# Patient Record
Sex: FEMALE | Race: WHITE | NOT HISPANIC OR LATINO | Employment: OTHER | URBAN - METROPOLITAN AREA
[De-identification: names, ages, dates, MRNs, and addresses within clinical notes are randomized per-mention and may not be internally consistent; named-entity substitution may affect disease eponyms.]

---

## 2019-07-23 ENCOUNTER — HOSPITAL ENCOUNTER (OUTPATIENT)
Dept: RADIOLOGY | Facility: HOSPITAL | Age: 69
Discharge: HOME | End: 2019-07-23
Attending: INTERNAL MEDICINE
Payer: COMMERCIAL

## 2019-07-23 DIAGNOSIS — R05.9 COUGH: ICD-10-CM

## 2019-07-23 DIAGNOSIS — R05.9 COUGH: Primary | ICD-10-CM

## 2019-07-23 PROCEDURE — 71046 XR CHEST PA AND LATERAL: ICD-10-PCS | Mod: 26,,, | Performed by: RADIOLOGY

## 2019-07-23 PROCEDURE — 71046 X-RAY EXAM CHEST 2 VIEWS: CPT | Mod: 26,,, | Performed by: RADIOLOGY

## 2019-07-23 PROCEDURE — 71046 X-RAY EXAM CHEST 2 VIEWS: CPT | Mod: TC,FY

## 2020-07-21 DIAGNOSIS — R06.00 DYSPNEA, UNSPECIFIED TYPE: ICD-10-CM

## 2020-07-21 DIAGNOSIS — R41.3 MEMORY DEFICIT: Primary | ICD-10-CM

## 2020-07-23 ENCOUNTER — OFFICE VISIT (OUTPATIENT)
Dept: UROLOGY | Facility: CLINIC | Age: 70
End: 2020-07-23
Payer: MEDICARE

## 2020-07-23 VITALS
DIASTOLIC BLOOD PRESSURE: 70 MMHG | HEIGHT: 66 IN | BODY MASS INDEX: 22.5 KG/M2 | WEIGHT: 140 LBS | SYSTOLIC BLOOD PRESSURE: 122 MMHG

## 2020-07-23 DIAGNOSIS — R35.0 URINARY FREQUENCY: ICD-10-CM

## 2020-07-23 DIAGNOSIS — N39.46 URINARY INCONTINENCE, MIXED: Primary | ICD-10-CM

## 2020-07-23 DIAGNOSIS — R35.1 NOCTURIA: ICD-10-CM

## 2020-07-23 LAB
BILIRUB SERPL-MCNC: ABNORMAL MG/DL
BLOOD URINE, POC: 50
COLOR, POC UA: YELLOW
GLUCOSE UR QL STRIP: ABNORMAL
KETONES UR QL STRIP: ABNORMAL
LEUKOCYTE ESTERASE URINE, POC: ABNORMAL
NITRITE, POC UA: ABNORMAL
PH, POC UA: 5
PROTEIN, POC: ABNORMAL
SPECIFIC GRAVITY, POC UA: 1025
UROBILINOGEN, POC UA: 1

## 2020-07-23 PROCEDURE — 1126F AMNT PAIN NOTED NONE PRSNT: CPT | Mod: S$GLB,,, | Performed by: NURSE PRACTITIONER

## 2020-07-23 PROCEDURE — 1159F PR MEDICATION LIST DOCUMENTED IN MEDICAL RECORD: ICD-10-PCS | Mod: S$GLB,,, | Performed by: NURSE PRACTITIONER

## 2020-07-23 PROCEDURE — 99204 PR OFFICE/OUTPT VISIT, NEW, LEVL IV, 45-59 MIN: ICD-10-PCS | Mod: 25,S$GLB,, | Performed by: NURSE PRACTITIONER

## 2020-07-23 PROCEDURE — 81001 URINALYSIS AUTO W/SCOPE: CPT | Mod: S$GLB,,, | Performed by: NURSE PRACTITIONER

## 2020-07-23 PROCEDURE — 81001 PR  URINALYSIS, AUTO, W/SCOPE: ICD-10-PCS | Mod: S$GLB,,, | Performed by: NURSE PRACTITIONER

## 2020-07-23 PROCEDURE — 99999 PR PBB SHADOW E&M-EST. PATIENT-LVL III: ICD-10-PCS | Mod: PBBFAC,,, | Performed by: NURSE PRACTITIONER

## 2020-07-23 PROCEDURE — 87086 URINE CULTURE/COLONY COUNT: CPT

## 2020-07-23 PROCEDURE — 99204 OFFICE O/P NEW MOD 45 MIN: CPT | Mod: 25,S$GLB,, | Performed by: NURSE PRACTITIONER

## 2020-07-23 PROCEDURE — 1101F PT FALLS ASSESS-DOCD LE1/YR: CPT | Mod: CPTII,S$GLB,, | Performed by: NURSE PRACTITIONER

## 2020-07-23 PROCEDURE — 1126F PR PAIN SEVERITY QUANTIFIED, NO PAIN PRESENT: ICD-10-PCS | Mod: S$GLB,,, | Performed by: NURSE PRACTITIONER

## 2020-07-23 PROCEDURE — 1159F MED LIST DOCD IN RCRD: CPT | Mod: S$GLB,,, | Performed by: NURSE PRACTITIONER

## 2020-07-23 PROCEDURE — 99999 PR PBB SHADOW E&M-EST. PATIENT-LVL III: CPT | Mod: PBBFAC,,, | Performed by: NURSE PRACTITIONER

## 2020-07-23 PROCEDURE — 3008F PR BODY MASS INDEX (BMI) DOCUMENTED: ICD-10-PCS | Mod: CPTII,S$GLB,, | Performed by: NURSE PRACTITIONER

## 2020-07-23 PROCEDURE — 3008F BODY MASS INDEX DOCD: CPT | Mod: CPTII,S$GLB,, | Performed by: NURSE PRACTITIONER

## 2020-07-23 PROCEDURE — 1101F PR PT FALLS ASSESS DOC 0-1 FALLS W/OUT INJ PAST YR: ICD-10-PCS | Mod: CPTII,S$GLB,, | Performed by: NURSE PRACTITIONER

## 2020-07-23 RX ORDER — SOLIFENACIN SUCCINATE 10 MG/1
10 TABLET, FILM COATED ORAL DAILY
Qty: 30 TABLET | Refills: 11 | Status: CANCELLED | OUTPATIENT
Start: 2020-07-23 | End: 2021-07-23

## 2020-07-23 NOTE — PATIENT INSTRUCTIONS
Treating Incontinence in Women: Nonsurgical Methods    The best treatment for you will depend on the type of incontinence you have. Your symptoms, age, and any underlying problems that are found also affect your treatment. While some types of incontinence may eventually require surgery, nonsurgical treatments may be effective in many cases. Nonsurgical treatments include lifestyle changes, muscle-strengthening exercises, and medicines.  Nonsurgical Treatments  Treatment for stress urinary incontinence includes:  · Bladder training  · Lifestyle changes such as weight loss and increased activity if incontinence is due to being overweight  · Medicines, if bladder training has not helped  · Pelvic floor muscle exercises  Lifestyle changes  · Losing weight. Excess weight puts extra pressure on the pelvic floor muscles. Exercising and eating right can help you lose weight. This helps other treatments work better.  · Making certain diet changes. Some foods may make you need to urinate more, so it may be good to avoid them. These include caffeinated drinks and alcohol. Ask your healthcare provider whether these or other diet changes might be helpful.  · Quitting smoking. Smoking can lead to a chronic cough that strains pelvic floor muscles. Smoking may also damage the bladder and urethra.  Pelvic floor musle exercises  There are exercises you can do to help strengthen your pelvic floor muscles. The pelvic floor muscles act as a sling to help hold the bladder and urethra in place. These muscles also help keep the urethra closed. Weak pelvic floor muscles may allow urine to leak. To strengthen the pelvic floor muscles, do the exercises daily. In a few months, the muscles will be stronger and tighter. This can help prevent urine leakage.  Date Last Reviewed: 1/1/2017  © 6562-7829 [employer]. [address] [place] [address], [place] [address]. All rights reserved. This information is not intended as a substitute for  professional medical care. Always follow your healthcare professional's instructions.        Treating Incontinence in Women with Medicine    Urinary incontinence is the leaking of urine from the bladder. In some cases, medicine can reduce or stop the leaking. It is mainly given for urge incontinence. Your healthcare provider will talk with you about your options. Make sure to ask what side effects to expect.  Below are some types of medicines that may help with urge incontinence.  Types of medicine  · Anticholinergics. These may increase how much urine the bladder can hold. They may also help relax bladder muscles.  · Estrogen. This may help improve muscle tone in the urethra and bladder.  · Antibiotics. These are used to treat urinary tract infections.  · Botulinum toxin. Injection of botulinum toxin into the bladder muscle is an option when other medicines are not effective.  Tips for taking medicine  · Take your medicine on time and as your healthcare provider tell you to.  · Tell your healthcare provider if you have any side effects, your dosage may be adjusted if necessary.  · Be patient. It may take time to find the right dose for you.  · Keep a list of the medicines you take. Show it to your healthcare provider and pharmacist before you buy over-the-counter medicines.   Date Last Reviewed: 1/1/2017  © 1613-5465 [employer]. [address] [place] [address], [place] [address]. All rights reserved. This information is not intended as a substitute for professional medical care. Always follow your healthcare professional's instructions.        Kegel Exercises  Kegel exercises dont need special clothing or equipment. Theyre easy to learn and simple to do. And if you do them right, no one can tell youre doing them, so they can be done almost anywhere. Your healthcare provider, nurse, or physical therapist can answer any questions you have and help you get started.    A weak pelvic floor   The pelvic floor  muscles may weaken due to aging, pregnancy and vaginal childbirth, injury, surgery, chronic cough, or lack of exercise. If the pelvic floor is weak, your bladder and other pelvic organs may sag out of place. The urethra may also open too easily and allow urine to leak out. Kegel exercises can help you strengthen your pelvic floor muscles. Then they can better support the pelvic organs and control urine flow.  How Kegel exercises are done  Try each of the Kegel exercises described below. When youre doing them, try not to move your leg, buttock, or stomach muscles.  · Contract as if you were stopping your urine stream. But do it when youre not urinating.  · Tighten your rectum as if trying not to pass gas. Contract your anus, but dont move your buttocks.  · You may place a finger or 2 in the vagina and squeeze your finger with your vagina to learn which muscles to tighten.  Try to hold each Kegel for a slow count to 5. You probably wont be able to hold them for that long at first. But keep practicing. It will get easier as your pelvic floor gets stronger. Eventually, special weights that you place in your vagina may be recommended to help make your Kegels even more effective. Visit your healthcare provider if you have difficulties doing Kegel exercises.  Helpful hints  Here are some tips to follow:  · Do your Kegels as often as you can. The more you do them, the faster youll feel the results.  · Pick an activity you do often as a reminder. For instance, do your Kegels every time you sit down.  · Tighten your pelvic floor before you sneeze, get up from a chair, cough, laugh, or lift. This protects your pelvic floor from injury and can help prevent urine leakage.   Date Last Reviewed: 8/5/2015  © 9542-7862 [employer]. [address] [place] [address], [place] [address]. All rights reserved. This information is not intended as a substitute for professional medical care. Always follow your healthcare  professional's instructions.

## 2020-07-23 NOTE — LETTER
July 23, 2020      David Olson MD  [address]  [address]  [place] [address]           [facility] - Urology  [address]  [place] [address]  Phone: [phone]  Fax: [phone]          Patient: Elsa Steel   MR Number: [id number]   YOB: 1950   Date of Visit: 7/23/2020       Dear Dr. David Olson:    Thank you for referring Elsa Steel to me for evaluation. Attached you will find relevant portions of my assessment and plan of care.    If you have questions, please do not hesitate to call me. I look forward to following Elsa Steel along with you.    Sincerely,    Tiana Vora, SANDRA    Enclosure  CC:  No Recipients    If you would like to receive this communication electronically, please contact [email or web address] or [phone] to request more information on [employer] Link access.    For providers and/or their staff who would like to refer a patient to [facility], please contact us through our one-stop-shop provider referral line, [facility], at [phone].    If you feel you have received this communication in error or would no longer like to receive these types of communications, please e-mail [email or web address]

## 2020-07-23 NOTE — PROGRESS NOTES
Subjective:       Patient ID: Elsa Steel is a 69 y.o. female who is a new patient was referred by David Olson MD for urinary frequency/urinary incontinence    Chief Complaint:   Chief Complaint   Patient presents with    Other     Pt. states she has been having lower abdominal pain.. leakage.. havong no control over it.. she has tried to hold her urine ad she can't.. burning or heavy feeling when she uses the bathroom.. has a history of UTI's and  she was referred by PCP..        Urinary Incontinence  Patient complains of urinary incontinence. This has been present for 1 year. She reports worsening of symptoms within the past month.  She leaks urine with coughing, laughing, sneezing, with urge, with a full bladder, during the night. Patient describes the symptoms as frequent urination (severalx per day), nocturia 1 times per night, the urge to urinate recurs again shortly following micturition, urge to urinate with little or no warning, urine leakage with coughing/heavy physical activity, urine leaking unpredictably and voiding small amounts. Factors associated with symptoms include none known. Evaluation to date includes none. Treatment to date includes oxybutynin 5 mg BID which was initially but later was no longer effective. She is no longer taking this medication    She is currently wearing 2 pads per day. She reports UTIs x 3 in her lifetime. No previous  surgeries. Denies vaginal bulge. She drinks mostly water. Reports intake of coffee and tea occasionally. Denies issues with constipation.  There is no history of kidney stones. Denies dysuria, gross hematuria or flank pain      ACTIVE MEDICAL ISSUES:  There is no problem list on file for this patient.      PAST MEDICAL HISTORY  No past medical history on file.    PAST SURGICAL HISTORY:  No past surgical history on file.    SOCIAL HISTORY:  Social History     Tobacco Use    Smoking status: Not on file   Substance Use Topics    Alcohol use: Not on  "file    Drug use: Not on file       FAMILY HISTORY:  No family history on file.    ALLERGIES AND MEDICATIONS: updated and reviewed.  Review of patient's allergies indicates:   Allergen Reactions    Iodine and iodide containing products      No current outpatient medications on file.     No current facility-administered medications for this visit.        Review of Systems   Constitutional: Negative for activity change, chills, fatigue, fever and unexpected weight change.   Eyes: Negative for discharge, redness and visual disturbance.   Respiratory: Negative for cough, shortness of breath and wheezing.    Cardiovascular: Negative for chest pain and leg swelling.   Gastrointestinal: Negative for abdominal distention, abdominal pain, constipation, diarrhea, nausea and vomiting.   Genitourinary: Positive for frequency and urgency. Negative for decreased urine volume, difficulty urinating, dysuria, flank pain, hematuria, pelvic pain, vaginal bleeding, vaginal discharge and vaginal pain.   Musculoskeletal: Negative for arthralgias, joint swelling and myalgias.   Skin: Negative for color change and rash.   Neurological: Negative for dizziness and light-headedness.   Psychiatric/Behavioral: Negative for behavioral problems and confusion. The patient is not nervous/anxious.        Objective:      Vitals:    07/23/20 1551   BP: 122/70   Weight: 63.5 kg (140 lb)   Height: 5' 6" (1.676 m)     Physical Exam   Constitutional: She is oriented to person, place, and time. She appears well-developed.   HENT:   Head: Normocephalic and atraumatic.   Nose: Nose normal.   Eyes: Conjunctivae are normal. Right eye exhibits no discharge. Left eye exhibits no discharge.   Neck: Normal range of motion. Neck supple. No tracheal deviation present. No thyromegaly present.   Cardiovascular: Normal rate and regular rhythm.    Pulmonary/Chest: Effort normal. No respiratory distress. She has no wheezes.   Abdominal: Soft. She exhibits no " distension. There is no abdominal tenderness. No hernia.   Genitourinary:    Genitourinary Comments: Patient declined exam     Musculoskeletal: Normal range of motion.   Neurological: She is alert and oriented to person, place, and time.   Skin: Skin is warm and dry. No rash noted. No erythema.     Psychiatric: Her behavior is normal. Judgment normal.       Urine dipstick shows ++LE, 50 RBCs .      Assessment:       1. Urinary incontinence, mixed    2. Urinary frequency    3. Nocturia          Plan:       1. Urinary incontinence, mixed   - Discussed difference of UUI and KIMBERLY components. Reviewed etiology and workup of each.   - KIMBERLY: Kegels, PFPT, pessary, bulking agent, MUS.   -Kegel exercises. Handout provided   - UUI: Behavioral changes, PFPT, anticholinergics, mirabegron. Botox/InterStim for refractory UUI.   -Oxybutynin 5 mg BID--no longer helpful  -Discussed trial of another ACh vs Myrbetriq--she is not inclined to taking any additional medications at this time  -Discussed timed voiding/bladder retraining. Handout provided  - POCT urinalysis, dipstick or tablet reag    2. Urinary frequency  -Unable to perform PVR d/t bladder scan being unavailable  -PVR next visit  - Urine culture    3. Nocturia    - Urine culture            Follow up in about 3 months (around 10/23/2020) for Follow up.

## 2020-07-24 ENCOUNTER — HOSPITAL ENCOUNTER (OUTPATIENT)
Dept: RADIOLOGY | Facility: HOSPITAL | Age: 70
Discharge: HOME | End: 2020-07-24
Attending: INTERNAL MEDICINE
Payer: MEDICARE

## 2020-07-24 DIAGNOSIS — R41.3 MEMORY DEFICIT: ICD-10-CM

## 2020-07-24 DIAGNOSIS — R06.00 DYSPNEA, UNSPECIFIED TYPE: ICD-10-CM

## 2020-07-24 PROCEDURE — 71046 XR CHEST PA AND LATERAL: ICD-10-PCS | Mod: 26,,, | Performed by: RADIOLOGY

## 2020-07-24 PROCEDURE — 71046 X-RAY EXAM CHEST 2 VIEWS: CPT | Mod: 26,,, | Performed by: RADIOLOGY

## 2020-07-24 PROCEDURE — 71046 X-RAY EXAM CHEST 2 VIEWS: CPT | Mod: TC,FY

## 2020-07-25 LAB — BACTERIA UR CULT: NO GROWTH

## 2020-07-27 ENCOUNTER — TELEPHONE (OUTPATIENT)
Dept: UROLOGY | Facility: CLINIC | Age: 70
End: 2020-07-27

## 2021-03-10 ENCOUNTER — OFFICE VISIT (OUTPATIENT)
Dept: PULMONOLOGY | Facility: CLINIC | Age: 71
End: 2021-03-10
Payer: MEDICARE

## 2021-03-10 VITALS
BODY MASS INDEX: 24.82 KG/M2 | TEMPERATURE: 98 F | HEART RATE: 68 BPM | HEIGHT: 66 IN | OXYGEN SATURATION: 97 % | SYSTOLIC BLOOD PRESSURE: 141 MMHG | DIASTOLIC BLOOD PRESSURE: 84 MMHG | WEIGHT: 154.44 LBS

## 2021-03-10 DIAGNOSIS — R06.09 DYSPNEA ON EXERTION: Primary | ICD-10-CM

## 2021-03-10 DIAGNOSIS — K22.2 ESOPHAGEAL OBSTRUCTION: ICD-10-CM

## 2021-03-10 DIAGNOSIS — R13.10 DYSPHAGIA, UNSPECIFIED TYPE: ICD-10-CM

## 2021-03-10 PROCEDURE — 3008F PR BODY MASS INDEX (BMI) DOCUMENTED: ICD-10-PCS | Mod: CPTII,S$GLB,, | Performed by: INTERNAL MEDICINE

## 2021-03-10 PROCEDURE — 1101F PT FALLS ASSESS-DOCD LE1/YR: CPT | Mod: CPTII,S$GLB,, | Performed by: INTERNAL MEDICINE

## 2021-03-10 PROCEDURE — 99204 OFFICE O/P NEW MOD 45 MIN: CPT | Mod: S$GLB,,, | Performed by: INTERNAL MEDICINE

## 2021-03-10 PROCEDURE — 1125F PR PAIN SEVERITY QUANTIFIED, PAIN PRESENT: ICD-10-PCS | Mod: S$GLB,,, | Performed by: INTERNAL MEDICINE

## 2021-03-10 PROCEDURE — 3288F FALL RISK ASSESSMENT DOCD: CPT | Mod: CPTII,S$GLB,, | Performed by: INTERNAL MEDICINE

## 2021-03-10 PROCEDURE — 1125F AMNT PAIN NOTED PAIN PRSNT: CPT | Mod: S$GLB,,, | Performed by: INTERNAL MEDICINE

## 2021-03-10 PROCEDURE — 3008F BODY MASS INDEX DOCD: CPT | Mod: CPTII,S$GLB,, | Performed by: INTERNAL MEDICINE

## 2021-03-10 PROCEDURE — 1159F PR MEDICATION LIST DOCUMENTED IN MEDICAL RECORD: ICD-10-PCS | Mod: S$GLB,,, | Performed by: INTERNAL MEDICINE

## 2021-03-10 PROCEDURE — 99204 PR OFFICE/OUTPT VISIT, NEW, LEVL IV, 45-59 MIN: ICD-10-PCS | Mod: S$GLB,,, | Performed by: INTERNAL MEDICINE

## 2021-03-10 PROCEDURE — 1101F PR PT FALLS ASSESS DOC 0-1 FALLS W/OUT INJ PAST YR: ICD-10-PCS | Mod: CPTII,S$GLB,, | Performed by: INTERNAL MEDICINE

## 2021-03-10 PROCEDURE — 1159F MED LIST DOCD IN RCRD: CPT | Mod: S$GLB,,, | Performed by: INTERNAL MEDICINE

## 2021-03-10 PROCEDURE — 99999 PR PBB SHADOW E&M-EST. PATIENT-LVL IV: ICD-10-PCS | Mod: PBBFAC,,, | Performed by: INTERNAL MEDICINE

## 2021-03-10 PROCEDURE — 3288F PR FALLS RISK ASSESSMENT DOCUMENTED: ICD-10-PCS | Mod: CPTII,S$GLB,, | Performed by: INTERNAL MEDICINE

## 2021-03-10 PROCEDURE — 99999 PR PBB SHADOW E&M-EST. PATIENT-LVL IV: CPT | Mod: PBBFAC,,, | Performed by: INTERNAL MEDICINE

## 2021-03-10 RX ORDER — SERTRALINE HYDROCHLORIDE 100 MG/1
100 TABLET, FILM COATED ORAL DAILY
COMMUNITY

## 2021-03-10 RX ORDER — GABAPENTIN 300 MG/1
300 CAPSULE ORAL 3 TIMES DAILY
COMMUNITY
End: 2023-04-13

## 2021-03-10 RX ORDER — PROPRANOLOL HYDROCHLORIDE 80 MG/1
80 CAPSULE, EXTENDED RELEASE ORAL DAILY
COMMUNITY
End: 2021-10-22

## 2021-03-10 RX ORDER — AMLODIPINE BESYLATE 5 MG/1
5 TABLET ORAL DAILY
COMMUNITY

## 2021-03-10 RX ORDER — ROSUVASTATIN CALCIUM 20 MG/1
20 TABLET, COATED ORAL DAILY
COMMUNITY
End: 2021-11-11

## 2021-03-11 ENCOUNTER — HOSPITAL ENCOUNTER (OUTPATIENT)
Dept: RADIOLOGY | Facility: HOSPITAL | Age: 71
Discharge: HOME | End: 2021-03-11
Attending: INTERNAL MEDICINE
Payer: MEDICARE

## 2021-03-11 DIAGNOSIS — K22.2 ESOPHAGEAL OBSTRUCTION: ICD-10-CM

## 2021-03-11 DIAGNOSIS — R13.10 DYSPHAGIA, UNSPECIFIED TYPE: ICD-10-CM

## 2021-03-11 PROCEDURE — 74230 X-RAY XM SWLNG FUNCJ C+: CPT | Mod: TC

## 2021-03-11 PROCEDURE — 25500020 PHARM REV CODE 255: Performed by: INTERNAL MEDICINE

## 2021-03-11 PROCEDURE — 74230 FL MODIFIED BARIUM SWALLOW SPEECH STUDY: ICD-10-PCS | Mod: 26,,, | Performed by: RADIOLOGY

## 2021-03-11 PROCEDURE — 92611 MOTION FLUOROSCOPY/SWALLOW: CPT

## 2021-03-11 PROCEDURE — 74230 X-RAY XM SWLNG FUNCJ C+: CPT | Mod: 26,,, | Performed by: RADIOLOGY

## 2021-03-11 PROCEDURE — A9698 NON-RAD CONTRAST MATERIALNOC: HCPCS | Performed by: INTERNAL MEDICINE

## 2021-03-11 PROCEDURE — 97535 SELF CARE MNGMENT TRAINING: CPT

## 2021-03-11 RX ADMIN — BARIUM SULFATE 60 ML: 0.81 POWDER, FOR SUSPENSION ORAL at 11:03

## 2021-03-12 ENCOUNTER — LAB VISIT (OUTPATIENT)
Dept: FAMILY MEDICINE | Facility: CLINIC | Age: 71
End: 2021-03-12
Payer: MEDICARE

## 2021-03-12 ENCOUNTER — TELEPHONE (OUTPATIENT)
Dept: PULMONOLOGY | Facility: CLINIC | Age: 71
End: 2021-03-12

## 2021-03-12 DIAGNOSIS — R13.12 OROPHARYNGEAL DYSPHAGIA: Primary | ICD-10-CM

## 2021-03-12 DIAGNOSIS — R06.09 DYSPNEA ON EXERTION: ICD-10-CM

## 2021-03-12 PROCEDURE — U0003 INFECTIOUS AGENT DETECTION BY NUCLEIC ACID (DNA OR RNA); SEVERE ACUTE RESPIRATORY SYNDROME CORONAVIRUS 2 (SARS-COV-2) (CORONAVIRUS DISEASE [COVID-19]), AMPLIFIED PROBE TECHNIQUE, MAKING USE OF HIGH THROUGHPUT TECHNOLOGIES AS DESCRIBED BY CMS-2020-01-R: HCPCS | Performed by: INTERNAL MEDICINE

## 2021-03-12 PROCEDURE — U0005 INFEC AGEN DETEC AMPLI PROBE: HCPCS | Performed by: INTERNAL MEDICINE

## 2021-03-13 LAB — SARS-COV-2 RNA RESP QL NAA+PROBE: NOT DETECTED

## 2021-03-16 ENCOUNTER — HOSPITAL ENCOUNTER (OUTPATIENT)
Dept: RESPIRATORY THERAPY | Facility: HOSPITAL | Age: 71
Discharge: HOME | End: 2021-03-16
Attending: INTERNAL MEDICINE
Payer: MEDICARE

## 2021-03-16 VITALS — RESPIRATION RATE: 18 BRPM | OXYGEN SATURATION: 99 % | HEART RATE: 68 BPM

## 2021-03-16 DIAGNOSIS — R06.09 DYSPNEA ON EXERTION: ICD-10-CM

## 2021-03-16 PROCEDURE — 94727 GAS DIL/WSHOT DETER LNG VOL: CPT

## 2021-03-16 PROCEDURE — 94060 EVALUATION OF WHEEZING: CPT | Mod: 26,,, | Performed by: INTERNAL MEDICINE

## 2021-03-16 PROCEDURE — 94727 GAS DIL/WSHOT DETER LNG VOL: CPT | Mod: 26,,, | Performed by: INTERNAL MEDICINE

## 2021-03-16 PROCEDURE — 94729 DIFFUSING CAPACITY: CPT

## 2021-03-16 PROCEDURE — 94727 PR PULM FUNCTION TEST BY GAS: ICD-10-PCS | Mod: 26,,, | Performed by: INTERNAL MEDICINE

## 2021-03-16 PROCEDURE — 94060 PR EVAL OF BRONCHOSPASM: ICD-10-PCS | Mod: 26,,, | Performed by: INTERNAL MEDICINE

## 2021-03-16 PROCEDURE — 25000242 PHARM REV CODE 250 ALT 637 W/ HCPCS: Performed by: INTERNAL MEDICINE

## 2021-03-16 PROCEDURE — 94010 BREATHING CAPACITY TEST: CPT

## 2021-03-16 PROCEDURE — 94729 DIFFUSING CAPACITY: CPT | Mod: 26,,, | Performed by: INTERNAL MEDICINE

## 2021-03-16 PROCEDURE — 94729 PR C02/MEMBANE DIFFUSE CAPACITY: ICD-10-PCS | Mod: 26,,, | Performed by: INTERNAL MEDICINE

## 2021-03-16 RX ORDER — ALBUTEROL SULFATE 2.5 MG/.5ML
2.5 SOLUTION RESPIRATORY (INHALATION) ONCE
Status: COMPLETED | OUTPATIENT
Start: 2021-03-16 | End: 2021-03-16

## 2021-03-16 RX ADMIN — ALBUTEROL SULFATE 2.5 MG: 2.5 SOLUTION RESPIRATORY (INHALATION) at 10:03

## 2021-04-09 ENCOUNTER — TELEPHONE (OUTPATIENT)
Dept: PULMONOLOGY | Facility: CLINIC | Age: 71
End: 2021-04-09

## 2021-04-13 ENCOUNTER — TELEPHONE (OUTPATIENT)
Dept: PULMONOLOGY | Facility: CLINIC | Age: 71
End: 2021-04-13

## 2021-04-22 ENCOUNTER — HOSPITAL ENCOUNTER (OUTPATIENT)
Dept: CARDIOLOGY | Facility: HOSPITAL | Age: 71
Discharge: HOME | End: 2021-04-22
Attending: INTERNAL MEDICINE
Payer: MEDICARE

## 2021-04-22 VITALS
HEIGHT: 66 IN | BODY MASS INDEX: 24.84 KG/M2 | WEIGHT: 154.56 LBS | SYSTOLIC BLOOD PRESSURE: 131 MMHG | DIASTOLIC BLOOD PRESSURE: 73 MMHG

## 2021-04-22 DIAGNOSIS — R06.09 DYSPNEA ON EXERTION: ICD-10-CM

## 2021-04-22 LAB
AORTIC ROOT ANNULUS: 2.98 CM
AORTIC VALVE CUSP SEPERATION: 2 CM
ASCENDING AORTA: 2.88 CM
AV INDEX (PROSTH): 0.71
AV MEAN GRADIENT: 4 MMHG
AV PEAK GRADIENT: 5 MMHG
AV VALVE AREA: 2.07 CM2
AV VELOCITY RATIO: 0.86
BSA FOR ECHO PROCEDURE: 1.81 M2
CV ECHO LV RWT: 0.49 CM
CV STRESS BASE HR: 53 BPM
DIASTOLIC BLOOD PRESSURE: 73 MMHG
DOP CALC AO PEAK VEL: 1.17 M/S
DOP CALC AO VTI: 32.06 CM
DOP CALC LVOT AREA: 2.9 CM2
DOP CALC LVOT DIAMETER: 1.93 CM
DOP CALC LVOT PEAK VEL: 1.01 M/S
DOP CALC LVOT STROKE VOLUME: 66.32 CM3
DOP CALCLVOT PEAK VEL VTI: 22.68 CM
E WAVE DECELERATION TIME: 199.21 MSEC
E/A RATIO: 1.31
E/E' RATIO: 12.27 M/S
ECHO LV POSTERIOR WALL: 0.9 CM (ref 0.6–1.1)
EJECTION FRACTION: 55 %
FRACTIONAL SHORTENING: 29 % (ref 28–44)
INTERVENTRICULAR SEPTUM: 0.92 CM (ref 0.6–1.1)
IVRT: 69.2 MSEC
LA MAJOR: 4.37 CM
LA MINOR: 4.41 CM
LA WIDTH: 3.68 CM
LEFT ATRIUM SIZE: 3.04 CM
LEFT ATRIUM VOLUME INDEX: 23.3 ML/M2
LEFT ATRIUM VOLUME: 41.74 CM3
LEFT INTERNAL DIMENSION IN SYSTOLE: 2.62 CM (ref 2.1–4)
LEFT VENTRICLE DIASTOLIC VOLUME INDEX: 31.91 ML/M2
LEFT VENTRICLE DIASTOLIC VOLUME: 57.12 ML
LEFT VENTRICLE MASS INDEX: 54 G/M2
LEFT VENTRICLE SYSTOLIC VOLUME INDEX: 14.1 ML/M2
LEFT VENTRICLE SYSTOLIC VOLUME: 25.19 ML
LEFT VENTRICULAR INTERNAL DIMENSION IN DIASTOLE: 3.67 CM (ref 3.5–6)
LEFT VENTRICULAR MASS: 97.14 G
LV LATERAL E/E' RATIO: 11.5 M/S
LV SEPTAL E/E' RATIO: 13.14 M/S
MV PEAK A VEL: 0.7 M/S
MV PEAK E VEL: 0.92 M/S
OHS CV CPX 1 MINUTE RECOVERY HEART RATE: 112 BPM
OHS CV CPX 85 PERCENT MAX PREDICTED HEART RATE MALE: 121
OHS CV CPX MAX PREDICTED HEART RATE: 143
OHS CV CPX PATIENT IS FEMALE: 1
OHS CV CPX PATIENT IS MALE: 0
OHS CV CPX PEAK DIASTOLIC BLOOD PRESSURE: 98 MMHG
OHS CV CPX PEAK HEAR RATE: 117 BPM
OHS CV CPX PEAK RATE PRESSURE PRODUCT: NORMAL
OHS CV CPX PEAK SYSTOLIC BLOOD PRESSURE: 210 MMHG
OHS CV CPX PERCENT MAX PREDICTED HEART RATE ACHIEVED: 82
OHS CV CPX RATE PRESSURE PRODUCT PRESENTING: 6943
PISA TR MAX VEL: 2.74 M/S
PULM VEIN S/D RATIO: 0.73
PV PEAK D VEL: 0.55 M/S
PV PEAK S VEL: 0.4 M/S
PV PEAK VELOCITY: 0.71 CM/S
RA MAJOR: 3.95 CM
RA PRESSURE: 8 MMHG
RA WIDTH: 2.56 CM
RIGHT VENTRICULAR END-DIASTOLIC DIMENSION: 2.94 CM
RV TISSUE DOPPLER FREE WALL SYSTOLIC VELOCITY 1 (APICAL 4 CHAMBER VIEW): 7.14 CM/S
SINUS: 3.19 CM
STJ: 2.36 CM
STRESS ECHO POST EXERCISE DUR MIN: 13 MINUTES
STRESS ECHO POST EXERCISE DUR SEC: 24 SECONDS
SYSTOLIC BLOOD PRESSURE: 131 MMHG
TDI LATERAL: 0.08 M/S
TDI SEPTAL: 0.07 M/S
TDI: 0.08 M/S
TR MAX PG: 30 MMHG
TRICUSPID ANNULAR PLANE SYSTOLIC EXCURSION: 2.54 CM
TV REST PULMONARY ARTERY PRESSURE: 38 MMHG

## 2021-04-22 PROCEDURE — 93320 DOPPLER ECHO COMPLETE: CPT

## 2021-04-22 PROCEDURE — 93325 DOPPLER ECHO COLOR FLOW MAPG: CPT | Mod: 26,,, | Performed by: INTERNAL MEDICINE

## 2021-04-22 PROCEDURE — 93351 STRESS ECHO (CUPID ONLY): ICD-10-PCS | Mod: 26,,, | Performed by: INTERNAL MEDICINE

## 2021-04-22 PROCEDURE — 93320 DOPPLER ECHO COMPLETE: CPT | Mod: 26,,, | Performed by: INTERNAL MEDICINE

## 2021-04-22 PROCEDURE — 63600175 PHARM REV CODE 636 W HCPCS: Performed by: INTERNAL MEDICINE

## 2021-04-22 PROCEDURE — 93320 STRESS ECHO (CUPID ONLY): ICD-10-PCS | Mod: 26,,, | Performed by: INTERNAL MEDICINE

## 2021-04-22 PROCEDURE — 93351 STRESS TTE COMPLETE: CPT | Mod: 26,,, | Performed by: INTERNAL MEDICINE

## 2021-04-22 PROCEDURE — 93325 STRESS ECHO (CUPID ONLY): ICD-10-PCS | Mod: 26,,, | Performed by: INTERNAL MEDICINE

## 2021-04-22 RX ORDER — ATROPINE SULFATE 0.1 MG/ML
1 INJECTION INTRAVENOUS ONCE
Status: COMPLETED | OUTPATIENT
Start: 2021-04-22 | End: 2021-04-22

## 2021-04-22 RX ORDER — DOBUTAMINE HYDROCHLORIDE 400 MG/100ML
20 INJECTION INTRAVENOUS ONCE
Status: DISCONTINUED | OUTPATIENT
Start: 2021-04-22 | End: 2021-04-22

## 2021-04-22 RX ADMIN — DOBUTAMINE HYDROCHLORIDE 20 MCG/KG/MIN: 400 INJECTION INTRAVENOUS at 11:04

## 2021-04-22 RX ADMIN — ATROPINE SULFATE 1 MG: 0.1 INJECTION, SOLUTION INTRAVENOUS at 11:04

## 2021-05-11 ENCOUNTER — OFFICE VISIT (OUTPATIENT)
Dept: GASTROENTEROLOGY | Facility: CLINIC | Age: 71
End: 2021-05-11
Payer: MEDICARE

## 2021-05-11 VITALS
BODY MASS INDEX: 24.5 KG/M2 | DIASTOLIC BLOOD PRESSURE: 61 MMHG | HEIGHT: 66 IN | SYSTOLIC BLOOD PRESSURE: 122 MMHG | HEART RATE: 61 BPM | WEIGHT: 152.44 LBS

## 2021-05-11 DIAGNOSIS — R13.12 OROPHARYNGEAL DYSPHAGIA: Primary | ICD-10-CM

## 2021-05-11 PROCEDURE — 1159F MED LIST DOCD IN RCRD: CPT | Mod: S$GLB,,, | Performed by: STUDENT IN AN ORGANIZED HEALTH CARE EDUCATION/TRAINING PROGRAM

## 2021-05-11 PROCEDURE — 99999 PR PBB SHADOW E&M-EST. PATIENT-LVL IV: ICD-10-PCS | Mod: PBBFAC,,, | Performed by: STUDENT IN AN ORGANIZED HEALTH CARE EDUCATION/TRAINING PROGRAM

## 2021-05-11 PROCEDURE — 99204 OFFICE O/P NEW MOD 45 MIN: CPT | Mod: S$GLB,,, | Performed by: STUDENT IN AN ORGANIZED HEALTH CARE EDUCATION/TRAINING PROGRAM

## 2021-05-11 PROCEDURE — 1159F PR MEDICATION LIST DOCUMENTED IN MEDICAL RECORD: ICD-10-PCS | Mod: S$GLB,,, | Performed by: STUDENT IN AN ORGANIZED HEALTH CARE EDUCATION/TRAINING PROGRAM

## 2021-05-11 PROCEDURE — 3008F PR BODY MASS INDEX (BMI) DOCUMENTED: ICD-10-PCS | Mod: CPTII,S$GLB,, | Performed by: STUDENT IN AN ORGANIZED HEALTH CARE EDUCATION/TRAINING PROGRAM

## 2021-05-11 PROCEDURE — 3008F BODY MASS INDEX DOCD: CPT | Mod: CPTII,S$GLB,, | Performed by: STUDENT IN AN ORGANIZED HEALTH CARE EDUCATION/TRAINING PROGRAM

## 2021-05-11 PROCEDURE — 99999 PR PBB SHADOW E&M-EST. PATIENT-LVL IV: CPT | Mod: PBBFAC,,, | Performed by: STUDENT IN AN ORGANIZED HEALTH CARE EDUCATION/TRAINING PROGRAM

## 2021-05-11 PROCEDURE — 99204 PR OFFICE/OUTPT VISIT, NEW, LEVL IV, 45-59 MIN: ICD-10-PCS | Mod: S$GLB,,, | Performed by: STUDENT IN AN ORGANIZED HEALTH CARE EDUCATION/TRAINING PROGRAM

## 2021-05-11 PROCEDURE — 1126F PR PAIN SEVERITY QUANTIFIED, NO PAIN PRESENT: ICD-10-PCS | Mod: S$GLB,,, | Performed by: STUDENT IN AN ORGANIZED HEALTH CARE EDUCATION/TRAINING PROGRAM

## 2021-05-11 PROCEDURE — 1126F AMNT PAIN NOTED NONE PRSNT: CPT | Mod: S$GLB,,, | Performed by: STUDENT IN AN ORGANIZED HEALTH CARE EDUCATION/TRAINING PROGRAM

## 2021-05-12 ENCOUNTER — PATIENT MESSAGE (OUTPATIENT)
Dept: ENDOSCOPY | Facility: HOSPITAL | Age: 71
End: 2021-05-12

## 2021-05-12 DIAGNOSIS — Z01.818 PRE-OP TESTING: ICD-10-CM

## 2021-05-18 ENCOUNTER — LAB VISIT (OUTPATIENT)
Dept: FAMILY MEDICINE | Facility: CLINIC | Age: 71
End: 2021-05-18
Payer: MEDICARE

## 2021-05-18 DIAGNOSIS — Z01.818 PRE-OP TESTING: ICD-10-CM

## 2021-05-18 PROCEDURE — U0005 INFEC AGEN DETEC AMPLI PROBE: HCPCS | Performed by: STUDENT IN AN ORGANIZED HEALTH CARE EDUCATION/TRAINING PROGRAM

## 2021-05-18 PROCEDURE — U0003 INFECTIOUS AGENT DETECTION BY NUCLEIC ACID (DNA OR RNA); SEVERE ACUTE RESPIRATORY SYNDROME CORONAVIRUS 2 (SARS-COV-2) (CORONAVIRUS DISEASE [COVID-19]), AMPLIFIED PROBE TECHNIQUE, MAKING USE OF HIGH THROUGHPUT TECHNOLOGIES AS DESCRIBED BY CMS-2020-01-R: HCPCS | Performed by: STUDENT IN AN ORGANIZED HEALTH CARE EDUCATION/TRAINING PROGRAM

## 2021-05-19 LAB — SARS-COV-2 RNA RESP QL NAA+PROBE: NOT DETECTED

## 2021-05-21 ENCOUNTER — ANESTHESIA EVENT (OUTPATIENT)
Dept: ENDOSCOPY | Facility: HOSPITAL | Age: 71
End: 2021-05-21
Payer: MEDICARE

## 2021-05-21 ENCOUNTER — ANESTHESIA (OUTPATIENT)
Dept: ENDOSCOPY | Facility: HOSPITAL | Age: 71
End: 2021-05-21
Payer: MEDICARE

## 2021-05-21 ENCOUNTER — HOSPITAL ENCOUNTER (OUTPATIENT)
Facility: HOSPITAL | Age: 71
Discharge: HOME | End: 2021-05-21
Attending: STUDENT IN AN ORGANIZED HEALTH CARE EDUCATION/TRAINING PROGRAM | Admitting: STUDENT IN AN ORGANIZED HEALTH CARE EDUCATION/TRAINING PROGRAM
Payer: MEDICARE

## 2021-05-21 VITALS
TEMPERATURE: 98 F | SYSTOLIC BLOOD PRESSURE: 140 MMHG | OXYGEN SATURATION: 98 % | DIASTOLIC BLOOD PRESSURE: 68 MMHG | HEART RATE: 68 BPM | RESPIRATION RATE: 18 BRPM

## 2021-05-21 DIAGNOSIS — R13.10 DYSPHAGIA: ICD-10-CM

## 2021-05-21 DIAGNOSIS — R13.10 DYSPHAGIA, UNSPECIFIED TYPE: Primary | ICD-10-CM

## 2021-05-21 PROCEDURE — D9220A PRA ANESTHESIA: ICD-10-PCS | Mod: ANES,,, | Performed by: ANESTHESIOLOGY

## 2021-05-21 PROCEDURE — D9220A PRA ANESTHESIA: Mod: CRNA,,, | Performed by: STUDENT IN AN ORGANIZED HEALTH CARE EDUCATION/TRAINING PROGRAM

## 2021-05-21 PROCEDURE — 43239 EGD BIOPSY SINGLE/MULTIPLE: CPT | Performed by: STUDENT IN AN ORGANIZED HEALTH CARE EDUCATION/TRAINING PROGRAM

## 2021-05-21 PROCEDURE — 88305 TISSUE EXAM BY PATHOLOGIST: CPT | Performed by: PATHOLOGY

## 2021-05-21 PROCEDURE — 43239 PR EGD, FLEX, W/BIOPSY, SGL/MULTI: ICD-10-PCS | Mod: ,,, | Performed by: STUDENT IN AN ORGANIZED HEALTH CARE EDUCATION/TRAINING PROGRAM

## 2021-05-21 PROCEDURE — 25000003 PHARM REV CODE 250: Performed by: STUDENT IN AN ORGANIZED HEALTH CARE EDUCATION/TRAINING PROGRAM

## 2021-05-21 PROCEDURE — 27201012 HC FORCEPS, HOT/COLD, DISP: Performed by: STUDENT IN AN ORGANIZED HEALTH CARE EDUCATION/TRAINING PROGRAM

## 2021-05-21 PROCEDURE — 88305 TISSUE EXAM BY PATHOLOGIST: ICD-10-PCS | Mod: 26,,, | Performed by: PATHOLOGY

## 2021-05-21 PROCEDURE — D9220A PRA ANESTHESIA: Mod: ANES,,, | Performed by: ANESTHESIOLOGY

## 2021-05-21 PROCEDURE — D9220A PRA ANESTHESIA: ICD-10-PCS | Mod: CRNA,,, | Performed by: STUDENT IN AN ORGANIZED HEALTH CARE EDUCATION/TRAINING PROGRAM

## 2021-05-21 PROCEDURE — 37000008 HC ANESTHESIA 1ST 15 MINUTES: Performed by: STUDENT IN AN ORGANIZED HEALTH CARE EDUCATION/TRAINING PROGRAM

## 2021-05-21 PROCEDURE — 43239 EGD BIOPSY SINGLE/MULTIPLE: CPT | Mod: ,,, | Performed by: STUDENT IN AN ORGANIZED HEALTH CARE EDUCATION/TRAINING PROGRAM

## 2021-05-21 PROCEDURE — 88305 TISSUE EXAM BY PATHOLOGIST: CPT | Mod: 26,,, | Performed by: PATHOLOGY

## 2021-05-21 PROCEDURE — 63600175 PHARM REV CODE 636 W HCPCS: Performed by: STUDENT IN AN ORGANIZED HEALTH CARE EDUCATION/TRAINING PROGRAM

## 2021-05-21 RX ORDER — PROPOFOL 10 MG/ML
VIAL (ML) INTRAVENOUS
Status: DISCONTINUED | OUTPATIENT
Start: 2021-05-21 | End: 2021-05-21

## 2021-05-21 RX ORDER — LIDOCAINE HYDROCHLORIDE 20 MG/ML
INJECTION INTRAVENOUS
Status: DISCONTINUED | OUTPATIENT
Start: 2021-05-21 | End: 2021-05-21

## 2021-05-21 RX ORDER — PROPOFOL 10 MG/ML
INJECTION, EMULSION INTRAVENOUS
Status: DISCONTINUED
Start: 2021-05-21 | End: 2021-05-21 | Stop reason: HOSPADM

## 2021-05-21 RX ORDER — SODIUM CHLORIDE 9 MG/ML
INJECTION, SOLUTION INTRAVENOUS CONTINUOUS PRN
Status: DISCONTINUED | OUTPATIENT
Start: 2021-05-21 | End: 2021-05-21

## 2021-05-21 RX ORDER — LIDOCAINE HYDROCHLORIDE 20 MG/ML
INJECTION, SOLUTION EPIDURAL; INFILTRATION; INTRACAUDAL; PERINEURAL
Status: DISCONTINUED
Start: 2021-05-21 | End: 2021-05-21 | Stop reason: HOSPADM

## 2021-05-21 RX ORDER — SODIUM CHLORIDE 9 MG/ML
INJECTION, SOLUTION INTRAVENOUS CONTINUOUS
Status: DISCONTINUED | OUTPATIENT
Start: 2021-05-21 | End: 2021-05-21 | Stop reason: HOSPADM

## 2021-05-21 RX ADMIN — Medication 100 MG: at 03:05

## 2021-05-21 RX ADMIN — SODIUM CHLORIDE: 0.9 INJECTION, SOLUTION INTRAVENOUS at 03:05

## 2021-05-21 RX ADMIN — SODIUM CHLORIDE: 9 INJECTION, SOLUTION INTRAVENOUS at 03:05

## 2021-05-21 RX ADMIN — PROPOFOL 100 MG: 10 INJECTION, EMULSION INTRAVENOUS at 03:05

## 2021-05-25 LAB
FINAL PATHOLOGIC DIAGNOSIS: NORMAL
GROSS: NORMAL
Lab: NORMAL

## 2021-06-21 ENCOUNTER — LAB VISIT (OUTPATIENT)
Dept: LAB | Facility: HOSPITAL | Age: 71
End: 2021-06-21
Attending: NEUROLOGICAL SURGERY
Payer: MEDICARE

## 2021-06-21 ENCOUNTER — OFFICE VISIT (OUTPATIENT)
Dept: NEUROLOGY | Facility: CLINIC | Age: 71
End: 2021-06-21
Payer: MEDICARE

## 2021-06-21 VITALS
BODY MASS INDEX: 25.01 KG/M2 | HEIGHT: 66 IN | WEIGHT: 155.63 LBS | SYSTOLIC BLOOD PRESSURE: 129 MMHG | HEART RATE: 61 BPM | DIASTOLIC BLOOD PRESSURE: 65 MMHG

## 2021-06-21 DIAGNOSIS — R47.89: ICD-10-CM

## 2021-06-21 DIAGNOSIS — G25.0 ESSENTIAL TREMOR: ICD-10-CM

## 2021-06-21 DIAGNOSIS — G25.0 ESSENTIAL TREMOR: Primary | ICD-10-CM

## 2021-06-21 DIAGNOSIS — R06.09 DYSPNEA ON EXERTION: ICD-10-CM

## 2021-06-21 LAB — TSH SERPL DL<=0.005 MIU/L-ACNC: 3.01 UIU/ML (ref 0.4–4)

## 2021-06-21 PROCEDURE — 1101F PR PT FALLS ASSESS DOC 0-1 FALLS W/OUT INJ PAST YR: ICD-10-PCS | Mod: CPTII,S$GLB,, | Performed by: NEUROLOGICAL SURGERY

## 2021-06-21 PROCEDURE — 99999 PR PBB SHADOW E&M-EST. PATIENT-LVL IV: CPT | Mod: PBBFAC,,, | Performed by: NEUROLOGICAL SURGERY

## 2021-06-21 PROCEDURE — 3288F PR FALLS RISK ASSESSMENT DOCUMENTED: ICD-10-PCS | Mod: CPTII,S$GLB,, | Performed by: NEUROLOGICAL SURGERY

## 2021-06-21 PROCEDURE — 84443 ASSAY THYROID STIM HORMONE: CPT | Performed by: NEUROLOGICAL SURGERY

## 2021-06-21 PROCEDURE — 3288F FALL RISK ASSESSMENT DOCD: CPT | Mod: CPTII,S$GLB,, | Performed by: NEUROLOGICAL SURGERY

## 2021-06-21 PROCEDURE — 1126F AMNT PAIN NOTED NONE PRSNT: CPT | Mod: S$GLB,,, | Performed by: NEUROLOGICAL SURGERY

## 2021-06-21 PROCEDURE — 99999 PR PBB SHADOW E&M-EST. PATIENT-LVL IV: ICD-10-PCS | Mod: PBBFAC,,, | Performed by: NEUROLOGICAL SURGERY

## 2021-06-21 PROCEDURE — 1159F PR MEDICATION LIST DOCUMENTED IN MEDICAL RECORD: ICD-10-PCS | Mod: S$GLB,,, | Performed by: NEUROLOGICAL SURGERY

## 2021-06-21 PROCEDURE — 1126F PR PAIN SEVERITY QUANTIFIED, NO PAIN PRESENT: ICD-10-PCS | Mod: S$GLB,,, | Performed by: NEUROLOGICAL SURGERY

## 2021-06-21 PROCEDURE — 82525 ASSAY OF COPPER: CPT | Performed by: NEUROLOGICAL SURGERY

## 2021-06-21 PROCEDURE — 99204 OFFICE O/P NEW MOD 45 MIN: CPT | Mod: S$GLB,,, | Performed by: NEUROLOGICAL SURGERY

## 2021-06-21 PROCEDURE — 3008F BODY MASS INDEX DOCD: CPT | Mod: CPTII,S$GLB,, | Performed by: NEUROLOGICAL SURGERY

## 2021-06-21 PROCEDURE — 1159F MED LIST DOCD IN RCRD: CPT | Mod: S$GLB,,, | Performed by: NEUROLOGICAL SURGERY

## 2021-06-21 PROCEDURE — 1101F PT FALLS ASSESS-DOCD LE1/YR: CPT | Mod: CPTII,S$GLB,, | Performed by: NEUROLOGICAL SURGERY

## 2021-06-21 PROCEDURE — 99204 PR OFFICE/OUTPT VISIT, NEW, LEVL IV, 45-59 MIN: ICD-10-PCS | Mod: S$GLB,,, | Performed by: NEUROLOGICAL SURGERY

## 2021-06-21 PROCEDURE — 83921 ORGANIC ACID SINGLE QUANT: CPT | Performed by: NEUROLOGICAL SURGERY

## 2021-06-21 PROCEDURE — 82390 ASSAY OF CERULOPLASMIN: CPT | Performed by: NEUROLOGICAL SURGERY

## 2021-06-21 PROCEDURE — 3008F PR BODY MASS INDEX (BMI) DOCUMENTED: ICD-10-PCS | Mod: CPTII,S$GLB,, | Performed by: NEUROLOGICAL SURGERY

## 2021-06-21 RX ORDER — PRIMIDONE 50 MG/1
50 TABLET ORAL 3 TIMES DAILY
Qty: 90 TABLET | Refills: 11 | Status: SHIPPED | OUTPATIENT
Start: 2021-06-21 | End: 2021-07-12 | Stop reason: SDUPTHER

## 2021-06-22 LAB — CERULOPLASMIN SERPL-MCNC: 22 MG/DL (ref 15–45)

## 2021-06-25 ENCOUNTER — PATIENT MESSAGE (OUTPATIENT)
Dept: NEUROLOGY | Facility: CLINIC | Age: 71
End: 2021-06-25

## 2021-06-25 LAB
COPPER SERPL-MCNC: 1063 UG/L (ref 810–1990)
METHYLMALONATE SERPL-SCNC: 0.27 UMOL/L

## 2021-06-26 ENCOUNTER — HOSPITAL ENCOUNTER (OUTPATIENT)
Dept: RADIOLOGY | Facility: HOSPITAL | Age: 71
Discharge: HOME | End: 2021-06-26
Attending: NEUROLOGICAL SURGERY
Payer: MEDICARE

## 2021-06-26 DIAGNOSIS — G25.0 ESSENTIAL TREMOR: ICD-10-CM

## 2021-06-26 PROCEDURE — 70551 MRI BRAIN STEM W/O DYE: CPT | Mod: 26,,, | Performed by: RADIOLOGY

## 2021-06-26 PROCEDURE — 72141 MRI NECK SPINE W/O DYE: CPT | Mod: 26,,, | Performed by: RADIOLOGY

## 2021-06-26 PROCEDURE — 70551 MRI BRAIN WITHOUT CONTRAST: ICD-10-PCS | Mod: 26,,, | Performed by: RADIOLOGY

## 2021-06-26 PROCEDURE — 72141 MRI CERVICAL SPINE WITHOUT CONTRAST: ICD-10-PCS | Mod: 26,,, | Performed by: RADIOLOGY

## 2021-06-26 PROCEDURE — 72141 MRI NECK SPINE W/O DYE: CPT | Mod: TC

## 2021-06-26 PROCEDURE — 70551 MRI BRAIN STEM W/O DYE: CPT | Mod: TC

## 2021-07-07 ENCOUNTER — PATIENT MESSAGE (OUTPATIENT)
Dept: NEUROLOGY | Facility: CLINIC | Age: 71
End: 2021-07-07

## 2021-07-12 ENCOUNTER — PATIENT MESSAGE (OUTPATIENT)
Dept: NEUROLOGY | Facility: CLINIC | Age: 71
End: 2021-07-12

## 2021-07-12 DIAGNOSIS — G25.0 ESSENTIAL TREMOR: ICD-10-CM

## 2021-07-12 RX ORDER — PRIMIDONE 50 MG/1
100 TABLET ORAL 3 TIMES DAILY
Qty: 180 TABLET | Refills: 11 | Status: SHIPPED | OUTPATIENT
Start: 2021-07-12 | End: 2023-01-31

## 2021-08-02 ENCOUNTER — PATIENT MESSAGE (OUTPATIENT)
Dept: NEUROLOGY | Facility: CLINIC | Age: 71
End: 2021-08-02

## 2021-08-13 LAB
BRPFT: NORMAL
DLCO ADJ PRE: 12.17 ML/(MIN*MMHG)
DLCO SINGLE BREATH LLN: 16.59
DLCO SINGLE BREATH PRE REF: 54.5 %
DLCO SINGLE BREATH REF: 22.32
DLCOC SBVA LLN: 2.87
DLCOC SBVA PRE REF: 73 %
DLCOC SBVA REF: 4.21
DLCOC SINGLE BREATH LLN: 16.59
DLCOC SINGLE BREATH PRE REF: 54.5 %
DLCOC SINGLE BREATH REF: 22.32
DLCOVA LLN: 2.87
DLCOVA PRE REF: 73 %
DLCOVA PRE: 3.08 ML/(MIN*MMHG*L)
DLCOVA REF: 4.21
DLVAADJ PRE: 3.08 ML/(MIN*MMHG*L)
ERVN2 LLN: -16449.36
ERVN2 PRE REF: 87.2 %
ERVN2 PRE: 0.56 L
ERVN2 REF: 0.64
FEF 25 75 CHG: 8.6 %
FEF 25 75 LLN: 0.84
FEF 25 75 POST REF: 25.8 %
FEF 25 75 PRE REF: 23.8 %
FEF 25 75 REF: 1.87
FET100 CHG: -0.1 %
FEV1 CHG: 5.4 %
FEV1 FVC CHG: -0.5 %
FEV1 FVC LLN: 64
FEV1 FVC POST REF: 74.4 %
FEV1 FVC PRE REF: 74.8 %
FEV1 FVC REF: 78
FEV1 LLN: 1.65
FEV1 POST REF: 62.8 %
FEV1 PRE REF: 59.5 %
FEV1 REF: 2.29
FRCN2 LLN: 2.01
FRCN2 PRE REF: 83.9 %
FRCN2 REF: 2.84
FVC CHG: 6 %
FVC LLN: 2.15
FVC POST REF: 83.5 %
FVC PRE REF: 78.8 %
FVC REF: 2.98
IVC PRE: 2.18 L
IVC SINGLE BREATH LLN: 2.15
IVC SINGLE BREATH PRE REF: 73.3 %
IVC SINGLE BREATH REF: 2.98
PEF CHG: -2.9 %
PEF LLN: 4
PEF POST REF: 80.1 %
PEF PRE REF: 82.5 %
PEF REF: 5.83
POST FEF 25 75: 0.48 L/S
POST FET 100: 14.86 SEC
POST FEV1 FVC: 57.69 %
POST FEV1: 1.44 L
POST FVC: 2.49 L
POST PEF: 4.67 L/S
PRE DLCO: 12.17 ML/(MIN*MMHG)
PRE FEF 25 75: 0.44 L/S
PRE FET 100: 14.88 SEC
PRE FEV1 FVC: 57.98 %
PRE FEV1: 1.36 L
PRE FRC N2: 2.38 L
PRE FVC: 2.35 L
PRE PEF: 4.81 L/S
RVN2 LLN: 1.62
RVN2 PRE REF: 83 %
RVN2 PRE: 1.82 L
RVN2 REF: 2.19
RVN2TLCN2 LLN: 33.85
RVN2TLCN2 PRE REF: 105.3 %
RVN2TLCN2 PRE: 45.73 %
RVN2TLCN2 REF: 43.44
TLCN2 LLN: 4.31
TLCN2 PRE REF: 75.1 %
TLCN2 PRE: 3.98 L
TLCN2 REF: 5.3
VA PRE: 3.96 L
VA SINGLE BREATH LLN: 5.15
VA SINGLE BREATH PRE REF: 76.9 %
VA SINGLE BREATH REF: 5.15
VCMAXN2 LLN: 2.15
VCMAXN2 PRE REF: 72.5 %
VCMAXN2 PRE: 2.16 L
VCMAXN2 REF: 2.98

## 2021-08-26 ENCOUNTER — OFFICE VISIT (OUTPATIENT)
Dept: NEUROLOGY | Facility: CLINIC | Age: 71
End: 2021-08-26
Payer: MEDICARE

## 2021-08-26 VITALS
BODY MASS INDEX: 25.94 KG/M2 | WEIGHT: 160.69 LBS | HEART RATE: 81 BPM | SYSTOLIC BLOOD PRESSURE: 143 MMHG | DIASTOLIC BLOOD PRESSURE: 86 MMHG

## 2021-08-26 DIAGNOSIS — G25.0 ESSENTIAL TREMOR: Primary | ICD-10-CM

## 2021-08-26 PROCEDURE — 99999 PR PBB SHADOW E&M-EST. PATIENT-LVL III: ICD-10-PCS | Mod: PBBFAC,,, | Performed by: PSYCHIATRY & NEUROLOGY

## 2021-08-26 PROCEDURE — 1159F PR MEDICATION LIST DOCUMENTED IN MEDICAL RECORD: ICD-10-PCS | Mod: CPTII,S$GLB,, | Performed by: PSYCHIATRY & NEUROLOGY

## 2021-08-26 PROCEDURE — 3079F PR MOST RECENT DIASTOLIC BLOOD PRESSURE 80-89 MM HG: ICD-10-PCS | Mod: CPTII,S$GLB,, | Performed by: PSYCHIATRY & NEUROLOGY

## 2021-08-26 PROCEDURE — 99999 PR PBB SHADOW E&M-EST. PATIENT-LVL III: CPT | Mod: PBBFAC,,, | Performed by: PSYCHIATRY & NEUROLOGY

## 2021-08-26 PROCEDURE — 1159F MED LIST DOCD IN RCRD: CPT | Mod: CPTII,S$GLB,, | Performed by: PSYCHIATRY & NEUROLOGY

## 2021-08-26 PROCEDURE — 1126F AMNT PAIN NOTED NONE PRSNT: CPT | Mod: CPTII,S$GLB,, | Performed by: PSYCHIATRY & NEUROLOGY

## 2021-08-26 PROCEDURE — 3079F DIAST BP 80-89 MM HG: CPT | Mod: CPTII,S$GLB,, | Performed by: PSYCHIATRY & NEUROLOGY

## 2021-08-26 PROCEDURE — 3008F PR BODY MASS INDEX (BMI) DOCUMENTED: ICD-10-PCS | Mod: CPTII,S$GLB,, | Performed by: PSYCHIATRY & NEUROLOGY

## 2021-08-26 PROCEDURE — 99214 OFFICE O/P EST MOD 30 MIN: CPT | Mod: S$GLB,,, | Performed by: PSYCHIATRY & NEUROLOGY

## 2021-08-26 PROCEDURE — 3288F PR FALLS RISK ASSESSMENT DOCUMENTED: ICD-10-PCS | Mod: CPTII,S$GLB,, | Performed by: PSYCHIATRY & NEUROLOGY

## 2021-08-26 PROCEDURE — 3077F PR MOST RECENT SYSTOLIC BLOOD PRESSURE >= 140 MM HG: ICD-10-PCS | Mod: CPTII,S$GLB,, | Performed by: PSYCHIATRY & NEUROLOGY

## 2021-08-26 PROCEDURE — 1101F PT FALLS ASSESS-DOCD LE1/YR: CPT | Mod: CPTII,S$GLB,, | Performed by: PSYCHIATRY & NEUROLOGY

## 2021-08-26 PROCEDURE — 3288F FALL RISK ASSESSMENT DOCD: CPT | Mod: CPTII,S$GLB,, | Performed by: PSYCHIATRY & NEUROLOGY

## 2021-08-26 PROCEDURE — 3077F SYST BP >= 140 MM HG: CPT | Mod: CPTII,S$GLB,, | Performed by: PSYCHIATRY & NEUROLOGY

## 2021-08-26 PROCEDURE — 99214 PR OFFICE/OUTPT VISIT, EST, LEVL IV, 30-39 MIN: ICD-10-PCS | Mod: S$GLB,,, | Performed by: PSYCHIATRY & NEUROLOGY

## 2021-08-26 PROCEDURE — 1126F PR PAIN SEVERITY QUANTIFIED, NO PAIN PRESENT: ICD-10-PCS | Mod: CPTII,S$GLB,, | Performed by: PSYCHIATRY & NEUROLOGY

## 2021-08-26 PROCEDURE — 1101F PR PT FALLS ASSESS DOC 0-1 FALLS W/OUT INJ PAST YR: ICD-10-PCS | Mod: CPTII,S$GLB,, | Performed by: PSYCHIATRY & NEUROLOGY

## 2021-08-26 PROCEDURE — 3008F BODY MASS INDEX DOCD: CPT | Mod: CPTII,S$GLB,, | Performed by: PSYCHIATRY & NEUROLOGY

## 2021-09-03 ENCOUNTER — PATIENT MESSAGE (OUTPATIENT)
Dept: NEUROLOGY | Facility: CLINIC | Age: 71
End: 2021-09-03

## 2021-09-15 ENCOUNTER — TELEPHONE (OUTPATIENT)
Dept: NEUROSURGERY | Facility: CLINIC | Age: 71
End: 2021-09-15

## 2021-09-17 ENCOUNTER — TELEPHONE (OUTPATIENT)
Dept: PULMONOLOGY | Facility: CLINIC | Age: 71
End: 2021-09-17

## 2021-09-22 ENCOUNTER — OFFICE VISIT (OUTPATIENT)
Dept: NEUROLOGY | Facility: CLINIC | Age: 71
End: 2021-09-22
Payer: MEDICARE

## 2021-09-22 DIAGNOSIS — R41.89 COGNITIVE CHANGES: ICD-10-CM

## 2021-09-22 DIAGNOSIS — F32.A DEPRESSION, UNSPECIFIED DEPRESSION TYPE: Primary | ICD-10-CM

## 2021-09-22 DIAGNOSIS — G25.0 ESSENTIAL TREMOR: ICD-10-CM

## 2021-09-22 PROCEDURE — 90791 PSYCH DIAGNOSTIC EVALUATION: CPT | Mod: 95,,, | Performed by: CLINICAL NEUROPSYCHOLOGIST

## 2021-09-22 PROCEDURE — 90791 PR PSYCHIATRIC DIAGNOSTIC EVALUATION: ICD-10-PCS | Mod: 95,,, | Performed by: CLINICAL NEUROPSYCHOLOGIST

## 2021-09-22 PROCEDURE — 99499 NO LOS: ICD-10-PCS | Mod: 95,,, | Performed by: CLINICAL NEUROPSYCHOLOGIST

## 2021-09-22 PROCEDURE — 99499 UNLISTED E&M SERVICE: CPT | Mod: 95,,, | Performed by: CLINICAL NEUROPSYCHOLOGIST

## 2021-09-23 ENCOUNTER — OFFICE VISIT (OUTPATIENT)
Dept: NEUROSURGERY | Facility: CLINIC | Age: 71
End: 2021-09-23
Payer: MEDICARE

## 2021-09-23 DIAGNOSIS — G25.0 ESSENTIAL TREMOR: Primary | ICD-10-CM

## 2021-09-23 PROCEDURE — 99205 OFFICE O/P NEW HI 60 MIN: CPT | Mod: 95,,, | Performed by: NEUROLOGICAL SURGERY

## 2021-09-23 PROCEDURE — 99205 PR OFFICE/OUTPT VISIT, NEW, LEVL V, 60-74 MIN: ICD-10-PCS | Mod: 95,,, | Performed by: NEUROLOGICAL SURGERY

## 2021-09-24 ENCOUNTER — PATIENT MESSAGE (OUTPATIENT)
Dept: NEUROSURGERY | Facility: CLINIC | Age: 71
End: 2021-09-24

## 2021-10-14 ENCOUNTER — OFFICE VISIT (OUTPATIENT)
Dept: NEUROLOGY | Facility: CLINIC | Age: 71
End: 2021-10-14
Payer: MEDICARE

## 2021-10-14 DIAGNOSIS — F43.22 ADJUSTMENT DISORDER WITH ANXIOUS MOOD: ICD-10-CM

## 2021-10-14 DIAGNOSIS — F32.A DEPRESSION, UNSPECIFIED DEPRESSION TYPE: ICD-10-CM

## 2021-10-14 DIAGNOSIS — R41.9 COGNITIVE COMPLAINTS: Primary | ICD-10-CM

## 2021-10-14 DIAGNOSIS — G25.0 ESSENTIAL TREMOR: ICD-10-CM

## 2021-10-14 PROCEDURE — 99499 NO LOS: ICD-10-PCS | Mod: S$GLB,,, | Performed by: CLINICAL NEUROPSYCHOLOGIST

## 2021-10-14 PROCEDURE — 99999 PR PBB SHADOW E&M-EST. PATIENT-LVL I: CPT | Mod: PBBFAC,,, | Performed by: CLINICAL NEUROPSYCHOLOGIST

## 2021-10-14 PROCEDURE — 96133 NRPSYC TST EVAL PHYS/QHP EA: CPT | Mod: S$GLB,,, | Performed by: CLINICAL NEUROPSYCHOLOGIST

## 2021-10-14 PROCEDURE — 96139 PR PSYCH/NEUROPSYCH TEST ADMIN/SCORING, BY TECH, 2+ TESTS, EA ADDTL 30 MIN: ICD-10-PCS | Mod: S$GLB,,, | Performed by: CLINICAL NEUROPSYCHOLOGIST

## 2021-10-14 PROCEDURE — 96133 PR NEUROPSYCHOLOGIC TEST EVAL SVCS, EA ADDTL HR: ICD-10-PCS | Mod: S$GLB,,, | Performed by: CLINICAL NEUROPSYCHOLOGIST

## 2021-10-14 PROCEDURE — 99999 PR PBB SHADOW E&M-EST. PATIENT-LVL I: ICD-10-PCS | Mod: PBBFAC,,, | Performed by: CLINICAL NEUROPSYCHOLOGIST

## 2021-10-14 PROCEDURE — 99499 UNLISTED E&M SERVICE: CPT | Mod: S$GLB,,, | Performed by: CLINICAL NEUROPSYCHOLOGIST

## 2021-10-14 PROCEDURE — 96139 PSYCL/NRPSYC TST TECH EA: CPT | Mod: S$GLB,,, | Performed by: CLINICAL NEUROPSYCHOLOGIST

## 2021-10-14 PROCEDURE — 96132 NRPSYC TST EVAL PHYS/QHP 1ST: CPT | Mod: S$GLB,,, | Performed by: CLINICAL NEUROPSYCHOLOGIST

## 2021-10-14 PROCEDURE — 96138 PR PSYCH/NEUROPSYCH TEST ADMIN/SCORING, BY TECH, 2+ TESTS, 1ST 30 MIN: ICD-10-PCS | Mod: S$GLB,,, | Performed by: CLINICAL NEUROPSYCHOLOGIST

## 2021-10-14 PROCEDURE — 96132 PR NEUROPSYCHOLOGIC TEST EVAL SVCS, 1ST HR: ICD-10-PCS | Mod: S$GLB,,, | Performed by: CLINICAL NEUROPSYCHOLOGIST

## 2021-10-14 PROCEDURE — 96138 PSYCL/NRPSYC TECH 1ST: CPT | Mod: S$GLB,,, | Performed by: CLINICAL NEUROPSYCHOLOGIST

## 2021-10-15 ENCOUNTER — PATIENT MESSAGE (OUTPATIENT)
Dept: NEUROLOGY | Facility: CLINIC | Age: 71
End: 2021-10-15
Payer: MEDICARE

## 2021-10-15 PROBLEM — F43.22 ADJUSTMENT DISORDER WITH ANXIOUS MOOD: Status: ACTIVE | Noted: 2021-10-15

## 2021-10-21 ENCOUNTER — OFFICE VISIT (OUTPATIENT)
Dept: PULMONOLOGY | Facility: CLINIC | Age: 71
End: 2021-10-21
Payer: MEDICARE

## 2021-10-21 VITALS
HEIGHT: 66 IN | DIASTOLIC BLOOD PRESSURE: 86 MMHG | TEMPERATURE: 97 F | HEART RATE: 80 BPM | BODY MASS INDEX: 25.74 KG/M2 | OXYGEN SATURATION: 97 % | WEIGHT: 160.19 LBS | SYSTOLIC BLOOD PRESSURE: 142 MMHG

## 2021-10-21 DIAGNOSIS — G25.0 ESSENTIAL TREMOR: ICD-10-CM

## 2021-10-21 DIAGNOSIS — R13.10 DYSPHAGIA, UNSPECIFIED TYPE: ICD-10-CM

## 2021-10-21 DIAGNOSIS — R06.09 DYSPNEA ON EXERTION: ICD-10-CM

## 2021-10-21 PROCEDURE — 99999 PR PBB SHADOW E&M-EST. PATIENT-LVL III: CPT | Mod: PBBFAC,,, | Performed by: INTERNAL MEDICINE

## 2021-10-21 PROCEDURE — 99214 OFFICE O/P EST MOD 30 MIN: CPT | Mod: S$GLB,,, | Performed by: INTERNAL MEDICINE

## 2021-10-21 PROCEDURE — 3079F DIAST BP 80-89 MM HG: CPT | Mod: CPTII,S$GLB,, | Performed by: INTERNAL MEDICINE

## 2021-10-21 PROCEDURE — 1159F PR MEDICATION LIST DOCUMENTED IN MEDICAL RECORD: ICD-10-PCS | Mod: CPTII,S$GLB,, | Performed by: INTERNAL MEDICINE

## 2021-10-21 PROCEDURE — 1159F MED LIST DOCD IN RCRD: CPT | Mod: CPTII,S$GLB,, | Performed by: INTERNAL MEDICINE

## 2021-10-21 PROCEDURE — 3077F SYST BP >= 140 MM HG: CPT | Mod: CPTII,S$GLB,, | Performed by: INTERNAL MEDICINE

## 2021-10-21 PROCEDURE — 3288F FALL RISK ASSESSMENT DOCD: CPT | Mod: CPTII,S$GLB,, | Performed by: INTERNAL MEDICINE

## 2021-10-21 PROCEDURE — 99999 PR PBB SHADOW E&M-EST. PATIENT-LVL III: ICD-10-PCS | Mod: PBBFAC,,, | Performed by: INTERNAL MEDICINE

## 2021-10-21 PROCEDURE — 1126F AMNT PAIN NOTED NONE PRSNT: CPT | Mod: CPTII,S$GLB,, | Performed by: INTERNAL MEDICINE

## 2021-10-21 PROCEDURE — 1101F PR PT FALLS ASSESS DOC 0-1 FALLS W/OUT INJ PAST YR: ICD-10-PCS | Mod: CPTII,S$GLB,, | Performed by: INTERNAL MEDICINE

## 2021-10-21 PROCEDURE — 99214 PR OFFICE/OUTPT VISIT, EST, LEVL IV, 30-39 MIN: ICD-10-PCS | Mod: S$GLB,,, | Performed by: INTERNAL MEDICINE

## 2021-10-21 PROCEDURE — 1101F PT FALLS ASSESS-DOCD LE1/YR: CPT | Mod: CPTII,S$GLB,, | Performed by: INTERNAL MEDICINE

## 2021-10-21 PROCEDURE — 3008F BODY MASS INDEX DOCD: CPT | Mod: CPTII,S$GLB,, | Performed by: INTERNAL MEDICINE

## 2021-10-21 PROCEDURE — 3008F PR BODY MASS INDEX (BMI) DOCUMENTED: ICD-10-PCS | Mod: CPTII,S$GLB,, | Performed by: INTERNAL MEDICINE

## 2021-10-21 PROCEDURE — 3079F PR MOST RECENT DIASTOLIC BLOOD PRESSURE 80-89 MM HG: ICD-10-PCS | Mod: CPTII,S$GLB,, | Performed by: INTERNAL MEDICINE

## 2021-10-21 PROCEDURE — 1126F PR PAIN SEVERITY QUANTIFIED, NO PAIN PRESENT: ICD-10-PCS | Mod: CPTII,S$GLB,, | Performed by: INTERNAL MEDICINE

## 2021-10-21 PROCEDURE — 3077F PR MOST RECENT SYSTOLIC BLOOD PRESSURE >= 140 MM HG: ICD-10-PCS | Mod: CPTII,S$GLB,, | Performed by: INTERNAL MEDICINE

## 2021-10-21 PROCEDURE — 3288F PR FALLS RISK ASSESSMENT DOCUMENTED: ICD-10-PCS | Mod: CPTII,S$GLB,, | Performed by: INTERNAL MEDICINE

## 2021-11-02 ENCOUNTER — TELEPHONE (OUTPATIENT)
Dept: NEUROSURGERY | Facility: CLINIC | Age: 71
End: 2021-11-02
Payer: MEDICARE

## 2021-11-02 DIAGNOSIS — G25.0 ESSENTIAL TREMOR: Primary | ICD-10-CM

## 2021-11-03 ENCOUNTER — TELEPHONE (OUTPATIENT)
Dept: NEUROSURGERY | Facility: CLINIC | Age: 71
End: 2021-11-03
Payer: MEDICARE

## 2021-11-03 DIAGNOSIS — G25.0 ESSENTIAL TREMOR: Primary | ICD-10-CM

## 2021-11-11 ENCOUNTER — OFFICE VISIT (OUTPATIENT)
Dept: NEUROSURGERY | Facility: CLINIC | Age: 71
End: 2021-11-11
Payer: MEDICARE

## 2021-11-11 VITALS
HEART RATE: 77 BPM | HEIGHT: 66 IN | BODY MASS INDEX: 25.71 KG/M2 | SYSTOLIC BLOOD PRESSURE: 143 MMHG | DIASTOLIC BLOOD PRESSURE: 85 MMHG | WEIGHT: 160 LBS | TEMPERATURE: 97 F

## 2021-11-11 DIAGNOSIS — G25.0 ESSENTIAL TREMOR: Primary | ICD-10-CM

## 2021-11-11 PROCEDURE — 3079F PR MOST RECENT DIASTOLIC BLOOD PRESSURE 80-89 MM HG: ICD-10-PCS | Mod: CPTII,S$GLB,, | Performed by: NEUROLOGICAL SURGERY

## 2021-11-11 PROCEDURE — 1160F RVW MEDS BY RX/DR IN RCRD: CPT | Mod: CPTII,S$GLB,, | Performed by: NEUROLOGICAL SURGERY

## 2021-11-11 PROCEDURE — 99999 PR PBB SHADOW E&M-EST. PATIENT-LVL IV: CPT | Mod: PBBFAC,,, | Performed by: NEUROLOGICAL SURGERY

## 2021-11-11 PROCEDURE — 3079F DIAST BP 80-89 MM HG: CPT | Mod: CPTII,S$GLB,, | Performed by: NEUROLOGICAL SURGERY

## 2021-11-11 PROCEDURE — 3077F PR MOST RECENT SYSTOLIC BLOOD PRESSURE >= 140 MM HG: ICD-10-PCS | Mod: CPTII,S$GLB,, | Performed by: NEUROLOGICAL SURGERY

## 2021-11-11 PROCEDURE — 3008F BODY MASS INDEX DOCD: CPT | Mod: CPTII,S$GLB,, | Performed by: NEUROLOGICAL SURGERY

## 2021-11-11 PROCEDURE — 1126F PR PAIN SEVERITY QUANTIFIED, NO PAIN PRESENT: ICD-10-PCS | Mod: CPTII,S$GLB,, | Performed by: NEUROLOGICAL SURGERY

## 2021-11-11 PROCEDURE — 3008F PR BODY MASS INDEX (BMI) DOCUMENTED: ICD-10-PCS | Mod: CPTII,S$GLB,, | Performed by: NEUROLOGICAL SURGERY

## 2021-11-11 PROCEDURE — 1126F AMNT PAIN NOTED NONE PRSNT: CPT | Mod: CPTII,S$GLB,, | Performed by: NEUROLOGICAL SURGERY

## 2021-11-11 PROCEDURE — 99214 OFFICE O/P EST MOD 30 MIN: CPT | Mod: S$GLB,,, | Performed by: NEUROLOGICAL SURGERY

## 2021-11-11 PROCEDURE — 3077F SYST BP >= 140 MM HG: CPT | Mod: CPTII,S$GLB,, | Performed by: NEUROLOGICAL SURGERY

## 2021-11-11 PROCEDURE — 99214 PR OFFICE/OUTPT VISIT, EST, LEVL IV, 30-39 MIN: ICD-10-PCS | Mod: S$GLB,,, | Performed by: NEUROLOGICAL SURGERY

## 2021-11-11 PROCEDURE — 1160F PR REVIEW ALL MEDS BY PRESCRIBER/CLIN PHARMACIST DOCUMENTED: ICD-10-PCS | Mod: CPTII,S$GLB,, | Performed by: NEUROLOGICAL SURGERY

## 2021-11-11 PROCEDURE — 1159F PR MEDICATION LIST DOCUMENTED IN MEDICAL RECORD: ICD-10-PCS | Mod: CPTII,S$GLB,, | Performed by: NEUROLOGICAL SURGERY

## 2021-11-11 PROCEDURE — 1159F MED LIST DOCD IN RCRD: CPT | Mod: CPTII,S$GLB,, | Performed by: NEUROLOGICAL SURGERY

## 2021-11-11 PROCEDURE — 99999 PR PBB SHADOW E&M-EST. PATIENT-LVL IV: ICD-10-PCS | Mod: PBBFAC,,, | Performed by: NEUROLOGICAL SURGERY

## 2021-11-12 ENCOUNTER — TELEPHONE (OUTPATIENT)
Dept: NEUROSURGERY | Facility: CLINIC | Age: 71
End: 2021-11-12
Payer: MEDICARE

## 2021-11-12 DIAGNOSIS — G25.0 ESSENTIAL TREMOR: Primary | ICD-10-CM

## 2021-12-06 ENCOUNTER — TELEPHONE (OUTPATIENT)
Dept: ENDOSCOPY | Facility: HOSPITAL | Age: 71
End: 2021-12-06
Payer: MEDICARE

## 2021-12-06 ENCOUNTER — HOSPITAL ENCOUNTER (OUTPATIENT)
Dept: RADIOLOGY | Facility: HOSPITAL | Age: 71
Discharge: HOME | End: 2021-12-06
Attending: INTERNAL MEDICINE
Payer: MEDICARE

## 2021-12-06 DIAGNOSIS — Z01.818 PREPROCEDURAL EXAMINATION: ICD-10-CM

## 2021-12-06 DIAGNOSIS — Z01.818 PREPROCEDURAL EXAMINATION: Primary | ICD-10-CM

## 2021-12-06 PROCEDURE — 71046 X-RAY EXAM CHEST 2 VIEWS: CPT | Mod: TC,FY

## 2021-12-06 PROCEDURE — 71046 XR CHEST PA AND LATERAL: ICD-10-PCS | Mod: 26,,, | Performed by: RADIOLOGY

## 2021-12-06 PROCEDURE — 71046 X-RAY EXAM CHEST 2 VIEWS: CPT | Mod: 26,,, | Performed by: RADIOLOGY

## 2021-12-10 ENCOUNTER — OFFICE VISIT (OUTPATIENT)
Dept: CARDIOLOGY | Facility: CLINIC | Age: 71
End: 2021-12-10
Payer: MEDICARE

## 2021-12-10 VITALS
HEIGHT: 66 IN | SYSTOLIC BLOOD PRESSURE: 134 MMHG | OXYGEN SATURATION: 98 % | BODY MASS INDEX: 25.71 KG/M2 | WEIGHT: 160 LBS | DIASTOLIC BLOOD PRESSURE: 72 MMHG | HEART RATE: 77 BPM

## 2021-12-10 DIAGNOSIS — R07.89 CHEST PAIN, ATYPICAL: ICD-10-CM

## 2021-12-10 DIAGNOSIS — R06.09 DYSPNEA ON EXERTION: Primary | ICD-10-CM

## 2021-12-10 PROCEDURE — 99204 OFFICE O/P NEW MOD 45 MIN: CPT | Mod: S$GLB,,, | Performed by: INTERNAL MEDICINE

## 2021-12-10 PROCEDURE — 99999 PR PBB SHADOW E&M-EST. PATIENT-LVL III: CPT | Mod: PBBFAC,,, | Performed by: INTERNAL MEDICINE

## 2021-12-10 PROCEDURE — 99999 PR PBB SHADOW E&M-EST. PATIENT-LVL III: ICD-10-PCS | Mod: PBBFAC,,, | Performed by: INTERNAL MEDICINE

## 2021-12-10 PROCEDURE — 93000 ELECTROCARDIOGRAM COMPLETE: CPT | Mod: S$GLB,,, | Performed by: INTERNAL MEDICINE

## 2021-12-10 PROCEDURE — 93000 EKG 12-LEAD: ICD-10-PCS | Mod: S$GLB,,, | Performed by: INTERNAL MEDICINE

## 2021-12-10 PROCEDURE — 99204 PR OFFICE/OUTPT VISIT, NEW, LEVL IV, 45-59 MIN: ICD-10-PCS | Mod: S$GLB,,, | Performed by: INTERNAL MEDICINE

## 2021-12-17 ENCOUNTER — HOSPITAL ENCOUNTER (OUTPATIENT)
Dept: RADIOLOGY | Facility: HOSPITAL | Age: 71
Discharge: HOME | End: 2021-12-17
Attending: NEUROLOGICAL SURGERY
Payer: MEDICARE

## 2021-12-17 DIAGNOSIS — G25.0 ESSENTIAL TREMOR: ICD-10-CM

## 2021-12-17 LAB
CREAT SERPL-MCNC: 0.6 MG/DL (ref 0.5–1.4)
SAMPLE: NORMAL

## 2021-12-17 PROCEDURE — 70553 MRI BRAIN STEM W/O & W/DYE: CPT | Mod: TC

## 2021-12-17 PROCEDURE — 25500020 PHARM REV CODE 255: Performed by: NEUROLOGICAL SURGERY

## 2021-12-17 PROCEDURE — 70553 MRI BRAIN W WO CONTRAST: ICD-10-PCS | Mod: 26,,, | Performed by: RADIOLOGY

## 2021-12-17 PROCEDURE — A9585 GADOBUTROL INJECTION: HCPCS | Performed by: NEUROLOGICAL SURGERY

## 2021-12-17 PROCEDURE — 70553 MRI BRAIN STEM W/O & W/DYE: CPT | Mod: 26,,, | Performed by: RADIOLOGY

## 2021-12-17 RX ORDER — GADOBUTROL 604.72 MG/ML
8 INJECTION INTRAVENOUS
Status: COMPLETED | OUTPATIENT
Start: 2021-12-17 | End: 2021-12-17

## 2021-12-17 RX ADMIN — GADOBUTROL 8 ML: 604.72 INJECTION INTRAVENOUS at 12:12

## 2021-12-22 ENCOUNTER — DOCUMENTATION ONLY (OUTPATIENT)
Dept: NEUROLOGY | Facility: CLINIC | Age: 71
End: 2021-12-22
Payer: MEDICARE

## 2022-01-03 ENCOUNTER — ANESTHESIA EVENT (OUTPATIENT)
Dept: SURGERY | Facility: HOSPITAL | Age: 72
DRG: 027 | End: 2022-01-03
Payer: MEDICARE

## 2022-01-03 NOTE — PRE-PROCEDURE INSTRUCTIONS
Preop instructions(bathing/wear loose fitting clothing/fasting/directions/location of surgery/ and preop medication instructions reviewed with patient). Clear liquids are allowed up to 2 hours before procedure.Clear liquids are:water,apple juice, jello, gatorade & powerade. Patient instructed to hold/stop all blood thinning medications, prior to surgery, following the pre-surgery recommended guidelines. Instructed to follow the surgeon's instructions if they differ from these.    Patient verbalized understanding.    Denies any  history of side effects or issues with anesthesia or sedation.    Patient advised of the updated visitor policy.  Patient aware of the need to have someone drive them home following same -day surgery.      Patient given arrival time of 0500 to [facility]

## 2022-01-03 NOTE — ANESTHESIA PREPROCEDURE EVALUATION
[facility]  Anesthesia Pre-Operative Evaluation         Patient Name: Elsa Steel  YOB: 1950  MRN: [id number]    SUBJECTIVE:     Pre-operative evaluation for Procedure(s) (LRB):  INSERTION, FIDUCIAL SCREW, SKULL (N/A)  INSERTION, DEEP BRAIN STIMULATOR BILATERAL VIM (Bilateral)     01/03/2022    Elsa Steel is a 71 y.o. female w/ a significant PMHx of HTN, essential tremor and anxiety who presents for the above procedure.   Pt reports dyspnea on exertion with ambulation for greater than one block.     Prev airway: None documented    Patient Active Problem List   Diagnosis    Dyspnea on exertion    Dysphagia    Essential tremor    Scanning speech    Depression    Adjustment disorder with anxious mood    Chest pain, atypical       Review of patient's allergies indicates:   Allergen Reactions    Iodine and iodide containing products        Current Inpatient Medications:      No current facility-administered medications on file prior to encounter.     Current Outpatient Medications on File Prior to Encounter   Medication Sig Dispense Refill    amLODIPine (NORVASC) 5 MG tablet Take 5 mg by mouth once daily.      gabapentin (NEURONTIN) 300 MG capsule Take 300 mg by mouth 3 (three) times daily.      primidone (MYSOLINE) 50 MG Tab Take 2 tablets (100 mg total) by mouth 3 (three) times daily. 180 tablet 11    sertraline (ZOLOFT) 100 MG tablet Take 100 mg by mouth once daily.         Past Surgical History:   Procedure Laterality Date    ESOPHAGOGASTRODUODENOSCOPY N/A 5/21/2021    Procedure: EGD (ESOPHAGOGASTRODUODENOSCOPY);  Surgeon: Juan R Watson MD;  [facility];  Service: Endoscopy;  Laterality: N/A;  covid test 5/18 [place], instr portal -ml    HAND SURGERY      HYSTERECTOMY         OBJECTIVE:     Vital Signs Range (Last 24H):         Significant Labs:  Component Value    WBC 5.65    HGB 13.8    HCT 40.4            K 4.3        CREATININE  0.6    BUN 16    CO2 29    INR 1.0    HGBA1C 5.2       Diagnostic Studies: No relevant studies.    EKG:   Results for orders placed or performed in visit on 12/10/21   IN OFFICE EKG 12-LEAD (to [employer])    Collection Time: 12/10/21 10:45 AM    Narrative    Test Reason : R06.00,R07.89,    Vent. Rate : 077 BPM     Atrial Rate : 077 BPM     P-R Int : 174 ms          QRS Dur : 080 ms      QT Int : 412 ms       P-R-T Axes : 047 004 -08 degrees     QTc Int : 466 ms    Normal sinus rhythm  Septal infarct (cited on or before 10-DEC-2021)  Abnormal ECG  When compared with ECG of 22-APR-2021 09:44,  Previous ECG has undetermined rhythm, needs review  Questionable change in The axis  Nonspecific T wave abnormality now evident in Inferior leads  Nonspecific T wave abnormality now evident in Anterior-lateral leads  Confirmed by Carla Mayers MD ([id number]) on 12/11/2021 9:50:24 PM    Referred By: VIOLET POTTER           Confirmed By:Carla Mayers MD          Stress echo 4/22/21  · The left ventricle is normal in size with normal systolic function.  · The estimated ejection fraction is 55%.  · Normal left ventricular diastolic function.  · Normal right ventricular size with normal right ventricular systolic function.  · Mild mitral regurgitation.  · Intermediate central venous pressure (8 mmHg).  · The estimated PA systolic pressure is 38 mmHg.  · Trivial posterior pericardial effusion.  · There were no arrhythmias during stress.  · The ECG portion of this study is negative for myocardial ischemia.  · The stress echo portion of this study is negative for myocardial ischemia.  · Only 79% of maximum predicted heart rate achieved. Consider repeating as a pharmacological nuclear stress test    ASSESSMENT/PLAN:       Anesthesia Evaluation    I have reviewed the Patient Summary Reports.    I have reviewed the Nursing Notes. I have reviewed the NPO Status.   I have reviewed the Medications.     Review of Systems  Anesthesia Hx:  No problems  with previous Anesthesia  Denies Family Hx of Anesthesia complications.   Denies Personal Hx of Anesthesia complications.   EENT/Dental:EENT/Dental Normal   Cardiovascular:   Hypertension    Pulmonary:   Denies Pneumonia Denies COPD.  Denies Asthma. Shortness of breath  Denies Recent URI.    Renal/:  Renal/ Normal     Hepatic/GI:  Hepatic/GI Normal    Musculoskeletal:  Musculoskeletal Normal    Neurological:  Neurology Normal    Endocrine:  Endocrine Normal    Psych:   depression          Physical Exam  General:  Well nourished    Airway/Jaw/Neck:  Airway Findings: Mouth Opening: Normal Tongue: Normal  General Airway Assessment: Adult  TM Distance: Normal, at least 6 cm  Jaw/Neck Findings:  Neck ROM: Normal ROM     Eyes/Ears/Nose:  Eyes/Ears/Nose Findings:    Dental:  DENTAL FINDINGS: Normal   Chest/Lungs:  Chest/Lungs Findings: Clear to auscultation     Heart/Vascular:  Heart Findings: Rate: Normal  Rhythm: Regular Rhythm        Mental Status:  Mental Status Findings:  Cooperative, Alert and Oriented         Anesthesia Plan  Type of Anesthesia, risks & benefits discussed:  Anesthesia Type:  general, MAC    Patient's Preference:   Plan Factors:          Intra-op Monitoring Plan: standard ASA monitors  Intra-op Monitoring Plan Comments:   Post Op Pain Control Plan: per primary service following discharge from PACU  Post Op Pain Control Plan Comments:     Induction:   IV  Beta Blocker:  Patient is not currently on a Beta-Blocker (No further documentation required).       Informed Consent: Patient understands risks and agrees with Anesthesia plan.  Questions answered. Anesthesia consent signed with patient.  ASA Score: 2     Day of Surgery Review of History & Physical:    H&P update referred to the surgeon.         Ready For Surgery From Anesthesia Perspective.

## 2022-01-04 ENCOUNTER — ANESTHESIA (OUTPATIENT)
Dept: SURGERY | Facility: HOSPITAL | Age: 72
DRG: 027 | End: 2022-01-04
Payer: MEDICARE

## 2022-01-04 ENCOUNTER — HOSPITAL ENCOUNTER (INPATIENT)
Facility: HOSPITAL | Age: 72
LOS: 1 days | Discharge: HOME | DRG: 027 | End: 2022-01-05
Attending: NEUROLOGICAL SURGERY | Admitting: NEUROLOGICAL SURGERY
Payer: MEDICARE

## 2022-01-04 DIAGNOSIS — G25.0 ESSENTIAL TREMOR: ICD-10-CM

## 2022-01-04 DIAGNOSIS — R94.31 QT PROLONGATION: ICD-10-CM

## 2022-01-04 LAB
ABO + RH BLD: NORMAL
ANION GAP SERPL CALC-SCNC: 9 MMOL/L (ref 8–16)
APTT BLDCRRT: 26.6 SEC (ref 21–32)
BASOPHILS # BLD AUTO: 0.04 K/UL (ref 0–0.2)
BASOPHILS NFR BLD: 0.5 % (ref 0–1.9)
BLD GP AB SCN CELLS X3 SERPL QL: NORMAL
BUN SERPL-MCNC: 13 MG/DL (ref 8–23)
CALCIUM SERPL-MCNC: 9.3 MG/DL (ref 8.7–10.5)
CHLORIDE SERPL-SCNC: 102 MMOL/L (ref 95–110)
CO2 SERPL-SCNC: 27 MMOL/L (ref 23–29)
CREAT SERPL-MCNC: 0.7 MG/DL (ref 0.5–1.4)
DIFFERENTIAL METHOD: ABNORMAL
EOSINOPHIL # BLD AUTO: 0.4 K/UL (ref 0–0.5)
EOSINOPHIL NFR BLD: 4.7 % (ref 0–8)
ERYTHROCYTE [DISTWIDTH] IN BLOOD BY AUTOMATED COUNT: 13.2 % (ref 11.5–14.5)
EST. GFR  (AFRICAN AMERICAN): >60 ML/MIN/1.73 M^2
EST. GFR  (NON AFRICAN AMERICAN): >60 ML/MIN/1.73 M^2
GLUCOSE SERPL-MCNC: 97 MG/DL (ref 70–110)
HCT VFR BLD AUTO: 41.2 % (ref 37–48.5)
HGB BLD-MCNC: 13.8 G/DL (ref 12–16)
IMM GRANULOCYTES # BLD AUTO: 0.05 K/UL (ref 0–0.04)
IMM GRANULOCYTES NFR BLD AUTO: 0.6 % (ref 0–0.5)
INR PPP: 1 (ref 0.8–1.2)
LYMPHOCYTES # BLD AUTO: 2.1 K/UL (ref 1–4.8)
LYMPHOCYTES NFR BLD: 23.1 % (ref 18–48)
MCH RBC QN AUTO: 27.7 PG (ref 27–31)
MCHC RBC AUTO-ENTMCNC: 33.5 G/DL (ref 32–36)
MCV RBC AUTO: 83 FL (ref 82–98)
MONOCYTES # BLD AUTO: 0.8 K/UL (ref 0.3–1)
MONOCYTES NFR BLD: 9 % (ref 4–15)
NEUTROPHILS # BLD AUTO: 5.5 K/UL (ref 1.8–7.7)
NEUTROPHILS NFR BLD: 62.1 % (ref 38–73)
NRBC BLD-RTO: 0 /100 WBC
PLATELET # BLD AUTO: 257 K/UL (ref 150–450)
PMV BLD AUTO: 10.1 FL (ref 9.2–12.9)
POTASSIUM SERPL-SCNC: 3.6 MMOL/L (ref 3.5–5.1)
PROTHROMBIN TIME: 10.5 SEC (ref 9–12.5)
RBC # BLD AUTO: 4.99 M/UL (ref 4–5.4)
SODIUM SERPL-SCNC: 138 MMOL/L (ref 136–145)
WBC # BLD AUTO: 8.86 K/UL (ref 3.9–12.7)

## 2022-01-04 PROCEDURE — 61868 PR IMPLANT NEUROELECTRDE, ADDÆL: ICD-10-PCS | Mod: 62,,, | Performed by: NEUROLOGICAL SURGERY

## 2022-01-04 PROCEDURE — 61867 PR IMPLANT NEUROELECTRODE W/ RECORDING: ICD-10-PCS | Mod: 62,,, | Performed by: NEUROLOGICAL SURGERY

## 2022-01-04 PROCEDURE — 11000001 HC ACUTE MED/SURG PRIVATE ROOM

## 2022-01-04 PROCEDURE — 25000003 PHARM REV CODE 250: Performed by: STUDENT IN AN ORGANIZED HEALTH CARE EDUCATION/TRAINING PROGRAM

## 2022-01-04 PROCEDURE — 85730 THROMBOPLASTIN TIME PARTIAL: CPT | Performed by: STUDENT IN AN ORGANIZED HEALTH CARE EDUCATION/TRAINING PROGRAM

## 2022-01-04 PROCEDURE — 61867 IMPLANT NEUROELECTRODE: CPT | Mod: 62,,, | Performed by: NEUROLOGICAL SURGERY

## 2022-01-04 PROCEDURE — 27201423 OPTIME MED/SURG SUP & DEVICES STERILE SUPPLY: Performed by: NEUROLOGICAL SURGERY

## 2022-01-04 PROCEDURE — D9220A PRA ANESTHESIA: ICD-10-PCS | Mod: ,,, | Performed by: ANESTHESIOLOGY

## 2022-01-04 PROCEDURE — 36415 COLL VENOUS BLD VENIPUNCTURE: CPT | Performed by: NEUROLOGICAL SURGERY

## 2022-01-04 PROCEDURE — C1778 LEAD, NEUROSTIMULATOR: HCPCS | Performed by: NEUROLOGICAL SURGERY

## 2022-01-04 PROCEDURE — 85025 COMPLETE CBC W/AUTO DIFF WBC: CPT | Performed by: STUDENT IN AN ORGANIZED HEALTH CARE EDUCATION/TRAINING PROGRAM

## 2022-01-04 PROCEDURE — 99900035 HC TECH TIME PER 15 MIN (STAT)

## 2022-01-04 PROCEDURE — 63600175 PHARM REV CODE 636 W HCPCS: Performed by: STUDENT IN AN ORGANIZED HEALTH CARE EDUCATION/TRAINING PROGRAM

## 2022-01-04 PROCEDURE — A4648 IMPLANTABLE TISSUE MARKER: HCPCS | Performed by: NEUROLOGICAL SURGERY

## 2022-01-04 PROCEDURE — D9220A PRA ANESTHESIA: Mod: ,,, | Performed by: ANESTHESIOLOGY

## 2022-01-04 PROCEDURE — 37000008 HC ANESTHESIA 1ST 15 MINUTES: Performed by: NEUROLOGICAL SURGERY

## 2022-01-04 PROCEDURE — 97116 GAIT TRAINING THERAPY: CPT

## 2022-01-04 PROCEDURE — 71000015 HC POSTOP RECOV 1ST HR: Performed by: NEUROLOGICAL SURGERY

## 2022-01-04 PROCEDURE — 25000003 PHARM REV CODE 250: Performed by: NEUROLOGICAL SURGERY

## 2022-01-04 PROCEDURE — 86901 BLOOD TYPING SEROLOGIC RH(D): CPT | Performed by: NEUROLOGICAL SURGERY

## 2022-01-04 PROCEDURE — 95962 PR FUNC CORTICAL MAP,BRAIN,EA ADDN HR: ICD-10-PCS | Mod: 26,,, | Performed by: PSYCHIATRY & NEUROLOGY

## 2022-01-04 PROCEDURE — 95961 PR ELECTRODE STIM,BRAIN,MD 1ST HR: ICD-10-PCS | Mod: 26,,, | Performed by: PSYCHIATRY & NEUROLOGY

## 2022-01-04 PROCEDURE — 36000711: Performed by: NEUROLOGICAL SURGERY

## 2022-01-04 PROCEDURE — 27000221 HC OXYGEN, UP TO 24 HOURS

## 2022-01-04 PROCEDURE — 36000710: Performed by: NEUROLOGICAL SURGERY

## 2022-01-04 PROCEDURE — 94799 UNLISTED PULMONARY SVC/PX: CPT

## 2022-01-04 PROCEDURE — 95962 ELECTRODE STIM BRAIN ADD-ON: CPT | Mod: 26,,, | Performed by: PSYCHIATRY & NEUROLOGY

## 2022-01-04 PROCEDURE — 97161 PT EVAL LOW COMPLEX 20 MIN: CPT

## 2022-01-04 PROCEDURE — 95961 ELECTRODE STIMULATION BRAIN: CPT | Mod: 26,,, | Performed by: PSYCHIATRY & NEUROLOGY

## 2022-01-04 PROCEDURE — 61868 IMPLANT NEUROELECTRDE ADDL: CPT | Mod: 62,,, | Performed by: NEUROLOGICAL SURGERY

## 2022-01-04 PROCEDURE — 71000016 HC POSTOP RECOV ADDL HR: Performed by: NEUROLOGICAL SURGERY

## 2022-01-04 PROCEDURE — 85610 PROTHROMBIN TIME: CPT | Performed by: STUDENT IN AN ORGANIZED HEALTH CARE EDUCATION/TRAINING PROGRAM

## 2022-01-04 PROCEDURE — 37000009 HC ANESTHESIA EA ADD 15 MINS: Performed by: NEUROLOGICAL SURGERY

## 2022-01-04 PROCEDURE — 63600175 PHARM REV CODE 636 W HCPCS: Performed by: NEUROLOGICAL SURGERY

## 2022-01-04 PROCEDURE — 80048 BASIC METABOLIC PNL TOTAL CA: CPT | Performed by: STUDENT IN AN ORGANIZED HEALTH CARE EDUCATION/TRAINING PROGRAM

## 2022-01-04 PROCEDURE — 94761 N-INVAS EAR/PLS OXIMETRY MLT: CPT

## 2022-01-04 PROCEDURE — 71000033 HC RECOVERY, INTIAL HOUR: Performed by: NEUROLOGICAL SURGERY

## 2022-01-04 DEVICE — IMPLANTABLE DEVICE: Type: IMPLANTABLE DEVICE | Site: CRANIAL | Status: FUNCTIONAL

## 2022-01-04 RX ORDER — BUPIVACAINE HYDROCHLORIDE AND EPINEPHRINE 5; 5 MG/ML; UG/ML
INJECTION, SOLUTION EPIDURAL; INTRACAUDAL; PERINEURAL
Status: DISCONTINUED | OUTPATIENT
Start: 2022-01-04 | End: 2022-01-04 | Stop reason: HOSPADM

## 2022-01-04 RX ORDER — ONDANSETRON 8 MG/1
8 TABLET, ORALLY DISINTEGRATING ORAL EVERY 8 HOURS PRN
Status: DISCONTINUED | OUTPATIENT
Start: 2022-01-04 | End: 2022-01-05 | Stop reason: HOSPADM

## 2022-01-04 RX ORDER — HYDRALAZINE HYDROCHLORIDE 20 MG/ML
10 INJECTION INTRAMUSCULAR; INTRAVENOUS EVERY 6 HOURS PRN
Status: DISCONTINUED | OUTPATIENT
Start: 2022-01-04 | End: 2022-01-05

## 2022-01-04 RX ORDER — SODIUM CHLORIDE 9 MG/ML
INJECTION, SOLUTION INTRAVENOUS CONTINUOUS
Status: DISCONTINUED | OUTPATIENT
Start: 2022-01-04 | End: 2022-01-05 | Stop reason: HOSPADM

## 2022-01-04 RX ORDER — MUPIROCIN 20 MG/G
1 OINTMENT TOPICAL 2 TIMES DAILY
Status: DISCONTINUED | OUTPATIENT
Start: 2022-01-04 | End: 2022-01-04 | Stop reason: HOSPADM

## 2022-01-04 RX ORDER — NICARDIPINE HYDROCHLORIDE 2.5 MG/ML
INJECTION INTRAVENOUS
Status: DISCONTINUED | OUTPATIENT
Start: 2022-01-04 | End: 2022-01-04

## 2022-01-04 RX ORDER — SODIUM CHLORIDE 0.9 % (FLUSH) 0.9 %
10 SYRINGE (ML) INJECTION
Status: DISCONTINUED | OUTPATIENT
Start: 2022-01-04 | End: 2022-01-05 | Stop reason: HOSPADM

## 2022-01-04 RX ORDER — AMLODIPINE BESYLATE 5 MG/1
5 TABLET ORAL DAILY
Status: DISCONTINUED | OUTPATIENT
Start: 2022-01-04 | End: 2022-01-05 | Stop reason: HOSPADM

## 2022-01-04 RX ORDER — ACETAMINOPHEN 10 MG/ML
INJECTION, SOLUTION INTRAVENOUS
Status: DISCONTINUED | OUTPATIENT
Start: 2022-01-04 | End: 2022-01-04

## 2022-01-04 RX ORDER — SERTRALINE HYDROCHLORIDE 100 MG/1
100 TABLET, FILM COATED ORAL DAILY
Status: DISCONTINUED | OUTPATIENT
Start: 2022-01-04 | End: 2022-01-05 | Stop reason: HOSPADM

## 2022-01-04 RX ORDER — LABETALOL HYDROCHLORIDE 5 MG/ML
INJECTION, SOLUTION INTRAVENOUS
Status: DISCONTINUED | OUTPATIENT
Start: 2022-01-04 | End: 2022-01-04

## 2022-01-04 RX ORDER — BACITRACIN ZINC 500 UNIT/G
OINTMENT (GRAM) TOPICAL
Status: DISCONTINUED | OUTPATIENT
Start: 2022-01-04 | End: 2022-01-04 | Stop reason: HOSPADM

## 2022-01-04 RX ORDER — ONDANSETRON 2 MG/ML
INJECTION INTRAMUSCULAR; INTRAVENOUS
Status: DISCONTINUED | OUTPATIENT
Start: 2022-01-04 | End: 2022-01-04

## 2022-01-04 RX ORDER — ACETAMINOPHEN 10 MG/ML
1000 INJECTION, SOLUTION INTRAVENOUS ONCE
Status: DISCONTINUED | OUTPATIENT
Start: 2022-01-04 | End: 2022-01-04 | Stop reason: HOSPADM

## 2022-01-04 RX ORDER — HYDROCODONE BITARTRATE AND ACETAMINOPHEN 5; 325 MG/1; MG/1
1 TABLET ORAL EVERY 4 HOURS PRN
Status: DISCONTINUED | OUTPATIENT
Start: 2022-01-04 | End: 2022-01-05 | Stop reason: HOSPADM

## 2022-01-04 RX ORDER — GABAPENTIN 300 MG/1
300 CAPSULE ORAL 3 TIMES DAILY
Status: DISCONTINUED | OUTPATIENT
Start: 2022-01-04 | End: 2022-01-05 | Stop reason: HOSPADM

## 2022-01-04 RX ORDER — CEFTRIAXONE 1 G/1
INJECTION, POWDER, FOR SOLUTION INTRAMUSCULAR; INTRAVENOUS
Status: DISCONTINUED | OUTPATIENT
Start: 2022-01-04 | End: 2022-01-04 | Stop reason: HOSPADM

## 2022-01-04 RX ORDER — MUPIROCIN 20 MG/G
OINTMENT TOPICAL
Status: DISCONTINUED | OUTPATIENT
Start: 2022-01-04 | End: 2022-01-04 | Stop reason: HOSPADM

## 2022-01-04 RX ORDER — ONDANSETRON 2 MG/ML
4 INJECTION INTRAMUSCULAR; INTRAVENOUS EVERY 6 HOURS PRN
Status: DISCONTINUED | OUTPATIENT
Start: 2022-01-04 | End: 2022-01-05 | Stop reason: HOSPADM

## 2022-01-04 RX ORDER — PRIMIDONE 50 MG/1
100 TABLET ORAL 3 TIMES DAILY
Status: DISCONTINUED | OUTPATIENT
Start: 2022-01-04 | End: 2022-01-05 | Stop reason: HOSPADM

## 2022-01-04 RX ORDER — PROPOFOL 10 MG/ML
VIAL (ML) INTRAVENOUS
Status: DISCONTINUED | OUTPATIENT
Start: 2022-01-04 | End: 2022-01-04

## 2022-01-04 RX ORDER — LIDOCAINE HYDROCHLORIDE AND EPINEPHRINE 10; 10 MG/ML; UG/ML
INJECTION, SOLUTION INFILTRATION; PERINEURAL
Status: DISCONTINUED | OUTPATIENT
Start: 2022-01-04 | End: 2022-01-04 | Stop reason: HOSPADM

## 2022-01-04 RX ADMIN — SODIUM CHLORIDE: 0.9 INJECTION, SOLUTION INTRAVENOUS at 07:01

## 2022-01-04 RX ADMIN — PROPOFOL 20 MG: 10 INJECTION, EMULSION INTRAVENOUS at 07:01

## 2022-01-04 RX ADMIN — SODIUM CHLORIDE: 0.9 INJECTION, SOLUTION INTRAVENOUS at 10:01

## 2022-01-04 RX ADMIN — HYDROCODONE BITARTRATE AND ACETAMINOPHEN 1 TABLET: 5; 325 TABLET ORAL at 04:01

## 2022-01-04 RX ADMIN — NICARDIPINE HYDROCHLORIDE 0.1 MG: 2.5 INJECTION INTRAVENOUS at 11:01

## 2022-01-04 RX ADMIN — CEFTRIAXONE SODIUM 2 G: 2 INJECTION, SOLUTION INTRAVENOUS at 07:01

## 2022-01-04 RX ADMIN — HYDRALAZINE HYDROCHLORIDE 10 MG: 20 INJECTION, SOLUTION INTRAMUSCULAR; INTRAVENOUS at 08:01

## 2022-01-04 RX ADMIN — LABETALOL HYDROCHLORIDE 5 MG: 5 INJECTION, SOLUTION INTRAVENOUS at 10:01

## 2022-01-04 RX ADMIN — AMLODIPINE BESYLATE 5 MG: 5 TABLET ORAL at 02:01

## 2022-01-04 RX ADMIN — REMIFENTANIL HYDROCHLORIDE 0.05 MCG/KG/MIN: 1 INJECTION, POWDER, LYOPHILIZED, FOR SOLUTION INTRAVENOUS at 07:01

## 2022-01-04 RX ADMIN — GABAPENTIN 300 MG: 300 CAPSULE ORAL at 02:01

## 2022-01-04 RX ADMIN — ACETAMINOPHEN 1000 MG: 10 INJECTION, SOLUTION INTRAVENOUS at 11:01

## 2022-01-04 RX ADMIN — MUPIROCIN: 20 OINTMENT TOPICAL at 06:01

## 2022-01-04 RX ADMIN — LABETALOL HYDROCHLORIDE 5 MG: 5 INJECTION, SOLUTION INTRAVENOUS at 11:01

## 2022-01-04 RX ADMIN — ONDANSETRON 8 MG: 8 TABLET, ORALLY DISINTEGRATING ORAL at 04:01

## 2022-01-04 RX ADMIN — LABETALOL HYDROCHLORIDE 5 MG: 5 INJECTION, SOLUTION INTRAVENOUS at 08:01

## 2022-01-04 RX ADMIN — PROPOFOL 10 MG: 10 INJECTION, EMULSION INTRAVENOUS at 11:01

## 2022-01-04 RX ADMIN — SERTRALINE HYDROCHLORIDE 100 MG: 100 TABLET ORAL at 02:01

## 2022-01-04 RX ADMIN — LABETALOL HYDROCHLORIDE 5 MG: 5 INJECTION, SOLUTION INTRAVENOUS at 09:01

## 2022-01-04 RX ADMIN — CEFTRIAXONE SODIUM 2 G: 2 INJECTION, SOLUTION INTRAVENOUS at 05:01

## 2022-01-04 RX ADMIN — PRIMIDONE 100 MG: 50 TABLET ORAL at 02:01

## 2022-01-04 RX ADMIN — PROPOFOL 30 MG: 10 INJECTION, EMULSION INTRAVENOUS at 07:01

## 2022-01-04 RX ADMIN — ONDANSETRON 4 MG: 2 INJECTION INTRAMUSCULAR; INTRAVENOUS at 11:01

## 2022-01-04 RX ADMIN — ONDANSETRON 4 MG: 2 INJECTION INTRAMUSCULAR; INTRAVENOUS at 10:01

## 2022-01-04 NOTE — NURSING TRANSFER
Nursing Transfer Note      1/4/2022     Reason patient is being transferred: post op    Transfer To: CT Scan then 905    Transfer via stretcher    Transfer with 02 2 liters NC    Transported by pct    Medicines sent: None    Any special needs or follow-up needed: None    Chart send with patient: Yes    Notified: daughter    Patient reassessed at: 1/4/2021 1300    Upon arrival to floor: patient oriented to room, call bell in reach and bed in lowest position

## 2022-01-04 NOTE — PROCEDURES
DBS IntraOP Brain Mapping    HPI: 70 yo woman with longstanding B/L hand tremor consistent with ET coming for DBS surgery. R>L postural hand tremor.    Target  VIM  Laterality L  Lead Model [id number]  Lead Passes  1    Patient was stimulated over 60 minutes.     Pass1  Mild transient parasthsias  Early and robust tremor control      Final Position  Tip at 2 mm below  Mild transient parasthsias  Early and robust tremor control    _________________________________________    Target  VIM  Laterality R  Lead Model [id number]  Lead Passes  1    Patient was stimulated over 60 minutes.     Pass1      Final Position  Tip at 3 mm below    Dayana Foss MD, MS  [facility]  Department of Neurology  Movement Disorders

## 2022-01-04 NOTE — PLAN OF CARE
PT eval complete, plan of care established    2022    Problem: Physical Therapy Goal  Goal: Physical Therapy Goal  Description: Goals to be met by: 2022     Patient will increase functional independence with mobility by performin. Supine to sit with modified independence  2. Sit to supine with modified independence  3. Sit to stand transfer with modified independence  4. Gait  x 100 feet with modified independence using LRAD as needed  5. Ascend/descend 13 stair with right  handrails supervision using LRAD as needed  6. Lower extremity exercise program x10 reps per handout, with independence   Outcome: Ongoing, Progressing

## 2022-01-04 NOTE — PT/OT/SLP EVAL
"Physical Therapy Evaluation and Treatment    Patient Name:  Elsa Steel   MRN:  [id number]    Recommendations:     Discharge Recommendations:  home health PT   Discharge Equipment Recommendations:  (DME TBD, RW pending gait trial)   Barriers to discharge: None    Assessment:     Elsa Steel is a 71 y.o. female admitted with a medical diagnosis of <principal problem not specified>.  She presents with the following impairments/functional limitations:  weakness,impaired endurance,impaired self care skills,impaired functional mobilty,gait instability,impaired balance,decreased upper extremity function,decreased lower extremity function. These deficits affect their roles and responsibilities in which they were able to complete prior to admit. Elsa Steel would benefit from acute PT intervention to improve quality of life and focus on recovery of impairments. Once medically stable, recommending pt discharge to Home Health PT. Patient tolerated evaluation well, would benefit from AD trial at next visit for increased gait stability.    Rehab Prognosis: Good; patient would benefit from acute skilled PT services 4 x/week to address these deficits and reach maximum level of function. Patient is most appropriate to go to home health PT.  Recent Surgery: Procedure(s) (LRB):  INSERTION, FIDUCIAL SCREW, SKULL (N/A)  INSERTION, DEEP BRAIN STIMULATOR BILATERAL VIM (Bilateral) Day of Surgery    Plan:     During this hospitalization, patient to be seen 4 x/week to address the identified rehab impairments via gait training,therapeutic activities,therapeutic exercises,neuromuscular re-education and progress toward the following goals:    · Plan of Care Expires:  02/04/22    Subjective     Chief Complaint: Headache  Patient/Family Comments/Goals: "Sorry, my eyes hurt"  Pain/Comfort:  · Pain Rating 1: 4/10  · Location - Orientation 1: generalized  · Location 1: head  · Pain Addressed 1: Distraction,Cessation of Activity  · Pain Rating " Post-Intervention 1: 6/10    Patients cultural, spiritual, Mosque conflicts given the current situation: no    Living Environment:  Patient lives alone in a 2-story house, number of outside stairs: 0, number of inside stairs:13 with R HR, can live on first level, tub-shower combo.  Prior to admission, patient was independent with ADLs, driving, not working. Patient uses DME as follows: none. DME owned (not currently used): none. Upon discharge, patient will have assistance from family. Patient reports her daughter lives nearby and can assist.    Objective:     Communicated with nursing prior to session.  Patient found HOB elevated with harding catheter,peripheral IV upon PT entry to room.    General Precautions: Standard, fall   Orthopedic Precautions:N/A   Braces: N/A    Exams:  · Cognitive Exam:  Patient is oriented to Person, Place, Time, Situation, follows commands 100% of the time  · RLE ROM: WFL  · RLE Strength: WFL  · LLE ROM: WFL  · LLE Strength: WFL  · Sensation: Intact light touch to BLEs   · Coordination: WFL speed and accuracy heel to shin     Functional Mobility:  · Bed Mobility:     · Rolling Left:  stand by assistance  · Supine to Sit: stand by assistance  · Sit to Supine: stand by assistance  · Transfers:     · Sit to Stand: contact guard assistance with no AD  · Gait: Patient ambulated 18 ft with hand-held assist and minimum assistance. Patient demonstrates occasional unsteady gait, decreased step length and narrow base of support. Patient requires verbal cues for safety. All lines remained intact throughout ambulation trial.  · Balance:   · Static Sitting: Good, able to maintain for 3 minute(s) with supervision  · Dynamic Sitting: Fair: Patient accepts minimal challenge, supervision  · Static Standing: Good, able to maintain for 10 seconds with contact guard assistance  · Dynamic Standing: Fair: Patient accepts minimal challenge, minimum assistance    Therapeutic Activities and  Exercises:  Patient educated on role of acute care PT and PT POC  Whiteboard updated   Provided set-up assistance for self-feeding    AM-PAC 6 CLICK MOBILITY  Total Score:18     Patient left HOB elevated with all lines intact, call button in reach, RN notified and bed alarm on.    GOALS:   Multidisciplinary Problems     Physical Therapy Goals        Problem: Physical Therapy Goal    Goal Priority Disciplines Outcome Goal Variances Interventions   Physical Therapy Goal     PT, PT/OT Ongoing, Progressing     Description: Goals to be met by: 2022     Patient will increase functional independence with mobility by performin. Supine to sit with modified independence  2. Sit to supine with modified independence  3. Sit to stand transfer with modified independence  4. Gait  x 100 feet with modified independence using LRAD as needed  5. Ascend/descend 13 stair with right  handrails supervision using LRAD as needed  6. Lower extremity exercise program x10 reps per handout, with independence                    History:     Past Medical History:   Diagnosis Date    Dyslipidemia     HTN (hypertension)     Tremor        Past Surgical History:   Procedure Laterality Date    ESOPHAGOGASTRODUODENOSCOPY N/A 2021    Procedure: EGD (ESOPHAGOGASTRODUODENOSCOPY);  Surgeon: Juan R Watson MD;  Location: [facility];  Service: Endoscopy;  Laterality: N/A;  covid test  algiers, instr portal -ml    HAND SURGERY      HYSTERECTOMY         Time Tracking:     PT Received On: 22  PT Start Time: 1620     PT Stop Time: 1635  PT Total Time (min): 15 min     Billable Minutes: Evaluation 7 min Gait Training 8 min    2022

## 2022-01-04 NOTE — OP NOTE
Date of Operation:  1/04/2022.    Preoperative Diagnosis:  Essential Tremor    Postoperative Diagnosis:  Essential Tremor.    Procedure:  Placement of skull fiducial screws for intraoperative neuro navigation.  Bilateral implantation of deep brain stimulating electrodes in VIM thalamic nuclei with micro electrode recording (Angel Siddiqui and Pipo).    Surgeon:  Neal Hernández MD    Co Surgeon:  Robina Barney MD    Assistant: Jose Cooper MD (Resident in Neurosurgery)    Anesthesia:  Local/MAC    Estimated Blood Loss:  Less than 50 ml.    Condition at End of Procedure:  Satisfactory.    Brief History:  This 71-year-old lady began to no tremor of her upper extremity these her early 30s.  She has a strong family history of tremor.  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  She has been on propanolol and Mysoline but with only minimal benefit and was decided to proceed with deep brain stimulation to try to manage the tremor better.    Procedure in Detail:  The patient had navigation MRI performed.  She was brought to the operating room on her gurney and given mild IV sedation.  A Maynard catheter was inserted, sequential compression devices applied to the legs, and various intravenous line started.  The head of the rBiddle was elevated and the head shaved, prepped with Betadine and draped in a sterile fashion.  Five fiducial marker spots were made with a crayon, 2 frontotemporal, 2 parietal, and 1 just to the right of midline and each site was injected with 1% xylocaine and epinephrine.  At each location a stab wound was made with a 15 blade and the pericranium scraped from the skull.  A 10 mm skull fiducial screw was inserted with the power screwdriver and tightened by hand.  All screws fit snugly.  The small incisions were closed with interrupted 3-0 nylon sutures and reinjected with 0.5% Marcaine and epinephrine.   The scalp was washed, bacitracin ointment applied to the screws and the protective caps placed over them.  The patient's head was wrapped with roller gauze and she was brought to CT scan where CT scan was obtained to merge with MRI for intraoperative neuro navigation.    With CT done the patient was returned to the operating room and transferred to the operating table and placed in the beach chair position with the head allowed to rest on a Ricks horseshoe which had been padded.  The navigation program was worked out on the computer.  The roller gauze was removed and the protective caps taken off of the fiducial screws.  The nonsterile reference frame was attached to the scalp and the screws registered to the system with good tolerance.  The proposed entry sites in the posterior left and right frontal areas were marked with a crayon.  The nonsterile reference frame was removed.  The scalp was prepped with Betadine and small horseshoe incisions outlined around the entry sites.  The skull was marked bilaterally with an 18 gauge needle and bone awl.  The scalp was covered with a Betadine square and a plastic shower curtain drape.  Operation was begun on the left side.  The incision was carried through the skin and galea and pericranium, bleeding controlled with bipolar coagulation and the small scalp flap elevated and retracted anteriorly with a 2-0 silk suture and rubber band.  A 14 mm shira hole was made at the entrance site and widened internally with the M8 attachment.  Bleeding on the dura was controlled with bipolar coagulation and Gelfoam.  The shira hole cover was then affixed to the skull tightening these 2 screws and the NexFrame placed over this tightening the 3 screws to the skull also.  The socket assembly was placed on to the ring base and the probe used to find the trajectory and depth to target and these were marked on the micro drive.  The probe was exchanged for the multi lumen channel adapter and  the Alpha Newman drive head stage with its associated cables affixed to the ring base over the channel adapter.  The cannula was brought down through the center hole to the dura, the dura was coagulated opened with an 11 blade and the cannula advanced down to 15 mm above target.  The micro electrode call it was attached to the drive headstage and the microelectrode brought down through the cannula.  With Dr. Siddiqui and Dr. Foss doing microelectrode recording the the microelectrode was advanced through the VIM thalamic nucleus with reasonable recordings.  Microelectrode stimulation was carried out.  At the deepest point she did have some transient paresthesia but this disappeared as the electrode was slowly brought up and she seemed to get quite good control on tremor.  It was decided to place the DBS electrode 1 mm below target.  The 7.5 mm SenSight 3937 electrode was inserted and testing carried out with with stimulation at successive more proximal channels and she seemed to have very good response without untoward side effects.  The DBS electrode was capped into place and the micro drive removed the locking cap was placed over the electrode and the boot attached to the end of the electrode out this was covered with a moist sponge.    Attention was then turned to the right side.  The exact same procedure was carried out on this side.  The small skin flap was raised and retracted.  A 14 mm shira hole was made and the bur hole cover and NexFrame ring affixed to the skull.  An extra screw was placed in the NexFrame ring to be sure it was secure.  The socket assembly was then placed onto this with the NexProbe and again trajectory and depth to target were marked on the micro drive.  The multi lumen channel adapter was inserted and the Alpha Newman head drive stage reattached to the ring base.  The microelectrode was again brought down through the center hole, the dura coagulated opened and the cannula advanced to 15 mm  above target.  The stylet was removed, the microelectrode call it attached to the micro drive and the microelectrode brought down through the cannula.  Recordings again were fairly brisk at about 7 mm above target and became somewhat less right around target.  However stimulation a mm or 2 below target actually gave quite good tremor control without paresthesias and this continued up to between 5 and 6 mm above target.  On this side a 10 mm SenSight directional lead was placed with the tip 3 mm below target to cover the affected area.  Testing again gave excellent response to stimulation without untoward side effects and the electrode was capped into place.  The micro drive assembly was removed and the locking cap placed over it.  The scalp between the 2 openings was dissected and the right-sided electrode brought over to the left.  The boot was attached to this electrode and marked with a 2-0 silk suture on the tip.  The patient was given a little additional anesthesia and the 2 electrodes with the lead kits buried above and behind her left ear.  The wounds were then thoroughly irrigated, the galea was closed with inverted interrupted 4-0 Vicryl sutures and the skin closed with skin staples.  The fiducial screws removed and the small incisions closed with staples also.  The scalp was washed and the incisions covered with bacitracin ointment and Telfa and the head wrapped with roller gauze.  She was transferred back to her [facility] and returned to the recovery area in satisfactory condition.  She received 2 g of Rocephin at the beginning of the procedure and Rocephin containing antibiotic irrigating solutions were used throughout.

## 2022-01-04 NOTE — BRIEF OP NOTE
[facility] - Surgery ([facility])  Brief Operative Note    SUMMARY     Surgery Date: 1/4/2022     Surgeon(s) and Role:     * Neal Hernández MD - Primary     * Jose Cooper MD - Resident - Assisting     * Robina Barney MD - Co-Surgeon        Pre-op Diagnosis:  Essential tremor [G25.0]    Post-op Diagnosis:  Post-Op Diagnosis Codes:     * Essential tremor [G25.0]    Procedure(s) (LRB):  INSERTION, FIDUCIAL SCREW, SKULL (N/A)  INSERTION, DEEP BRAIN STIMULATOR BILATERAL VIM (Bilateral)    Anesthesia: Local MAC    Operative Findings: good lead placement     Estimated Blood Loss: 10cc         Specimens:   Specimen (24h ago, onward)            None          [id number]

## 2022-01-04 NOTE — TRANSFER OF CARE
"Anesthesia Transfer of Care Note    Patient: Elsa Steel    Procedure(s) Performed: Procedure(s) (LRB):  INSERTION, FIDUCIAL SCREW, SKULL (N/A)  INSERTION, DEEP BRAIN STIMULATOR BILATERAL VIM (Bilateral)    Patient location: PACU    Anesthesia Type: MAC    Transport from OR: Transported from OR on 2-3 L/min O2 by NC with adequate spontaneous ventilation    Post pain: adequate analgesia    Post assessment: no apparent anesthetic complications    Post vital signs: stable    Level of consciousness: awake and alert    Nausea/Vomiting: no nausea/vomiting    Complications: none    Transfer of care protocol was followed      Last vitals:   Visit Vitals  /77   Pulse 67   Temp 36.3 °C (97.3 °F) (Temporal)   Resp 15   Ht 5' 6" (1.676 m)   Wt 72.6 kg (160 lb)   SpO2 95%   Breastfeeding No   BMI 25.82 kg/m²     "

## 2022-01-04 NOTE — H&P
I have reviewed the below progress note. There are no interval changes to the history or physical examination, and I concur with the findings. Indications, risks, benefits, and alternatives of surgery discussed with patient. She voices understanding and elects to proceed.     Jose Juan Armendariz MD  Neurosurgery  [facility]      Elsa Steel was seen in neurosurgical consultation at the office this morning.  She is a 71-year-old lady who began to note tremor of the upper extremities in early 30s.  Her mother and grandfather apparently had tremor and her son is beginning to show tremor also.  Tremor has progressed to where she is having difficulty using her arms and hands effectively.  She notes if something is heavy in her hand tremor is pretty well controlled but with something light like a fork or makeup that tremor is severe.  She has begun to note some tremor of her head and voice also.  She has had no specific rigidity or difficulty initiating movement.  She has had no difficulty with gait or balance.  She has been treated with propanolol and Mysoline with only modest benefit.  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  Past medical history includes hypertension treated with Norvasc.  She otherwise has no specific medical issues.  She has been [facility] with Dr. Bennett and has had neurosurgical consultation with Dr. Barney.  She has also had neuropsychological evaluation by Dr. Iraheta.     On physical examination she is a well-developed, well-nourished white lady who is alert and cooperative.  Examination of the head is unremarkable.  Eyes show full extraocular movements with a little nystagmus.  Pupils are equal reactive to light.  She has had bilateral cataract operations and the fundi are normal.  Hearing is good to finger rubbing bilaterally and the neck is supple.  On neurological  examination she is speaking clearly and provides a good history.  There is a slight wobble to her voice and a bit of head nodding.  She shows minimal tremor when her hands are resting in her lap but as soon as she holds up her arms there is an oscillating tremor increased with finger-to-nose.  She shows no rigidity in the extremities.  Cranial nerves otherwise intact, she has normal facial sensation and movement of the tongue protrudes in the midline.  She shows good strength extremities, normal sensation, and brisk symmetrical deep tendon reflexes.     MRI of the brain was done at [facility] on 06/26/2021.  This is not a navigation scan.  There are no abnormal findings on the scan.     Impression:  Essential tremor.      Recommendation:  Where planning deep brain stimulation in the bilateral VIM thalamic nuclei in the next month or so.  I explained how stereotaxy with MRI and CT scan with skull fiducial screws placed is used to place the DBS electrodes.  We use microelectrode recording and intraoperative microstimulation to assure good placement.  The pulse generator is placed the following week under general anesthesia.         Electronically signed by Neal Hernández MD at 11/11/2021 10:47 AM

## 2022-01-05 VITALS
HEIGHT: 66 IN | DIASTOLIC BLOOD PRESSURE: 63 MMHG | BODY MASS INDEX: 25.71 KG/M2 | WEIGHT: 160 LBS | SYSTOLIC BLOOD PRESSURE: 121 MMHG | HEART RATE: 88 BPM | TEMPERATURE: 100 F | OXYGEN SATURATION: 92 % | RESPIRATION RATE: 15 BRPM

## 2022-01-05 LAB
ANION GAP SERPL CALC-SCNC: 10 MMOL/L (ref 8–16)
BASOPHILS # BLD AUTO: 0.04 K/UL (ref 0–0.2)
BASOPHILS NFR BLD: 0.3 % (ref 0–1.9)
BUN SERPL-MCNC: 9 MG/DL (ref 8–23)
CALCIUM SERPL-MCNC: 8.8 MG/DL (ref 8.7–10.5)
CHLORIDE SERPL-SCNC: 103 MMOL/L (ref 95–110)
CO2 SERPL-SCNC: 24 MMOL/L (ref 23–29)
CREAT SERPL-MCNC: 0.6 MG/DL (ref 0.5–1.4)
DIFFERENTIAL METHOD: ABNORMAL
EOSINOPHIL # BLD AUTO: 0 K/UL (ref 0–0.5)
EOSINOPHIL NFR BLD: 0 % (ref 0–8)
ERYTHROCYTE [DISTWIDTH] IN BLOOD BY AUTOMATED COUNT: 13 % (ref 11.5–14.5)
EST. GFR  (AFRICAN AMERICAN): >60 ML/MIN/1.73 M^2
EST. GFR  (NON AFRICAN AMERICAN): >60 ML/MIN/1.73 M^2
GLUCOSE SERPL-MCNC: 134 MG/DL (ref 70–110)
HCT VFR BLD AUTO: 39.2 % (ref 37–48.5)
HGB BLD-MCNC: 13.3 G/DL (ref 12–16)
IMM GRANULOCYTES # BLD AUTO: 0.09 K/UL (ref 0–0.04)
IMM GRANULOCYTES NFR BLD AUTO: 0.6 % (ref 0–0.5)
LYMPHOCYTES # BLD AUTO: 1.1 K/UL (ref 1–4.8)
LYMPHOCYTES NFR BLD: 7.5 % (ref 18–48)
MAGNESIUM SERPL-MCNC: 1.7 MG/DL (ref 1.6–2.6)
MCH RBC QN AUTO: 27.2 PG (ref 27–31)
MCHC RBC AUTO-ENTMCNC: 33.9 G/DL (ref 32–36)
MCV RBC AUTO: 80 FL (ref 82–98)
MONOCYTES # BLD AUTO: 1.3 K/UL (ref 0.3–1)
MONOCYTES NFR BLD: 8.9 % (ref 4–15)
NEUTROPHILS # BLD AUTO: 11.7 K/UL (ref 1.8–7.7)
NEUTROPHILS NFR BLD: 82.7 % (ref 38–73)
NRBC BLD-RTO: 0 /100 WBC
PHOSPHATE SERPL-MCNC: 2.7 MG/DL (ref 2.7–4.5)
PLATELET # BLD AUTO: 255 K/UL (ref 150–450)
PMV BLD AUTO: 10 FL (ref 9.2–12.9)
POTASSIUM SERPL-SCNC: 3.8 MMOL/L (ref 3.5–5.1)
RBC # BLD AUTO: 4.89 M/UL (ref 4–5.4)
SODIUM SERPL-SCNC: 137 MMOL/L (ref 136–145)
WBC # BLD AUTO: 14.08 K/UL (ref 3.9–12.7)

## 2022-01-05 PROCEDURE — 63600175 PHARM REV CODE 636 W HCPCS: Performed by: STUDENT IN AN ORGANIZED HEALTH CARE EDUCATION/TRAINING PROGRAM

## 2022-01-05 PROCEDURE — 85025 COMPLETE CBC W/AUTO DIFF WBC: CPT | Performed by: STUDENT IN AN ORGANIZED HEALTH CARE EDUCATION/TRAINING PROGRAM

## 2022-01-05 PROCEDURE — 99024 PR POST-OP FOLLOW-UP VISIT: ICD-10-PCS | Mod: ,,, | Performed by: PHYSICIAN ASSISTANT

## 2022-01-05 PROCEDURE — 99024 POSTOP FOLLOW-UP VISIT: CPT | Mod: ,,, | Performed by: PHYSICIAN ASSISTANT

## 2022-01-05 PROCEDURE — 80048 BASIC METABOLIC PNL TOTAL CA: CPT | Performed by: STUDENT IN AN ORGANIZED HEALTH CARE EDUCATION/TRAINING PROGRAM

## 2022-01-05 PROCEDURE — 83735 ASSAY OF MAGNESIUM: CPT | Performed by: STUDENT IN AN ORGANIZED HEALTH CARE EDUCATION/TRAINING PROGRAM

## 2022-01-05 PROCEDURE — 25000003 PHARM REV CODE 250: Performed by: STUDENT IN AN ORGANIZED HEALTH CARE EDUCATION/TRAINING PROGRAM

## 2022-01-05 PROCEDURE — 84100 ASSAY OF PHOSPHORUS: CPT | Performed by: STUDENT IN AN ORGANIZED HEALTH CARE EDUCATION/TRAINING PROGRAM

## 2022-01-05 PROCEDURE — 25000003 PHARM REV CODE 250: Performed by: PHYSICIAN ASSISTANT

## 2022-01-05 PROCEDURE — 93005 ELECTROCARDIOGRAM TRACING: CPT

## 2022-01-05 PROCEDURE — 99900035 HC TECH TIME PER 15 MIN (STAT)

## 2022-01-05 PROCEDURE — 93010 ELECTROCARDIOGRAM REPORT: CPT | Mod: ,,, | Performed by: INTERNAL MEDICINE

## 2022-01-05 PROCEDURE — 97530 THERAPEUTIC ACTIVITIES: CPT

## 2022-01-05 PROCEDURE — 97165 OT EVAL LOW COMPLEX 30 MIN: CPT

## 2022-01-05 PROCEDURE — 94761 N-INVAS EAR/PLS OXIMETRY MLT: CPT

## 2022-01-05 PROCEDURE — 93010 EKG 12-LEAD: ICD-10-PCS | Mod: ,,, | Performed by: INTERNAL MEDICINE

## 2022-01-05 PROCEDURE — 36415 COLL VENOUS BLD VENIPUNCTURE: CPT | Performed by: STUDENT IN AN ORGANIZED HEALTH CARE EDUCATION/TRAINING PROGRAM

## 2022-01-05 PROCEDURE — 27100171 HC OXYGEN HIGH FLOW UP TO 24 HOURS

## 2022-01-05 RX ORDER — HYDROCODONE BITARTRATE AND ACETAMINOPHEN 5; 325 MG/1; MG/1
1 TABLET ORAL EVERY 6 HOURS PRN
Qty: 10 TABLET | Refills: 0 | Status: ON HOLD | OUTPATIENT
Start: 2022-01-05 | End: 2022-01-11 | Stop reason: SDUPTHER

## 2022-01-05 RX ORDER — ACETAMINOPHEN 325 MG/1
650 TABLET ORAL EVERY 6 HOURS PRN
Refills: 0 | Status: ON HOLD
Start: 2022-01-05 | End: 2022-01-11 | Stop reason: HOSPADM

## 2022-01-05 RX ORDER — ONDANSETRON 8 MG/1
8 TABLET, ORALLY DISINTEGRATING ORAL EVERY 6 HOURS PRN
Qty: 15 TABLET | Refills: 0 | Status: SHIPPED | OUTPATIENT
Start: 2022-01-05 | End: 2022-01-20

## 2022-01-05 RX ORDER — SCOLOPAMINE TRANSDERMAL SYSTEM 1 MG/1
1 PATCH, EXTENDED RELEASE TRANSDERMAL
Status: DISCONTINUED | OUTPATIENT
Start: 2022-01-05 | End: 2022-01-05 | Stop reason: HOSPADM

## 2022-01-05 RX ORDER — HYDRALAZINE HYDROCHLORIDE 25 MG/1
25 TABLET, FILM COATED ORAL EVERY 6 HOURS PRN
Status: DISCONTINUED | OUTPATIENT
Start: 2022-01-05 | End: 2022-01-05 | Stop reason: HOSPADM

## 2022-01-05 RX ORDER — SCOLOPAMINE TRANSDERMAL SYSTEM 1 MG/1
1 PATCH, EXTENDED RELEASE TRANSDERMAL
Start: 2022-01-08 | End: 2022-01-20

## 2022-01-05 RX ORDER — ACETAMINOPHEN 325 MG/1
650 TABLET ORAL EVERY 6 HOURS PRN
Status: DISCONTINUED | OUTPATIENT
Start: 2022-01-05 | End: 2022-01-05 | Stop reason: HOSPADM

## 2022-01-05 RX ADMIN — HYDRALAZINE HYDROCHLORIDE 25 MG: 25 TABLET, FILM COATED ORAL at 08:01

## 2022-01-05 RX ADMIN — PRIMIDONE 100 MG: 50 TABLET ORAL at 02:01

## 2022-01-05 RX ADMIN — GABAPENTIN 300 MG: 300 CAPSULE ORAL at 03:01

## 2022-01-05 RX ADMIN — PRIMIDONE 100 MG: 50 TABLET ORAL at 08:01

## 2022-01-05 RX ADMIN — ACETAMINOPHEN 650 MG: 325 TABLET ORAL at 03:01

## 2022-01-05 RX ADMIN — HYDROCODONE BITARTRATE AND ACETAMINOPHEN 1 TABLET: 5; 325 TABLET ORAL at 09:01

## 2022-01-05 RX ADMIN — AMLODIPINE BESYLATE 5 MG: 5 TABLET ORAL at 08:01

## 2022-01-05 RX ADMIN — SERTRALINE HYDROCHLORIDE 100 MG: 100 TABLET ORAL at 08:01

## 2022-01-05 RX ADMIN — ONDANSETRON 8 MG: 8 TABLET, ORALLY DISINTEGRATING ORAL at 08:01

## 2022-01-05 RX ADMIN — PROMETHAZINE HYDROCHLORIDE 6.25 MG: 25 INJECTION INTRAMUSCULAR; INTRAVENOUS at 01:01

## 2022-01-05 RX ADMIN — GABAPENTIN 300 MG: 300 CAPSULE ORAL at 08:01

## 2022-01-05 RX ADMIN — SCOPALAMINE 1 PATCH: 1 PATCH, EXTENDED RELEASE TRANSDERMAL at 11:01

## 2022-01-05 NOTE — ASSESSMENT & PLAN NOTE
Patient with history of essential tremor now s/p bilateral VIM lead placement for DBS on 1/5/22    - Neuro stable  - Incisions with staples intact. Bacitracin bid x 14 days to incisions.   - Post op pain control: minimize opioid use as much as possible to avoid post op N/V and rebound headache. Tylenol prn for headache  - CTH: expected post operative changes with post pneumocephalus  - Post op pneumocephalus: placed on non rebreather  - PONV: zofran, phenergan prn. scopalamine patch ordered. EKG to evaluate QTc  - Continue home medications  - DISPO: home pending improvement in PONV and tolerating diet     Discussed with Dr. Barney

## 2022-01-05 NOTE — PROGRESS NOTES
[facility] - Neurosurgery ([facility])  Neurosurgery  Progress Note    Subjective:     History of Present Illness: Elsa Steel was seen in neurosurgical consultation at the office this morning.  She is a 71-year-old lady who began to note tremor of the upper extremities in early 30s.  Her mother and grandfather apparently had tremor and her son is beginning to show tremor also.  Tremor has progressed to where she is having difficulty using her arms and hands effectively.  She notes if something is heavy in her hand tremor is pretty well controlled but with something light like a fork or makeup that tremor is severe.  She has begun to note some tremor of her head and voice also.  She has had no specific rigidity or difficulty initiating movement.  She has had no difficulty with gait or balance.  She has been treated with propanolol and Mysoline with only modest benefit.  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  Past medical history includes hypertension treated with Norvasc.  She otherwise has no specific medical issues.  She has been followed in the [facility] with Dr. Bennett and has had neurosurgical consultation with Dr. Barney.  She has also had neuropsychological evaluation by Dr. Iraheta.      Post-Op Info:  Procedure(s) (LRB):  INSERTION, FIDUCIAL SCREW, SKULL (N/A)  INSERTION, DEEP BRAIN STIMULATOR BILATERAL VIM (Bilateral)   1 Day Post-Op     Interval History: Nausea/vomiting overnight. Patient reports headache this AM. CTH with expected post op changes, post op pneumocephalus. Patient placed on non rebreather for pneumocephalus. Scopalmine patch added for nausea. Home pending po intake and improvement in nausea.     Medications:  Continuous Infusions:   sodium chloride 0.9% Stopped (01/05/22 0528)     Scheduled Meds:   amLODIPine  5 mg Oral Daily    gabapentin  300 mg Oral TID    primidone  100 mg Oral TID     scopolamine  1 patch Transdermal Q3 Days    sertraline  100 mg Oral Daily     PRN Meds:hydrALAZINE, HYDROcodone-acetaminophen, ondansetron, ondansetron, promethazine (PHENERGAN) IVPB, sodium chloride 0.9%     Review of Systems  Objective:     Weight: 72.6 kg (160 lb)  Body mass index is 25.82 kg/m².  Vital Signs (Most Recent):  Temp: 98.6 °F (37 °C) (01/05/22 0732)  Pulse: 93 (01/05/22 0732)  Resp: 18 (01/05/22 0903)  BP: (!) 178/100 (01/05/22 0748)  SpO2: (!) 93 % (01/05/22 0732) Vital Signs (24h Range):  Temp:  [97.3 °F (36.3 °C)-99.9 °F (37.7 °C)] 98.6 °F (37 °C)  Pulse:  [] 93  Resp:  [14-19] 18  SpO2:  [90 %-97 %] 93 %  BP: (112-197)/() 178/100                          Neurosurgery Physical Exam  General: well developed, well nourished, no distress.   Head: normocephalic  Neurologic: Alert and oriented. Thought content appropriate.  GCS: Motor: 6/Verbal: 5/Eyes: 4 GCS Total: 15  Mental Status: Awake, Alert, Oriented x 4  Language: No aphasia  Speech: No dysarthria  Cranial nerves: face symmetric, tongue midline, CN II-XII grossly intact.   Eyes: pupils equal, round, reactive to light with accommodation, EOMI.   Pulmonary: normal respirations, no signs of respiratory distress  Abdomen: soft, non-distended, not tender to palpation  Skin: Skin is warm, dry and intact.  Sensory: intact to light touch throughout    Motor Strength:Moves all extremities spontaneously with good tone.  Full strength upper and lower extremities. No abnormal movements seen.     Strength  Deltoids Triceps Biceps Wrist Extension Wrist Flexion Hand    Upper: R 5/5 5/5 5/5 5/5 5/5 5/5    L 5/5 5/5 5/5 5/5 5/5 5/5     Iliopsoas Quadriceps Knee  Flexion Tibialis  anterior Gastro- cnemius EHL   Lower: R 5/5 5/5 5/5 5/5 5/5 5/5    L 5/5 5/5 5/5 5/5 5/5 5/5     Cerebellar:   Finger-to-nose: intact bilaterally   Pronator drift: absent bilaterally    Cranial Incisions: Clean, dry, staples intact. Skin edges well approximated. No  surrounding erythema or edema. No drainage or TTP.       Significant Labs:  Recent Labs   Lab 01/04/22  0620 01/05/22  0651   GLU 97 134*    137   K 3.6 3.8    103   CO2 27 24   BUN 13 9   CREATININE 0.7 0.6   CALCIUM 9.3 8.8   MG  --  1.7     Recent Labs   Lab 01/04/22  0620 01/05/22  0651   WBC 8.86 14.08*   HGB 13.8 13.3   HCT 41.2 39.2    255     Recent Labs   Lab 01/04/22  0620   INR 1.0   APTT 26.6     Microbiology Results (last 7 days)     ** No results found for the last 168 hours. **        All pertinent labs from the last 24 hours have been reviewed.    Significant Diagnostics:  CT Head Stealth W/O Contrast    Result Date: 1/4/2022  Expected interval operative change status post bifrontal deep brain stimulator device placement.  Small volume postop pneumocephalus overlies the frontal lobes bilaterally. No significant volume acute intracranial hemorrhage or definite sulcal effacement allowing for artifact from the metallic hardware. Clinical correlation and continued follow-up advised. Electronically signed by: Jerzy Moraes DO Date:    01/04/2022 Time:    13:57      Assessment/Plan:     * Essential tremor  Patient with history of essential tremor now s/p bilateral VIM lead placement for DBS on 1/5/22    - Neuro stable  - Incisions with staples intact. Bacitracin bid x 14 days to incisions.   - Post op pain control: minimize opioid use as much as possible to avoid post op N/V and rebound headache. Tylenol prn for headache  - CTH: expected post operative changes with post pneumocephalus  - Post op pneumocephalus: placed on non rebreather  - PONV: zofran, phenergan prn. scopalamine patch ordered. EKG to evaluate QTc  - Continue home medications  - DISPO: home pending improvement in PONV and tolerating diet     Discussed with Dr. Ector Napoles PAJarettC  Neurosurgery  Nader irwin - Neurosurgery ([facility])

## 2022-01-05 NOTE — PLAN OF CARE
[facility] - Neurosurgery (Hospital)  Initial Discharge Assessment       Primary Care Provider: David Olson MD    Admission Diagnosis: Essential tremor [G25.0]    Admission Date: 1/4/2022  Expected Discharge Date: 1/5/2022    Discharge Barriers Identified: None    Payor: [employer]ED [employer]ICARE / Plan: [employer] 65 / Product Type: Medicare Advantage /     Extended Emergency Contact Information  Primary Emergency Contact: Alok Vargas   [place]  Mobile Phone: [phone]  Relation: Son    Discharge Plan A: Home Health  Discharge Plan B: Home      [id number] - [place], [place] - [address]  [address]  [place] [address]  Phone: [phone] Fax: [phone]      Initial Assessment (most recent)     Adult Discharge Assessment - 01/05/22 1444        Discharge Assessment    Assessment Type Discharge Planning Assessment     Confirmed/corrected address, phone number and insurance Yes     Confirmed Demographics Correct on Facesheet     Source of Information patient     Communicated TRI with patient/caregiver Yes     Reason For Admission dbs     Lives With alone     Do you expect to return to your current living situation? Yes     Do you have help at home or someone to help you manage your care at home? Yes     Who are your caregiver(s) and their phone number(s)? Alok Vargas - son [phone]     Prior to hospitilization cognitive status: Alert/Oriented     Current cognitive status: Alert/Oriented     Walking or Climbing Stairs Difficulty none     Dressing/Bathing Difficulty none     Equipment Currently Used at Home none     Readmission within 30 days? No     Patient currently being followed by outpatient case management? No     Do you currently have service(s) that help you manage your care at home? No     Do you take prescription medications? Yes     Do you have prescription coverage? Yes     Coverage [employer]  MANAGED MEDICARE - [employer] 65     Do you have any problems affording any of your prescribed medications? No     Is the patient taking medications as prescribed? yes     Who is going to help you get home at discharge? family/friend     How do you get to doctors appointments? family or friend will provide     Are you on dialysis? No     Do you take coumadin? No     Discharge Plan A Home Health     Discharge Plan B Home     DME Needed Upon Discharge  none     Discharge Plan discussed with: Patient     Discharge Barriers Identified None

## 2022-01-05 NOTE — HOSPITAL COURSE
1/5: Nausea/vomiting overnight. Patient reports headache this AM. CTH with expected post op changes, post op pneumocephalus. Patient placed on non rebreather for pneumocephalus. Scopalmine patch added for nausea. Home pending po intake and improvement in nausea.

## 2022-01-05 NOTE — H&P (VIEW-ONLY)
[facility] - Neurosurgery ([facility])  Neurosurgery  Progress Note    Subjective:     History of Present Illness: Elsa Steel was seen in neurosurgical consultation at the office this morning.  She is a 71-year-old lady who began to note tremor of the upper extremities in early 30s.  Her mother and grandfather apparently had tremor and her son is beginning to show tremor also.  Tremor has progressed to where she is having difficulty using her arms and hands effectively.  She notes if something is heavy in her hand tremor is pretty well controlled but with something light like a fork or makeup that tremor is severe.  She has begun to note some tremor of her head and voice also.  She has had no specific rigidity or difficulty initiating movement.  She has had no difficulty with gait or balance.  She has been treated with propanolol and Mysoline with only modest benefit.  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  Past medical history includes hypertension treated with Norvasc.  She otherwise has no specific medical issues.  She has been followed in the [facility] with Dr. Bennett and has had neurosurgical consultation with Dr. Barney.  She has also had neuropsychological evaluation by Dr. Iraheta.      Post-Op Info:  Procedure(s) (LRB):  INSERTION, FIDUCIAL SCREW, SKULL (N/A)  INSERTION, DEEP BRAIN STIMULATOR BILATERAL VIM (Bilateral)   1 Day Post-Op     Interval History: Nausea/vomiting overnight. Patient reports headache this AM. CTH with expected post op changes, post op pneumocephalus. Patient placed on non rebreather for pneumocephalus. Scopalmine patch added for nausea. Home pending po intake and improvement in nausea.     Medications:  Continuous Infusions:   sodium chloride 0.9% Stopped (01/05/22 0528)     Scheduled Meds:   amLODIPine  5 mg Oral Daily    gabapentin  300 mg Oral TID    primidone  100 mg Oral TID     scopolamine  1 patch Transdermal Q3 Days    sertraline  100 mg Oral Daily     PRN Meds:hydrALAZINE, HYDROcodone-acetaminophen, ondansetron, ondansetron, promethazine (PHENERGAN) IVPB, sodium chloride 0.9%     Review of Systems  Objective:     Weight: 72.6 kg (160 lb)  Body mass index is 25.82 kg/m².  Vital Signs (Most Recent):  Temp: 98.6 °F (37 °C) (01/05/22 0732)  Pulse: 93 (01/05/22 0732)  Resp: 18 (01/05/22 0903)  BP: (!) 178/100 (01/05/22 0748)  SpO2: (!) 93 % (01/05/22 0732) Vital Signs (24h Range):  Temp:  [97.3 °F (36.3 °C)-99.9 °F (37.7 °C)] 98.6 °F (37 °C)  Pulse:  [] 93  Resp:  [14-19] 18  SpO2:  [90 %-97 %] 93 %  BP: (112-197)/() 178/100                          Neurosurgery Physical Exam  General: well developed, well nourished, no distress.   Head: normocephalic  Neurologic: Alert and oriented. Thought content appropriate.  GCS: Motor: 6/Verbal: 5/Eyes: 4 GCS Total: 15  Mental Status: Awake, Alert, Oriented x 4  Language: No aphasia  Speech: No dysarthria  Cranial nerves: face symmetric, tongue midline, CN II-XII grossly intact.   Eyes: pupils equal, round, reactive to light with accommodation, EOMI.   Pulmonary: normal respirations, no signs of respiratory distress  Abdomen: soft, non-distended, not tender to palpation  Skin: Skin is warm, dry and intact.  Sensory: intact to light touch throughout    Motor Strength:Moves all extremities spontaneously with good tone.  Full strength upper and lower extremities. No abnormal movements seen.     Strength  Deltoids Triceps Biceps Wrist Extension Wrist Flexion Hand    Upper: R 5/5 5/5 5/5 5/5 5/5 5/5    L 5/5 5/5 5/5 5/5 5/5 5/5     Iliopsoas Quadriceps Knee  Flexion Tibialis  anterior Gastro- cnemius EHL   Lower: R 5/5 5/5 5/5 5/5 5/5 5/5    L 5/5 5/5 5/5 5/5 5/5 5/5     Cerebellar:   Finger-to-nose: intact bilaterally   Pronator drift: absent bilaterally    Cranial Incisions: Clean, dry, staples intact. Skin edges well approximated. No  surrounding erythema or edema. No drainage or TTP.       Significant Labs:  Recent Labs   Lab 01/04/22  0620 01/05/22  0651   GLU 97 134*    137   K 3.6 3.8    103   CO2 27 24   BUN 13 9   CREATININE 0.7 0.6   CALCIUM 9.3 8.8   MG  --  1.7     Recent Labs   Lab 01/04/22  0620 01/05/22  0651   WBC 8.86 14.08*   HGB 13.8 13.3   HCT 41.2 39.2    255     Recent Labs   Lab 01/04/22  0620   INR 1.0   APTT 26.6     Microbiology Results (last 7 days)     ** No results found for the last 168 hours. **        All pertinent labs from the last 24 hours have been reviewed.    Significant Diagnostics:  CT Head Stealth W/O Contrast    Result Date: 1/4/2022  Expected interval operative change status post bifrontal deep brain stimulator device placement.  Small volume postop pneumocephalus overlies the frontal lobes bilaterally. No significant volume acute intracranial hemorrhage or definite sulcal effacement allowing for artifact from the metallic hardware. Clinical correlation and continued follow-up advised. Electronically signed by: Jerzy Moraes DO Date:    01/04/2022 Time:    13:57      Assessment/Plan:     * Essential tremor  Patient with history of essential tremor now s/p bilateral VIM lead placement for DBS on 1/5/22    - Neuro stable  - Incisions with staples intact. Bacitracin bid x 14 days to incisions.   - Post op pain control: minimize opioid use as much as possible to avoid post op N/V and rebound headache. Tylenol prn for headache  - CTH: expected post operative changes with post pneumocephalus  - Post op pneumocephalus: placed on non rebreather  - PONV: zofran, phenergan prn. scopalamine patch ordered. EKG to evaluate QTc  - Continue home medications  - DISPO: home pending improvement in PONV and tolerating diet     Discussed with Dr. Ector Napoles PAJarettC  Neurosurgery  Nader irwin - Neurosurgery ([facility])

## 2022-01-05 NOTE — PROGRESS NOTES
Discharge instructions reviewed and printout left with patient. Pt verbalized understanding, questions encouraged and addressed. Pt AAOx4, VSS, no c/o distress at time of discharge. PIV and cardiac monitor removed by nurse, pt tolerated well. Pt to leave unit via wheelchair transport with personal belongings.

## 2022-01-05 NOTE — DISCHARGE INSTRUCTIONS
Please follow ONLY the instructions that are checked below.    Activity Restrictions:  [x]  Return to work will be determined on an individual basis.  [x]  No lifting greater than 10 pounds.  [x]  No driving or operating machinery:  [x]  until cleared by your surgeon.  []  while taking narcotic pain medications or muscle relaxants.  [x]  Increase ambulation over the next 2 weeks so that you are walking 2 miles per day at 2 weeks post-operatively.  [x]  Walk on paved surfaces only. It is okay to walk up and down stairs while holding onto a side rail.  [x]  No sexual activity for 2-3 weeks.    Discharge Medication/Follow-up:  [x]  Please refer to discharge medication reconciliation form.  [x]  Do not take ANY non-steroidal anti-inflammatory drugs (NSAIDS), including the following: ibuprofen, naprosyn, Aleve, Advil, Indocin, Mobic, or Celebrex for:  []  4 weeks  [x]  8 weeks  []  6 months  [x]  Prescriptions for appropriate medication will be given upon discharge.      Wound Care:  [x]  No bandage required. Keep your incision open to the air.  [x]  You may shower on the 2nd day after your surgery. Have the force of water hit you opposite from the incision. Pat the incision dry after your shower; do not scrub the incision.  [x]  You cannot take a bath until 8 weeks after surgery.  [x]  Apply bacitracin to incision twice a day for 14 more days.    Call your doctor or go to the Emergency Room for any signs of infection, including: increased redness, drainage, pain, or fever (temperature ?101.5 for 24 hours). Call your doctor or go to the Emergency Room if there are any localized neurological changes; problems with speech, vision, numbness, tingling, weakness, or severe headache; or for other concerns.    Special Instructions:  []  No use of tobacco products.  [x]  Diet: Please eat a regular diet as tolerated.  []  Other diet:              Specific physician instructions:           Physicians need 3 days' notice for pain  medicine to be refilled. Pain medicine will only be refilled between 8 AM and 5 PM, Monday through Friday, due to Food and Drug Administration regulation of documentation.    If you have any questions about this form, please call [phone].    Form No. 91266 (Revised 10/31/2013)

## 2022-01-05 NOTE — CARE UPDATE
Patient with significant improvement in headache. Nausea/vomiting completely resolved. Patient able to tolerate lunch without issue. Stable for discharge home. Discharge instructions reviewed with patient and family. She voiced understanding. All of her questions were answered    Dee Napoles PA-C   [phone]  Neurosurgery  [facility]

## 2022-01-05 NOTE — OP NOTE
[facility] - Neurosurgery ([facility])  Neurosurgery  Operative Note    SUMMARY      Date of Procedure: 1/4/2022     Procedure: Procedure(s) (LRB):  INSERTION, FIDUCIAL SCREW, SKULL (N/A)  INSERTION, DEEP BRAIN STIMULATOR BILATERAL VIM (Bilateral)     Surgeon(s) and Role:     * Neal Hernández MD - Primary     * Jose Cooper MD - Resident - Assisting     * Robina Barney MD - Co-Surgeon    Pre-Operative Diagnosis: Essential tremor [G25.0]    Post-Operative Diagnosis: Post-Op Diagnosis Codes:     * Essential tremor [G25.0]    Anesthesia: Local MAC    Technical Procedures Used:  1. Placement of bilateral Vim electrode for deep brain stimulation for essential tremor (Medtronic Sensight)   2. Use of neuronavigation   3. Microelectrode recording   4. Intraoperative testing and stimulation      Indications:   Elsa Steel is a 71 y.o. female referred by Dr. Foss for DBS therapy.      We had a lengthy discussion about the risks, benefits, and alternatives to deep brain stimulation. Risks include, but are not limited to, bleeding, pain, infection, scarring, need for further/repeat procedure, speech difficulty, balance difficulty, cognitive changes, loss of inhibition, intracranial hemorrhage, venous infarct, seizure, stroke, paralysis, and death.  Hardware-related and programming-related complications may also occur.  This is an implanted device, meaning that any infection elsewhere in the body must be treated immediately to minimize the risk of blood stream infection seeding the device.  Should this happen, it is possible that the entire system would require removal. We also discussed that there are some reports that a minority of patients with ET may become refractory to DBS stimulation after about 10 years.      We also reviewed the work flow employed at [facility] for placement of deep brain stimulation devices.  We discussed that she would be admitted on a Tuesday morning for placement of 5 fiducial screws.  After the  fiducial screws are placed, she would be transferred to CT scan to obtain a fiducial registration CT scan.  This would be merged with the already obtained MRI.  An operative plan would be created.  She would be brought back to the operating room for definitive placement of the DBS lead.  This is to be done with the patient awake.  She expressed understanding thereof.  The patient will then be brought back the following week for placement of extension cables and pulse generator under general anesthesia on outpatient basis. The device would subsequently be turned on approximately 2 weeks later by our movement disorders neurologist in the clinic.     Description of the Procedure:   The patient was placed in the supine position on the operating table and given mild IV sedation. Her hair was clipped, and the scalp was prepped and draped in the usual sterile fashion. Five points were indicated for fiducial screws: one one each side just superior to the superior temporal line, one on each side over the parietal bone, and one just to the right of the vertex. Each of the sites was injected with 2cc of 1% lidocaine with epinephrine.      A stab incision was carried down through the galea, and the pericranium was scraped off the bone. Using the hand-held battery-powered screwdriver, the 10 mm bone fiducial was affixed to the skull. Once good purchase was confirmed with the manual , a 3.0 nylon stitch was used to secure the skin. The fiducial was dressed with bacitracin ointment, and a protective cap applied. This same process was repeated exactly for each of the other four screws. A sterile headwrap was then applied, and the patient was taken to the CT scanner for a navigation study.      The CT was merged with a previously created plan based on MRI findings and consensus coordinates for the bilateral Vim thalamus. The merge was found to be satisfactory. The patient was carefully repositioned on the operating room  table.      A Maynard catheter was inserted, sequential compression devices applied to legs, and the indicated intravenous lines started by anesthesia.  The head was somewhat elevated and allowed to rest on a Ricks horseshoe, which had been padded.  The head wrap was removed and the protective caps taken off the fiducial screws. These were all in good position.  The nonsterile reference frame was then attached to the scalp and the fiducials registered to the system with excellent tolerance and appropriate accuracy (0.6mm).  The navigation program was used to find the proposed entry site in the posterior frontal area bilaterally and marked with a marking pen.  The nonsterile reference frame was removed.  The scalp was prepped with Betadine.  The bone was marked with an 18-gauge needle and a bone awl and the Ioban shower curtain drape placed over the scalp.      Starting on the left, a small horseshoe incision was outlined around the entry site and injected with 1% Xylocaine and epinephrine. The incision was carried through the skin and galea and pericranium.  Bleeding was controlled with bipolar coagulation, the small flap elevated and retracted with a 2-0 silk suture and a rubber band.  A 14-mm shira hole was made with a  and undercut with the M8 attachment and Kerrison rongeur, particularly laterally. The bone was liberally waxed. Some gel foam soaked in thrombin was placed in the hole.  The bur hole anchoring base was then placed over the shira hole and these 2 screws affixed to the skull.  The NexFrame ring was placed over the base and these 3 screws tightened to the skull also.  The sterile reference arc was attached to the NexFrame ring and the skull fiducials re-registered to the system again with acceptable accuracy (0.6 mm).  The socket assembly was placed onto the NexFrame ring and the probe used to find the trajectory and depth to target (80 mm).  We added 75 to this, setting the NexFrame adaptor  from [employer] [place] to 155. The probe was exchanged for the multilumen channel adapter and the NexFrame adaptor assembly placed on to this and locked into place.  The guide cannula was brought down through the center of the Dejuan Gun. The dura and sina were coagulated and opened with a #11 blade, and the guide tube advanced to 25 mm above target.  The stylet was removed. The electrode thomas stage was attached to the Alpha Omega headstage and its attendant cables on the back table before being fitted to the adaptor. The microelectrode was brought down through the guide tube to 15 mm above target.       With Angel Siddiqui, Pipo, and Edgar performing microelectrode recording and physiological testing, the electrode was slowly advanced through the thalamus.  Satisfactory recordings were obtained. We performed stimulation.  This resulted in satisfactory improvement in tremor without significant side effects.      The tip of the DBS electrode (5 spacing) was thus placed at 2 mm below target on the central path and again tested.  This placement was found to be satisfactory with improvement in rigidity and tremor without significant adverse effects. The electrode was then capped into place and the microdrive assembly removed.  The NexFrame ring was unscrewed from the skull and the electrode capped into place with the locking cap.  The end of the electrode was protected with a lead kit. 10 cc of marcaine were injected.     The exact same process was then repeated on the right side. Here, we elected to place the electrode a bit deeper at 3.5 mm below target as we were using 15 spacing instead of 5 on this electrode.      The scalp was dissected and the leads buried behind the left ear.  The wounds were then thoroughly irrigated.      The horseshoe incision was closed with inverted interrupted 4-0 Vicryl sutures in the galea and skin staples.  The fiducial screws were removed with a screwdriver and these small incisions closed  with staples also.  Telfa and bacitracin dressings were applied to the scalp and the head re-wrapped with a roller gauze.  The patient remained alert through the procedure and was transferred back to the [facility] and returned to PACU in satisfactory  condition. She received ceftriaxone at the beginning of the procedure, and ceftriaxone-containing antibiotic irrigating solutions were used throughout.     Two staff neurosurgeons were required so Dr. Hernández could participate fully with microelectrode recording.       Complications: No     Blood loss: <20 cc                     Specimens:   Specimen (24h ago, onward)            None           Implants:   Implant Name Type Inv. Item Serial No.  Lot No. LRB No. Used Action   FIDUCIAL KIT UNI STEREO 10 MM - [id number]  FIDUCIAL KIT UNI STEREO 10 MM  [employer] [facility] [id number]  5 Implanted and Explanted   SenSight Directional Lead Kit   [id number] [employer]  Left 1 Implanted   SenSight Directional Lead Kit    [id number] [employer]  Right 1 Implanted              Condition: Stable    Disposition: PACU - hemodynamically stable.    Attestation: I was present and scrubbed for the entire procedure.

## 2022-01-05 NOTE — HPI
Elsa Steel was seen in neurosurgical consultation at the office this morning.  She is a 71-year-old lady who began to note tremor of the upper extremities in early 30s.  Her mother and grandfather apparently had tremor and her son is beginning to show tremor also.  Tremor has progressed to where she is having difficulty using her arms and hands effectively.  She notes if something is heavy in her hand tremor is pretty well controlled but with something light like a fork or makeup that tremor is severe.  She has begun to note some tremor of her head and voice also.  She has had no specific rigidity or difficulty initiating movement.  She has had no difficulty with gait or balance.  She has been treated with propanolol and Mysoline with only modest benefit.  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  Past medical history includes hypertension treated with Norvasc.  She otherwise has no specific medical issues.  She has been followed in the [facility] with Dr. Bennett and has had neurosurgical consultation with Dr. Barney.  She has also had neuropsychological evaluation by Dr. Iraheta.

## 2022-01-05 NOTE — PLAN OF CARE
Problem: Adult Inpatient Plan of Care  Goal: Plan of Care Review  Outcome: Ongoing, Progressing     Patient is AAO x4. POC reviewed with patient and daughter. Patient verbalized understanding. Patient's breathing is unlabored with equal chest expansion. Patient has an incision to head with rolled gauze in place. Patient complains of nausea, vomiting, and diarrhea;PRN antiemetic given per order. Patient was able to find some nausea relief with the IV phenergan. Patient's SBP was elevated;PRN hydralazine given x 1 during shift. Patient ambulates to the restroom with standby assist. Patient remained free from falls. Patient rested well through shift. Bed in lowest position,bed alarm on, side rails up x3, seizure precautions in place, no complaints or signs of distress. WCTM.  See flowsheets for full assessment and VS info.  Patient's harding removed at 5:30am. Patient due to void by 11:30am on 1/5/22.

## 2022-01-05 NOTE — NURSING
Paged NSX concerning patient's nausea/vomiting & diarrhea. Informed NSX that PO Zofran was given without relief. Continuous NS and IV Zofran ordered. Awaiting pharmacy's confirmation. GURPREET.

## 2022-01-05 NOTE — SUBJECTIVE & OBJECTIVE
Interval History: Nausea/vomiting overnight. Patient reports headache this AM. CTH with expected post op changes, post op pneumocephalus. Patient placed on non rebreather for pneumocephalus. Scopalmine patch added for nausea. Home pending po intake and improvement in nausea.     Medications:  Continuous Infusions:   sodium chloride 0.9% Stopped (01/05/22 0528)     Scheduled Meds:   amLODIPine  5 mg Oral Daily    gabapentin  300 mg Oral TID    primidone  100 mg Oral TID    scopolamine  1 patch Transdermal Q3 Days    sertraline  100 mg Oral Daily     PRN Meds:hydrALAZINE, HYDROcodone-acetaminophen, ondansetron, ondansetron, promethazine (PHENERGAN) IVPB, sodium chloride 0.9%     Review of Systems  Objective:     Weight: 72.6 kg (160 lb)  Body mass index is 25.82 kg/m².  Vital Signs (Most Recent):  Temp: 98.6 °F (37 °C) (01/05/22 0732)  Pulse: 93 (01/05/22 0732)  Resp: 18 (01/05/22 0903)  BP: (!) 178/100 (01/05/22 0748)  SpO2: (!) 93 % (01/05/22 0732) Vital Signs (24h Range):  Temp:  [97.3 °F (36.3 °C)-99.9 °F (37.7 °C)] 98.6 °F (37 °C)  Pulse:  [] 93  Resp:  [14-19] 18  SpO2:  [90 %-97 %] 93 %  BP: (112-197)/() 178/100                          Neurosurgery Physical Exam  General: well developed, well nourished, no distress.   Head: normocephalic  Neurologic: Alert and oriented. Thought content appropriate.  GCS: Motor: 6/Verbal: 5/Eyes: 4 GCS Total: 15  Mental Status: Awake, Alert, Oriented x 4  Language: No aphasia  Speech: No dysarthria  Cranial nerves: face symmetric, tongue midline, CN II-XII grossly intact.   Eyes: pupils equal, round, reactive to light with accommodation, EOMI.   Pulmonary: normal respirations, no signs of respiratory distress  Abdomen: soft, non-distended, not tender to palpation  Skin: Skin is warm, dry and intact.  Sensory: intact to light touch throughout    Motor Strength:Moves all extremities spontaneously with good tone.  Full strength upper and lower extremities. No  abnormal movements seen.     Strength  Deltoids Triceps Biceps Wrist Extension Wrist Flexion Hand    Upper: R 5/5 5/5 5/5 5/5 5/5 5/5    L 5/5 5/5 5/5 5/5 5/5 5/5     Iliopsoas Quadriceps Knee  Flexion Tibialis  anterior Gastro- cnemius EHL   Lower: R 5/5 5/5 5/5 5/5 5/5 5/5    L 5/5 5/5 5/5 5/5 5/5 5/5     Cerebellar:   Finger-to-nose: intact bilaterally   Pronator drift: absent bilaterally    Cranial Incisions: Clean, dry, staples intact. Skin edges well approximated. No surrounding erythema or edema. No drainage or TTP.       Significant Labs:  Recent Labs   Lab 01/04/22  0620 01/05/22  0651   GLU 97 134*    137   K 3.6 3.8    103   CO2 27 24   BUN 13 9   CREATININE 0.7 0.6   CALCIUM 9.3 8.8   MG  --  1.7     Recent Labs   Lab 01/04/22  0620 01/05/22  0651   WBC 8.86 14.08*   HGB 13.8 13.3   HCT 41.2 39.2    255     Recent Labs   Lab 01/04/22  0620   INR 1.0   APTT 26.6     Microbiology Results (last 7 days)     ** No results found for the last 168 hours. **        All pertinent labs from the last 24 hours have been reviewed.    Significant Diagnostics:  CT Head Stealth W/O Contrast    Result Date: 1/4/2022  Expected interval operative change status post bifrontal deep brain stimulator device placement.  Small volume postop pneumocephalus overlies the frontal lobes bilaterally. No significant volume acute intracranial hemorrhage or definite sulcal effacement allowing for artifact from the metallic hardware. Clinical correlation and continued follow-up advised. Electronically signed by: Jerzy Moraes DO Date:    01/04/2022 Time:    13:57

## 2022-01-05 NOTE — PT/OT/SLP EVAL
"Occupational Therapy   Evaluation    Name: Elsa Steel  MRN: [id number]  Admitting Diagnosis:  Essential tremor  Recent Surgery: Procedure(s) (LRB):  INSERTION, FIDUCIAL SCREW, SKULL (N/A)  INSERTION, DEEP BRAIN STIMULATOR BILATERAL VIM (Bilateral) 1 Day Post-Op    Recommendations:     Discharge Recommendations: home  Discharge Equipment Recommendations:  none  Barriers to discharge:  None    Assessment:     Elsa Steel is a 71 y.o. female with a medical diagnosis of essential tremors.  She presents with performance deficits affecting function: weakness,impaired endurance,impaired self care skills,gait instability,impaired functional mobilty,impaired balance.      Rehab Prognosis: Good; patient would benefit from acute skilled OT services to address these deficits and reach maximum level of function.       Plan:     Patient to be seen 3 x/week to address the above listed problems via self-care/home management,therapeutic activities,therapeutic exercises,neuromuscular re-education  · Plan of Care Expires:  2/2/22  · Plan of Care Reviewed with: patient    Subjective     Patient: "My skull hurts."     Occupational Profile:  Patient resides in [place] alone in 2 story home with bedroom on the 2nd floor, rail on right.  Patient is right handed.  PTA patient independent with ADLs including driving.  Retired: interior design/landscaping.  Dtg is able to assist upon discharge.    Pain/Comfort:  · Pain Rating 1: 8/10  · Location 1: head  · Pain Addressed 1: Reposition,Nurse notified  · Pain Rating Post-Intervention 1: 8/10    Patients cultural, spiritual, Anglican conflicts given the current situation: no    Objective:     Communicated with: Nurse prior to session.  Patient found supine with peripheral IV,oxygen upon OT entry to room.    General Precautions: Standard, fall   Orthopedic Precautions:N/A   Braces: N/A  Respiratory Status: Room air    Occupational Performance:    Bed Mobility:    · Patient completed " Rolling/Turning to Left with  modified independence  · Patient completed Rolling/Turning to Right with modified independence  · Patient completed Supine to Sit with modified independence  · Patient completed Sit to Supine with modified independence    Functional Mobility/Transfers:  · Patient completed Sit <> Stand Transfer with supervision  with  no assistive device   · Patient completed Toilet Transfer Stand Pivot technique with supervision with  no AD    Activities of Daily Living:  · Grooming: supervision    · Upper Body Dressing: supervision    · Lower Body Dressing: supervision      Cognitive/Visual Perceptual:  Cognitive/Psychosocial Skills:  -       Oriented to: Person, Place, Time and Situation   -       Follows Commands/attention:Follows one-step commands  -       Communication: clear/fluent    Physical Exam:  Postural examination/scapula alignment:    -       Rounded shoulders  Skin integrity: Visible skin intact  Edema:  Moderate left dorsum hand  Upper Extremity Range of Motion:     -       Right Upper Extremity: WNL  -       Left Upper Extremity: WNL  Upper Extremity Strength:    -       Right Upper Extremity: WNL  -       Left Upper Extremity: WNL    AMPAC 6 Click ADL:  AMPAC Total Score: 18    Treatment & Education:  Patient education provided on role of OT.  Patient alert and oriented x 3; able to follow 4/4 one step commands.  Patient attentive and interactive throughout the session.  Continued education, patient/ family training recommended.    OT asked if there were any other questions; patient had no further questions.   Education:    Patient left supine with all lines intact, call button in reach and bed alarm on    GOALS:   Multidisciplinary Problems     Occupational Therapy Goals        Problem: Occupational Therapy Goal    Goal Priority Disciplines Outcome Interventions   Occupational Therapy Goal     OT, PT/OT Ongoing, Progressing    Description: Goals set 1/5 to be addressed for 7 days  with expiration date, 1/12:  Patient will increase functional independence with ADLs by performing:  Patient will demonstrate stand pivot transfers with modified independence.   Not met  Patient will demonstrate grooming while standing with modified independence.   Not met  Patient will demonstrate upper body dressing with modified independence  while seated EOB.   Not met  Patient will demonstrate lower body dressing with modified independence while seated EOB.   Not met  Patient will demonstrate toileting with modified independence.   Not met  Patient will demonstrate bathing while seated EOB with modified independence.   Not met  Patient's family / caregiver will demonstrate independence and safety with assisting patient with self-care skills and functional mobility.     Not met                         History:     Past Medical History:   Diagnosis Date    Dyslipidemia     HTN (hypertension)     Tremor        Past Surgical History:   Procedure Laterality Date    DEEP BRAIN STIMULATOR PLACEMENT Bilateral 1/4/2022    Procedure: INSERTION, DEEP BRAIN STIMULATOR BILATERAL VIM;  Surgeon: Neal Hernández MD;  Location: [address];  Service: Neurosurgery;  Laterality: Bilateral;  BILATERAL (LEFT FIRST, RIGHT SECOND)  INSERTION ELECTRODES FOR DEEP BRAIN STIMULATION IN VENTRALIS INTERMEDIUS/TEDS & SCD/ [employer], ALYSSIA LANDRY. CO SURGERY WITH DR JT MITCHELL.    ESOPHAGOGASTRODUODENOSCOPY N/A 5/21/2021    Procedure: EGD (ESOPHAGOGASTRODUODENOSCOPY);  Surgeon: Juan R Watson MD;  Location: [facility];  Service: Endoscopy;  Laterality: N/A;  covid test 5/18 [place], instr portal -ml    HAND SURGERY      HYSTERECTOMY      PLACEMENT OF FIDUCIAL SCREW INTO SPINE N/A 1/4/2022    Procedure: INSERTION, FIDUCIAL SCREW, SKULL;  Surgeon: Neal Hernández MD;  Location: [address];  Service: Neurosurgery;  Laterality: N/A;  INSERTION FIDUCIAL SCREWS FOR DEEP BRAIN STIMULATION/ TEDS & ACD/ [employer], ALYSSIA LANDRY/ CO  SURGERY WITH DR JT MITCHELL       Time Tracking:     OT Date of Treatment: 01/05/22  OT Start Time: 0626  OT Stop Time: 0642  OT Total Time (min): 16 min    Billable Minutes:Evaluation 8  Therapeutic Activity 8    1/5/2022

## 2022-01-06 ENCOUNTER — HOSPITAL ENCOUNTER (EMERGENCY)
Facility: HOSPITAL | Age: 72
Discharge: HOME | End: 2022-01-06
Attending: EMERGENCY MEDICINE
Payer: MEDICARE

## 2022-01-06 VITALS
RESPIRATION RATE: 18 BRPM | DIASTOLIC BLOOD PRESSURE: 69 MMHG | WEIGHT: 160 LBS | HEART RATE: 75 BPM | TEMPERATURE: 98 F | BODY MASS INDEX: 25.82 KG/M2 | OXYGEN SATURATION: 94 % | SYSTOLIC BLOOD PRESSURE: 145 MMHG

## 2022-01-06 DIAGNOSIS — G89.18 POST-OP PAIN: Primary | ICD-10-CM

## 2022-01-06 PROCEDURE — 99281 EMR DPT VST MAYX REQ PHY/QHP: CPT

## 2022-01-06 PROCEDURE — 99284 EMERGENCY DEPT VISIT MOD MDM: CPT | Mod: ,,, | Performed by: EMERGENCY MEDICINE

## 2022-01-06 PROCEDURE — 99284 PR EMERGENCY DEPT VISIT,LEVEL IV: ICD-10-PCS | Mod: ,,, | Performed by: EMERGENCY MEDICINE

## 2022-01-06 NOTE — ANESTHESIA POSTPROCEDURE EVALUATION
Anesthesia Post Evaluation    Patient: Elsa Steel    Procedure(s) Performed: Procedure(s) (LRB):  INSERTION, FIDUCIAL SCREW, SKULL (N/A)  INSERTION, DEEP BRAIN STIMULATOR BILATERAL VIM (Bilateral)    Final Anesthesia Type: MAC      Patient location during evaluation: PACU  Patient participation: Yes- Able to Participate  Level of consciousness: awake and alert and oriented  Post-procedure vital signs: reviewed and stable  Pain management: adequate  Airway patency: patent    PONV status at discharge: No PONV  Anesthetic complications: no      Cardiovascular status: hemodynamically stable  Respiratory status: unassisted, spontaneous ventilation and room air  Hydration status: euvolemic  Follow-up not needed.          Vitals Value Taken Time   /63 01/05/22 1559   Temp 37.6 °C (99.6 °F) 01/05/22 1559   Pulse 88 01/05/22 1559   Resp 15 01/05/22 1559   SpO2 92 % 01/05/22 1559         Event Time   Out of Recovery 12:15:00         Pain/Kevin Score: Pain Rating Prior to Med Admin: 3 (1/5/2022  3:00 PM)

## 2022-01-07 NOTE — HPI
Pt is a 72yo who underwent bilateral DBS lead palcements last week who presents with worry about newly placed hardware. NSGY consulted for evaluation.

## 2022-01-07 NOTE — ASSESSMENT & PLAN NOTE
Pt is a 70yo who underwent bilateral DBS lead palcements last week who presents with worry about newly placed hardware. NSGY consulted for evaluation.    Plan:  Pt evaluated at bedside, hardware in place without issue and incisions CDI.  Pt afebrile without any other complaints. Reassured that hardware is in normal position and tunneling under skin causes some postoperative swelling  Pt stable for discharge home and to return next week for battery placement. Discussed with Dr Hernández.

## 2022-01-07 NOTE — SUBJECTIVE & OBJECTIVE
(Not in a hospital admission)      Review of patient's allergies indicates:   Allergen Reactions    Iodine and iodide containing products        Past Medical History:   Diagnosis Date    Dyslipidemia     HTN (hypertension)     Tremor      Past Surgical History:   Procedure Laterality Date    DEEP BRAIN STIMULATOR PLACEMENT Bilateral 2022    Procedure: INSERTION, DEEP BRAIN STIMULATOR BILATERAL VIM;  Surgeon: Neal Hernández MD;  Location: [facility] OR [address] [place] [address];  Service: Neurosurgery;  Laterality: Bilateral;  BILATERAL (LEFT FIRST, RIGHT SECOND)  INSERTION ELECTRODES FOR DEEP BRAIN STIMULATION IN VENTRALIS INTERMEDIUS/TEDS & SCD/ MEDTRONICS, ALYSSIA LANDRY. CO SURGERY WITH DR JT MITCHELL.    ESOPHAGOGASTRODUODENOSCOPY N/A 2021    Procedure: EGD (ESOPHAGOGASTRODUODENOSCOPY);  Surgeon: Juan R Watson MD;  Location: [facility];  Service: Endoscopy;  Laterality: N/A;  covid test  [place], instr portal -ml    HAND SURGERY      HYSTERECTOMY      PLACEMENT OF FIDUCIAL SCREW INTO SPINE N/A 2022    Procedure: INSERTION, FIDUCIAL SCREW, SKULL;  Surgeon: Neal Hernández MD;  Location: [facility] OR [address] [place] [address];  Service: Neurosurgery;  Laterality: N/A;  INSERTION FIDUCIAL SCREWS FOR DEEP BRAIN STIMULATION/ TEDS & ACD/ MEDTRONICS, ALYSSIA LANDRY/ CO SURGERY WITH DR JT MITCHELL     Family History     Problem Relation (Age of Onset)    Alzheimer's disease Mother    Cancer Father        Tobacco Use    Smoking status: Former Smoker     Packs/day: 2.00     Years: 30.00     Pack years: 60.00     Start date: 1968     Quit date: 1999     Years since quittin.0    Smokeless tobacco: Never Used   Substance and Sexual Activity    Alcohol use: Not Currently    Drug use: Never    Sexual activity: Not on file     Comment: divorce     Review of Systems  Objective:     Weight: 72.6 kg (160 lb)  Body mass index is 25.82 kg/m².  Vital Signs (Most Recent):  Temp: 98 °F (36.7 °C) (22)  Pulse: 75 (22  2117)  Resp: 18 (01/06/22 2117)  BP: (!) 145/69 (01/06/22 2117)  SpO2: (!) 94 % (01/06/22 2000) Vital Signs (24h Range):  Temp:  [98 °F (36.7 °C)] 98 °F (36.7 °C)  Pulse:  [75-78] 75  Resp:  [18-20] 18  SpO2:  [94 %] 94 %  BP: (136-145)/(69-80) 145/69                          Physical Exam:  Nursing note and vitals reviewed.    Constitutional: She appears well-developed and well-nourished.     Eyes: Pupils are equal, round, and reactive to light. EOM are normal.     Cardiovascular: Normal rate and regular rhythm.     Abdominal: Soft.     Skin: Skin displays no lesions on extremities.     Psych/Behavior: She is alert. She is oriented to person, place, and time.     Musculoskeletal:        Right Upper Extremities: Range of motion is full. Tone is normal.        Left Upper Extremities: Range of motion is full. Tone is normal.       Right Lower Extremities: Range of motion is full. Tone is normal.        Left Lower Extremities: Range of motion is full. Tone is normal.     Neurological:        Coordination: She has normal finger to nose coordination and normal heel to shin coordination.        Sensory: There is no sensory deficit in the trunk. There is no sensory deficit in the extremities.        Cranial nerves: Cranial nerve(s) II, III, IV, V, VI, VII, VIII, IX, X, XI and XII are intact.        Cranial incisions CDI    Significant Labs:  Recent Labs   Lab 01/05/22  0651   *      K 3.8      CO2 24   BUN 9   CREATININE 0.6   CALCIUM 8.8   MG 1.7     Recent Labs   Lab 01/05/22  0651   WBC 14.08*   HGB 13.3   HCT 39.2        No results for input(s): LABPT, INR, APTT in the last 48 hours.  Microbiology Results (last 7 days)     ** No results found for the last 168 hours. **        All pertinent labs from the last 24 hours have been reviewed.    Significant Diagnostics:  I have reviewed all pertinent imaging results/findings within the past 24 hours.  I have reviewed and interpreted all pertinent  imaging results/findings within the past 24 hours.

## 2022-01-07 NOTE — ED TRIAGE NOTES
Pt arrives to ED today with complaints of incision site swelling and drainage. Pt was d/c yesterday after neuro surgery on 1/4. Pt also reports increase in incision site pain.

## 2022-01-07 NOTE — DISCHARGE INSTRUCTIONS
Your post-operative staples and swelling were evaluated by a member of the Neurosurgery team and was found to be normal and expected after your surgery.    If you begin to experience fevers, chills, nausea or vomiting, worsening headaches please seek medical attention. If you see discharge, bleeding or pus from your staples and incision, please seek medical attention and contact your surgeon. Please continue to follow the other information provided by your surgeon after your surgery for post-op care.

## 2022-01-07 NOTE — ED PROVIDER NOTES
Encounter Date: 1/6/2022       History     Chief Complaint   Patient presents with    Post-op Problem     Had neuro surgery 2 days ago. Having swelling and drainage from wound.      Ms. Steel is a pleasant 72 yo F w/ essential tremor recently w/ implanted DBS 2 days ago p/w her daughter for concern about swelling on her scalp. The patient reports that she was discharged yesterday from the hospital after the surgery and felt well. Sometime today, she began to notice a swelling behind her left ear. She says that there is not increased pain, but it seems like her normal post-operative pain. Her daughter says that she doesn't appear the incisions or staples appear any different that when she was discharged yesterday. Patient denies fevers, chills, worsening headaches, SOB, CP, dizziness, seizures, or oozing/bleeding from the staples.     The history is provided by the patient, a relative and medical records.     Review of patient's allergies indicates:   Allergen Reactions    Iodine and iodide containing products      Past Medical History:   Diagnosis Date    Dyslipidemia     HTN (hypertension)     Tremor      Past Surgical History:   Procedure Laterality Date    DEEP BRAIN STIMULATOR PLACEMENT Bilateral 1/4/2022    Procedure: INSERTION, DEEP BRAIN STIMULATOR BILATERAL VIM;  Surgeon: Neal Hernández MD;  Location: [facility] OR [address] [place] [address];  Service: Neurosurgery;  Laterality: Bilateral;  BILATERAL (LEFT FIRST, RIGHT SECOND)  INSERTION ELECTRODES FOR DEEP BRAIN STIMULATION IN VENTRALIS INTERMEDIUS/TEDS & SCD/ MEDTRONICS, ALYSSIA LANDRY. CO SURGERY WITH DR JT MITCHELL.    ESOPHAGOGASTRODUODENOSCOPY N/A 5/21/2021    Procedure: EGD (ESOPHAGOGASTRODUODENOSCOPY);  Surgeon: Juan R Watson MD;  Location: [facility];  Service: Endoscopy;  Laterality: N/A;  covid test 5/18 [place], instr portal -ml    HAND SURGERY      HYSTERECTOMY      PLACEMENT OF FIDUCIAL SCREW INTO SPINE N/A 1/4/2022    Procedure: INSERTION, FIDUCIAL SCREW,  SKULL;  Surgeon: Neal Hernández MD;  Location: [facility] OR [address] [place] [address];  Service: Neurosurgery;  Laterality: N/A;  INSERTION FIDUCIAL SCREWS FOR DEEP BRAIN STIMULATION/ TEDS & ACD/ MEDTRONICS, ALYSSIA LANDRY/ CO SURGERY WITH DR JT MITCHELL     Family History   Problem Relation Age of Onset    Alzheimer's disease Mother     Cancer Father      Social History     Tobacco Use    Smoking status: Former Smoker     Packs/day: 2.00     Years: 30.00     Pack years: 60.00     Start date: 1968     Quit date: 1999     Years since quittin.0    Smokeless tobacco: Never Used   Substance Use Topics    Alcohol use: Not Currently    Drug use: Never     Review of Systems   Constitutional: Negative for chills and fever.   HENT: Negative for sinus pressure, sinus pain, sneezing and sore throat.    Eyes: Negative for pain and visual disturbance.   Respiratory: Negative for choking and shortness of breath.    Cardiovascular: Negative for chest pain and palpitations.   Gastrointestinal: Negative for abdominal pain.   Skin: Negative for rash and wound.   Neurological: Negative for dizziness, seizures, syncope and weakness.       Physical Exam     Initial Vitals [22]   BP Pulse Resp Temp SpO2   136/80 78 20 98 °F (36.7 °C) (!) 94 %      MAP       --         Physical Exam    Nursing note and vitals reviewed.  Constitutional: She appears well-developed and well-nourished. She is not diaphoretic. She is cooperative. She does not appear ill. No distress.   HENT:   Head: Normocephalic.       Soft swelling at site noted behind the L ear w/o erythema (see graphic). Tender w/ palpation.  Patient w/ postoperative incision clean, dry, intact w/ staples. No oozing or bleeding from sites noted.    Eyes: EOM are normal. Pupils are equal, round, and reactive to light. Right eye exhibits no discharge. Left eye exhibits no discharge. No scleral icterus.   Cardiovascular: Normal rate and regular rhythm.   Pulmonary/Chest: Breath sounds  normal. No respiratory distress. She exhibits no tenderness.   Abdominal: Abdomen is soft. She exhibits no distension. There is no abdominal tenderness.     Neurological: She is alert and oriented to person, place, and time. She has normal strength. GCS score is 15. GCS eye subscore is 4. GCS verbal subscore is 5. GCS motor subscore is 6.   --Sedation: none  --GCS: E4V5M6  --Mental Status: alert, oriented  --CN II-XII grossly unable to examine accurately except as described  --Pupils 3mm, PERRL.   --Brainstem: intact  --Motor: moves all extremities spontaneously, 5/5 strength bilaterally in all extremities  --Deep Tendon Reflexes: not tested  --Gait: deferred     Skin: Skin is warm and dry.   Psychiatric: She has a normal mood and affect. Her behavior is normal.         ED Course   Procedures  Labs Reviewed   HEPATITIS C ANTIBODY          Imaging Results    None          Medications - No data to display  Medical Decision Making:   History:   Old Records Summarized: records from previous admission(s).       <> Summary of Records: Operative records reviewed from 1/4  Initial Assessment:   72 yo F w/ essential tremor s/p DBS insertion 2 days ago p/w concern of swelling post-operatively. Afebrile, VSS. Incisions c/d/i. Soft post-auricular swelling not near incisions w/o erythema, warmth or bleeding, mildly tender to palpation.   Differential Diagnosis:   DDx including but not limited to: Expected post-operative changes vs. Abscess vs. Hematoma  ED Management:  NSGY resident on call paged and evaluated patient  Discussed the case w/ the resident: The swelling is from the leads of the DBS and is expected post-operative changes. No concern for infection or complication needing hospitalization or imaging. Pt will continue to follow-up with NSGY in the clinic.   Patient discharged in stable condition with her daughter.             Attending Attestation:   Physician Attestation Statement for Resident:  As the supervising MD    Physician Attestation Statement: I have personally seen and examined this patient.   I agree with the above history. -: 72 yo W with pmhx essential tremor presents POD#1 DBS with scalp swelling.  Patient reports swelling in the left temporal scalp.  It is not underlying hand incision.  That is somewhat tender to palpation.  No warmth or erythema.  Neurosurgery consulted who evaluated patient and feel this is consistent with localized swelling at site of the leads.  They recommend follow-up in clinic in 1 week.  Return precautions.   As the supervising MD I agree with the above PE.    As the supervising MD I agree with the above treatment, course, plan, and disposition.                         Clinical Impression:   Final diagnoses:  [G89.18] Post-op pain (Primary)          ED Disposition Condition    Discharge Stable        ED Prescriptions     None        Follow-up Information     Follow up With Specialties Details Why Contact Info Additional Information    [facility] - Emergency Dept Emergency Medicine  As needed, If symptoms worsen [id number]  [facility] [id number] [phone]     [facility] - Neurosurgery [facility] Neurosurgery  Follow-up with Neurosurgery at your scheduled appointment [id number]  [facility] [id number] [phone] [address]th Floor           Bryson Herrera DO  Resident  01/06/22 7051

## 2022-01-07 NOTE — CONSULTS
[facility] - Emergency Dept  Neurosurgery  Consult Note    Consults  Subjective:     Chief Complaint/Reason for Admission: post op hardware feeling    History of Present Illness: Pt is a 72yo who underwent bilateral DBS lead palcements last week who presents with worry about newly placed hardware. NSGY consulted for evaluation.      (Not in a hospital admission)      Review of patient's allergies indicates:   Allergen Reactions    Iodine and iodide containing products        Past Medical History:   Diagnosis Date    Dyslipidemia     HTN (hypertension)     Tremor      Past Surgical History:   Procedure Laterality Date    DEEP BRAIN STIMULATOR PLACEMENT Bilateral 2022    Procedure: INSERTION, DEEP BRAIN STIMULATOR BILATERAL VIM;  Surgeon: Neal Hernández MD;  Location: [facility] OR [address] [place] [address];  Service: Neurosurgery;  Laterality: Bilateral;  BILATERAL (LEFT FIRST, RIGHT SECOND)  INSERTION ELECTRODES FOR DEEP BRAIN STIMULATION IN VENTRALIS INTERMEDIUS/TEDS & SCD/ MEDTRONICS, ALYSSIA LANDRY. CO SURGERY WITH DR JT MITCHELL.    ESOPHAGOGASTRODUODENOSCOPY N/A 2021    Procedure: EGD (ESOPHAGOGASTRODUODENOSCOPY);  Surgeon: Juan R Watson MD;  Location: [facility];  Service: Endoscopy;  Laterality: N/A;  covid test  [place], instr portal -ml    HAND SURGERY      HYSTERECTOMY      PLACEMENT OF FIDUCIAL SCREW INTO SPINE N/A 2022    Procedure: INSERTION, FIDUCIAL SCREW, SKULL;  Surgeon: Neal Hernández MD;  Location: [address];  Service: Neurosurgery;  Laterality: N/A;  INSERTION FIDUCIAL SCREWS FOR DEEP BRAIN STIMULATION/ TEDS & ACD/ MEDYAMILETHS, ALYSSIA LANDRY/ CO SURGERY WITH DR JT MITCHELL     Family History     Problem Relation (Age of Onset)    Alzheimer's disease Mother    Cancer Father        Tobacco Use    Smoking status: Former Smoker     Packs/day: 2.00     Years: 30.00     Pack years: 60.00     Start date: 1968     Quit date: 1999     Years since quittin.0    Smokeless tobacco: Never  Used   Substance and Sexual Activity    Alcohol use: Not Currently    Drug use: Never    Sexual activity: Not on file     Comment: divorce     Review of Systems  Objective:     Weight: 72.6 kg (160 lb)  Body mass index is 25.82 kg/m².  Vital Signs (Most Recent):  Temp: 98 °F (36.7 °C) (01/06/22 2000)  Pulse: 75 (01/06/22 2117)  Resp: 18 (01/06/22 2117)  BP: (!) 145/69 (01/06/22 2117)  SpO2: (!) 94 % (01/06/22 2000) Vital Signs (24h Range):  Temp:  [98 °F (36.7 °C)] 98 °F (36.7 °C)  Pulse:  [75-78] 75  Resp:  [18-20] 18  SpO2:  [94 %] 94 %  BP: (136-145)/(69-80) 145/69                          Physical Exam:  Nursing note and vitals reviewed.    Constitutional: She appears well-developed and well-nourished.     Eyes: Pupils are equal, round, and reactive to light. EOM are normal.     Cardiovascular: Normal rate and regular rhythm.     Abdominal: Soft.     Skin: Skin displays no lesions on extremities.     Psych/Behavior: She is alert. She is oriented to person, place, and time.     Musculoskeletal:        Right Upper Extremities: Range of motion is full. Tone is normal.        Left Upper Extremities: Range of motion is full. Tone is normal.       Right Lower Extremities: Range of motion is full. Tone is normal.        Left Lower Extremities: Range of motion is full. Tone is normal.     Neurological:        Coordination: She has normal finger to nose coordination and normal heel to shin coordination.        Sensory: There is no sensory deficit in the trunk. There is no sensory deficit in the extremities.        Cranial nerves: Cranial nerve(s) II, III, IV, V, VI, VII, VIII, IX, X, XI and XII are intact.        Cranial incisions CDI    Significant Labs:  Recent Labs   Lab 01/05/22  0651   *      K 3.8      CO2 24   BUN 9   CREATININE 0.6   CALCIUM 8.8   MG 1.7     Recent Labs   Lab 01/05/22  0651   WBC 14.08*   HGB 13.3   HCT 39.2        No results for input(s): LABPT, INR, APTT in the last  48 hours.  Microbiology Results (last 7 days)     ** No results found for the last 168 hours. **        All pertinent labs from the last 24 hours have been reviewed.    Significant Diagnostics:  I have reviewed all pertinent imaging results/findings within the past 24 hours.  I have reviewed and interpreted all pertinent imaging results/findings within the past 24 hours.    Assessment/Plan:     Essential tremor  Pt is a 72yo who underwent bilateral DBS lead palcements last week who presents with worry about newly placed hardware. NSGY consulted for evaluation.    Plan:  Pt evaluated at bedside, hardware in place without issue and incisions CDI.  Pt afebrile without any other complaints. Reassured that hardware is in normal position and tunneling under skin causes some postoperative swelling  Pt stable for discharge home and to return next week for battery placement. Discussed with Dr Hernández.        Thank you for your consult. I will follow-up with patient. Please contact us if you have any additional questions.    Gold Fleming MD  Neurosurgery  [facility] - Emergency Dept

## 2022-01-10 NOTE — DISCHARGE SUMMARY
[facility]surgery ([facility])  Neurosurgery  Discharge Summary      Patient Name: Elsa Steel  MRN: [id number]  Admission Date: 1/4/2022  Hospital Length of Stay: 1 days  Discharge Date and Time: 1/5/2022  5:34 PM  Attending Physician: Robina Barney MD  Discharging Provider: Dee Napoles PA-C  Primary Care Provider: David Olson MD    HPI:   Elsa Steel was seen in neurosurgical consultation at the office this morning.  She is a 71-year-old lady who began to note tremor of the upper extremities in early 30s.  Her mother and grandfather apparently had tremor and her son is beginning to show tremor also.  Tremor has progressed to where she is having difficulty using her arms and hands effectively.  She notes if something is heavy in her hand tremor is pretty well controlled but with something light like a fork or makeup that tremor is severe.  She has begun to note some tremor of her head and voice also.  She has had no specific rigidity or difficulty initiating movement.  She has had no difficulty with gait or balance.  She has been treated with propanolol and Mysoline with only modest benefit.  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  Past medical history includes hypertension treated with Norvasc.  She otherwise has no specific medical issues.  She has been followed in the [facility] with Dr. Bennett and has had neurosurgical consultation with Dr. Barney.  She has also had neuropsychological evaluation by Dr. Iraheta.      Procedure(s) (LRB):  INSERTION, FIDUCIAL SCREW, SKULL (N/A)  INSERTION, DEEP BRAIN STIMULATOR BILATERAL VIM (Bilateral)     Hospital Course: 1/5: Nausea/vomiting overnight. Patient reports headache this AM. CTH with expected post op changes, post op pneumocephalus. Patient placed on non rebreather for pneumocephalus. Scopalmine patch added for nausea. Home pending po intake and improvement  in nausea.       Goals of Care Treatment Preferences:  Code Status: Full Code      Consults:   Consults (From admission, onward)        Status Ordering Provider     Inpatient consult to Midline team  Once        Provider:  (Not yet assigned)    TARYN Angela          Significant Diagnostic Studies: BMP, Mg, Phos, CBC, T&S, PTT, INR    CTH    Pending Diagnostic Studies:     None        Final Active Diagnoses:    Diagnosis Date Noted POA    PRINCIPAL PROBLEM:  Essential tremor [G25.0] 06/21/2021 Yes      Problems Resolved During this Admission:      Discharged Condition: good     Disposition: Home or Self Care    Follow Up: one week for battery implantation    Patient Instructions:      Notify your health care provider if you experience any of the following:  temperature >100.4     Notify your health care provider if you experience any of the following:  persistent nausea and vomiting or diarrhea     Notify your health care provider if you experience any of the following:  severe uncontrolled pain     Notify your health care provider if you experience any of the following:  redness, tenderness, or signs of infection (pain, swelling, redness, odor or green/yellow discharge around incision site)     Notify your health care provider if you experience any of the following:  difficulty breathing or increased cough     Notify your health care provider if you experience any of the following:  severe persistent headache     Notify your health care provider if you experience any of the following:  worsening rash     Notify your health care provider if you experience any of the following:  persistent dizziness, light-headedness, or visual disturbances     Notify your health care provider if you experience any of the following:  increased confusion or weakness     Activity as tolerated     Medications:  Reconciled Home Medications:      Medication List      START taking these medications    acetaminophen 325 MG  tablet  Commonly known as: TYLENOL  Take 2 tablets (650 mg total) by mouth every 6 (six) hours as needed.     HYDROcodone-acetaminophen 5-325 mg per tablet  Commonly known as: NORCO  Take 1 tablet by mouth every 6 (six) hours as needed for Pain.     ondansetron 8 MG Tbdl  Commonly known as: ZOFRAN-ODT  Dissolve 1 tablet (8 mg total) by mouth every 6 (six) hours as needed (nausea).     scopolamine 1.3-1.5 mg (1 mg over 3 days)  Commonly known as: TRANSDERM-SCOP  Place 1 patch onto the skin Every 3 (three) days.        CONTINUE taking these medications    amLODIPine 5 MG tablet  Commonly known as: NORVASC  Take 5 mg by mouth once daily.     gabapentin 300 MG capsule  Commonly known as: NEURONTIN  Take 300 mg by mouth 3 (three) times daily.     primidone 50 MG Tab  Commonly known as: MYSOLINE  Take 2 tablets (100 mg total) by mouth 3 (three) times daily.     sertraline 100 MG tablet  Commonly known as: ZOLOFT  Take 100 mg by mouth once daily.            Dee Napoles PA-C  Neurosurgery  [facility] - Neurosurgery [facility])

## 2022-01-11 ENCOUNTER — ANESTHESIA (OUTPATIENT)
Dept: SURGERY | Facility: HOSPITAL | Age: 72
End: 2022-01-11
Payer: MEDICARE

## 2022-01-11 ENCOUNTER — ANESTHESIA EVENT (OUTPATIENT)
Dept: SURGERY | Facility: HOSPITAL | Age: 72
End: 2022-01-11
Payer: MEDICARE

## 2022-01-11 ENCOUNTER — HOSPITAL ENCOUNTER (OUTPATIENT)
Facility: HOSPITAL | Age: 72
Discharge: HOME | End: 2022-01-11
Attending: NEUROLOGICAL SURGERY | Admitting: NEUROLOGICAL SURGERY
Payer: MEDICARE

## 2022-01-11 VITALS
TEMPERATURE: 98 F | WEIGHT: 160 LBS | RESPIRATION RATE: 11 BRPM | SYSTOLIC BLOOD PRESSURE: 157 MMHG | HEART RATE: 90 BPM | BODY MASS INDEX: 25.71 KG/M2 | DIASTOLIC BLOOD PRESSURE: 74 MMHG | HEIGHT: 66 IN | OXYGEN SATURATION: 95 %

## 2022-01-11 DIAGNOSIS — G25.0 ESSENTIAL TREMOR: ICD-10-CM

## 2022-01-11 LAB
CTP QC/QA: YES
SARS-COV-2 AG RESP QL IA.RAPID: NEGATIVE

## 2022-01-11 PROCEDURE — D9220A PRA ANESTHESIA: Mod: CRNA,,, | Performed by: NURSE ANESTHETIST, CERTIFIED REGISTERED

## 2022-01-11 PROCEDURE — C1883 ADAPT/EXT, PACING/NEURO LEAD: HCPCS | Performed by: NEUROLOGICAL SURGERY

## 2022-01-11 PROCEDURE — 63600175 PHARM REV CODE 636 W HCPCS: Performed by: NEUROLOGICAL SURGERY

## 2022-01-11 PROCEDURE — C1787 PATIENT PROGR, NEUROSTIM: HCPCS | Performed by: NEUROLOGICAL SURGERY

## 2022-01-11 PROCEDURE — 61886 IMPLANT NEUROSTIM ARRAYS: CPT | Mod: 58,,, | Performed by: NEUROLOGICAL SURGERY

## 2022-01-11 PROCEDURE — 71000045 HC DOSC ROUTINE RECOVERY EA ADD'L HR: Performed by: NEUROLOGICAL SURGERY

## 2022-01-11 PROCEDURE — 25000003 PHARM REV CODE 250: Performed by: NEUROLOGICAL SURGERY

## 2022-01-11 PROCEDURE — 25000003 PHARM REV CODE 250: Performed by: NURSE ANESTHETIST, CERTIFIED REGISTERED

## 2022-01-11 PROCEDURE — D9220A PRA ANESTHESIA: ICD-10-PCS | Mod: CRNA,,, | Performed by: NURSE ANESTHETIST, CERTIFIED REGISTERED

## 2022-01-11 PROCEDURE — 63600175 PHARM REV CODE 636 W HCPCS: Performed by: NURSE ANESTHETIST, CERTIFIED REGISTERED

## 2022-01-11 PROCEDURE — D9220A PRA ANESTHESIA: ICD-10-PCS | Mod: ANES,,, | Performed by: ANESTHESIOLOGY

## 2022-01-11 PROCEDURE — 27201423 OPTIME MED/SURG SUP & DEVICES STERILE SUPPLY: Performed by: NEUROLOGICAL SURGERY

## 2022-01-11 PROCEDURE — 61886 PR IMP STIM,CRANIAL,SUBQ,>1 ARRAY: ICD-10-PCS | Mod: 58,,, | Performed by: NEUROLOGICAL SURGERY

## 2022-01-11 PROCEDURE — 63600175 PHARM REV CODE 636 W HCPCS

## 2022-01-11 PROCEDURE — 36000711: Performed by: NEUROLOGICAL SURGERY

## 2022-01-11 PROCEDURE — 71000044 HC DOSC ROUTINE RECOVERY FIRST HOUR: Performed by: NEUROLOGICAL SURGERY

## 2022-01-11 PROCEDURE — 63600175 PHARM REV CODE 636 W HCPCS: Performed by: ANESTHESIOLOGY

## 2022-01-11 PROCEDURE — C1767 GENERATOR, NEURO NON-RECHARG: HCPCS | Performed by: NEUROLOGICAL SURGERY

## 2022-01-11 PROCEDURE — 37000009 HC ANESTHESIA EA ADD 15 MINS: Performed by: NEUROLOGICAL SURGERY

## 2022-01-11 PROCEDURE — 37000008 HC ANESTHESIA 1ST 15 MINUTES: Performed by: NEUROLOGICAL SURGERY

## 2022-01-11 PROCEDURE — D9220A PRA ANESTHESIA: Mod: ANES,,, | Performed by: ANESTHESIOLOGY

## 2022-01-11 PROCEDURE — 36000710: Performed by: NEUROLOGICAL SURGERY

## 2022-01-11 PROCEDURE — 71000015 HC POSTOP RECOV 1ST HR: Performed by: NEUROLOGICAL SURGERY

## 2022-01-11 DEVICE — NEUROSTIMULATR PERCEPT 58X51MM: Type: IMPLANTABLE DEVICE | Site: CHEST | Status: FUNCTIONAL

## 2022-01-11 DEVICE — IMPLANTABLE DEVICE: Type: IMPLANTABLE DEVICE | Site: NECK | Status: FUNCTIONAL

## 2022-01-11 RX ORDER — DEXAMETHASONE SODIUM PHOSPHATE 4 MG/ML
INJECTION, SOLUTION INTRA-ARTICULAR; INTRALESIONAL; INTRAMUSCULAR; INTRAVENOUS; SOFT TISSUE
Status: DISCONTINUED | OUTPATIENT
Start: 2022-01-11 | End: 2022-01-11

## 2022-01-11 RX ORDER — HYDROCODONE BITARTRATE AND ACETAMINOPHEN 5; 325 MG/1; MG/1
1 TABLET ORAL EVERY 4 HOURS PRN
Qty: 20 TABLET | Refills: 0 | Status: SHIPPED | OUTPATIENT
Start: 2022-01-11 | End: 2022-01-17

## 2022-01-11 RX ORDER — HYDROMORPHONE HYDROCHLORIDE 1 MG/ML
0.2 INJECTION, SOLUTION INTRAMUSCULAR; INTRAVENOUS; SUBCUTANEOUS EVERY 5 MIN PRN
Status: DISCONTINUED | OUTPATIENT
Start: 2022-01-11 | End: 2022-01-11 | Stop reason: HOSPADM

## 2022-01-11 RX ORDER — NEOSTIGMINE METHYLSULFATE 0.5 MG/ML
INJECTION, SOLUTION INTRAVENOUS
Status: DISCONTINUED | OUTPATIENT
Start: 2022-01-11 | End: 2022-01-11

## 2022-01-11 RX ORDER — ONDANSETRON 2 MG/ML
INJECTION INTRAMUSCULAR; INTRAVENOUS
Status: DISCONTINUED | OUTPATIENT
Start: 2022-01-11 | End: 2022-01-11

## 2022-01-11 RX ORDER — LIDOCAINE HYDROCHLORIDE AND EPINEPHRINE 10; 10 MG/ML; UG/ML
INJECTION, SOLUTION INFILTRATION; PERINEURAL
Status: DISCONTINUED | OUTPATIENT
Start: 2022-01-11 | End: 2022-01-11 | Stop reason: HOSPADM

## 2022-01-11 RX ORDER — MEPERIDINE HYDROCHLORIDE 50 MG/ML
12.5 INJECTION INTRAMUSCULAR; INTRAVENOUS; SUBCUTANEOUS ONCE AS NEEDED
Status: DISCONTINUED | OUTPATIENT
Start: 2022-01-11 | End: 2022-01-11 | Stop reason: HOSPADM

## 2022-01-11 RX ORDER — SODIUM CHLORIDE 9 MG/ML
INJECTION, SOLUTION INTRAVENOUS CONTINUOUS
Status: DISCONTINUED | OUTPATIENT
Start: 2022-01-11 | End: 2022-01-11 | Stop reason: HOSPADM

## 2022-01-11 RX ORDER — CEFTRIAXONE 1 G/1
INJECTION, POWDER, FOR SOLUTION INTRAMUSCULAR; INTRAVENOUS
Status: DISCONTINUED | OUTPATIENT
Start: 2022-01-11 | End: 2022-01-11 | Stop reason: HOSPADM

## 2022-01-11 RX ORDER — FENTANYL CITRATE 50 UG/ML
INJECTION, SOLUTION INTRAMUSCULAR; INTRAVENOUS
Status: DISCONTINUED | OUTPATIENT
Start: 2022-01-11 | End: 2022-01-11

## 2022-01-11 RX ORDER — ROCURONIUM BROMIDE 10 MG/ML
INJECTION, SOLUTION INTRAVENOUS
Status: DISCONTINUED | OUTPATIENT
Start: 2022-01-11 | End: 2022-01-11

## 2022-01-11 RX ORDER — MUPIROCIN 20 MG/G
OINTMENT TOPICAL
Status: DISCONTINUED | OUTPATIENT
Start: 2022-01-11 | End: 2022-01-11 | Stop reason: HOSPADM

## 2022-01-11 RX ORDER — MIDAZOLAM HYDROCHLORIDE 1 MG/ML
INJECTION, SOLUTION INTRAMUSCULAR; INTRAVENOUS
Status: DISCONTINUED | OUTPATIENT
Start: 2022-01-11 | End: 2022-01-11

## 2022-01-11 RX ORDER — PROPOFOL 10 MG/ML
VIAL (ML) INTRAVENOUS
Status: DISCONTINUED | OUTPATIENT
Start: 2022-01-11 | End: 2022-01-11

## 2022-01-11 RX ORDER — MUPIROCIN 20 MG/G
1 OINTMENT TOPICAL 2 TIMES DAILY
Status: DISCONTINUED | OUTPATIENT
Start: 2022-01-11 | End: 2022-01-11 | Stop reason: HOSPADM

## 2022-01-11 RX ORDER — LORAZEPAM 2 MG/ML
0.25 INJECTION INTRAMUSCULAR ONCE AS NEEDED
Status: DISCONTINUED | OUTPATIENT
Start: 2022-01-11 | End: 2022-01-11 | Stop reason: HOSPADM

## 2022-01-11 RX ORDER — HYDROMORPHONE HYDROCHLORIDE 1 MG/ML
INJECTION, SOLUTION INTRAMUSCULAR; INTRAVENOUS; SUBCUTANEOUS
Status: COMPLETED
Start: 2022-01-11 | End: 2022-01-11

## 2022-01-11 RX ADMIN — HYDROMORPHONE HYDROCHLORIDE 0.2 MG: 1 INJECTION, SOLUTION INTRAMUSCULAR; INTRAVENOUS; SUBCUTANEOUS at 03:01

## 2022-01-11 RX ADMIN — ROCURONIUM BROMIDE 35 MG: 10 INJECTION, SOLUTION INTRAVENOUS at 01:01

## 2022-01-11 RX ADMIN — PROPOFOL 150 MG: 10 INJECTION, EMULSION INTRAVENOUS at 01:01

## 2022-01-11 RX ADMIN — SODIUM CHLORIDE: 0.9 INJECTION, SOLUTION INTRAVENOUS at 01:01

## 2022-01-11 RX ADMIN — NEOSTIGMINE METHYLSULFATE 5 MG: 0.5 INJECTION INTRAVENOUS at 02:01

## 2022-01-11 RX ADMIN — FENTANYL CITRATE 100 MCG: 50 INJECTION, SOLUTION INTRAMUSCULAR; INTRAVENOUS at 01:01

## 2022-01-11 RX ADMIN — MIDAZOLAM HYDROCHLORIDE 2 MG: 1 INJECTION, SOLUTION INTRAMUSCULAR; INTRAVENOUS at 01:01

## 2022-01-11 RX ADMIN — SODIUM CHLORIDE, SODIUM GLUCONATE, SODIUM ACETATE, POTASSIUM CHLORIDE, MAGNESIUM CHLORIDE, SODIUM PHOSPHATE, DIBASIC, AND POTASSIUM PHOSPHATE: .53; .5; .37; .037; .03; .012; .00082 INJECTION, SOLUTION INTRAVENOUS at 02:01

## 2022-01-11 RX ADMIN — CEFTRIAXONE 2 G: 1 INJECTION, POWDER, FOR SOLUTION INTRAMUSCULAR; INTRAVENOUS at 01:01

## 2022-01-11 RX ADMIN — HYDROMORPHONE HYDROCHLORIDE 0.2 MG: 1 INJECTION, SOLUTION INTRAMUSCULAR; INTRAVENOUS; SUBCUTANEOUS at 04:01

## 2022-01-11 RX ADMIN — DEXAMETHASONE SODIUM PHOSPHATE 4 MG: 4 INJECTION INTRA-ARTICULAR; INTRALESIONAL; INTRAMUSCULAR; INTRAVENOUS; SOFT TISSUE at 02:01

## 2022-01-11 RX ADMIN — PROPOFOL 30 MG: 10 INJECTION, EMULSION INTRAVENOUS at 02:01

## 2022-01-11 RX ADMIN — ROCURONIUM BROMIDE 15 MG: 10 INJECTION, SOLUTION INTRAVENOUS at 02:01

## 2022-01-11 RX ADMIN — ONDANSETRON 4 MG: 2 INJECTION INTRAMUSCULAR; INTRAVENOUS at 02:01

## 2022-01-11 RX ADMIN — CEFTRIAXONE 2 G: 2 INJECTION, SOLUTION INTRAVENOUS at 05:01

## 2022-01-11 RX ADMIN — GLYCOPYRROLATE 0.6 MG: 0.2 INJECTION INTRAMUSCULAR; INTRAVENOUS at 02:01

## 2022-01-11 NOTE — ANESTHESIA PREPROCEDURE EVALUATION
01/11/2022  Elsa Steel is a 71 y.o., female.    Anesthesia Evaluation    I have reviewed the Patient Summary Reports.    I have reviewed the Nursing Notes.       Review of Systems  Anesthesia Hx:  No problems with previous Anesthesia    Hematology/Oncology:  Hematology Normal   Oncology Normal     EENT/Dental:EENT/Dental Normal   Cardiovascular:   Hypertension CHF: Tremors.    Pulmonary:   Shortness of breath    Renal/:  Renal/ Normal     Hepatic/GI:  Hepatic/GI Normal    Musculoskeletal:  Musculoskeletal Normal    Neurological:  Neurology Normal tremors   Endocrine:  Endocrine Normal    Dermatological:  Skin Normal    Psych:   Psychiatric History          Physical Exam  General:  Well nourished    Airway/Jaw/Neck:  Airway Findings: Mouth Opening: Normal Tongue: Normal  General Airway Assessment: Adult  Mallampati: II  TM Distance: Normal, at least 6 cm        Eyes/Ears/Nose:  EYES/EARS/NOSE FINDINGS: Normal    Chest/Lungs:  Chest/Lungs Clear    Heart/Vascular:  Heart Findings: Normal Heart murmur: negative Vascular Findings: Normal    Abdomen:  Abdomen Findings: Normal    Musculoskeletal:  Musculoskeletal Findings: Normal   Skin:  Skin Findings: Normal    Mental Status:  Mental Status Findings: Normal        Anesthesia Plan  Type of Anesthesia, risks & benefits discussed:  Anesthesia Type:  general    Patient's Preference:   Plan Factors:          Intra-op Monitoring Plan:   Intra-op Monitoring Plan Comments:   Post Op Pain Control Plan:   Post Op Pain Control Plan Comments:     Induction:   IV  Beta Blocker:  Patient is not currently on a Beta-Blocker (No further documentation required).       Informed Consent: Patient understands risks and agrees with Anesthesia plan.  Questions answered. Anesthesia consent signed with patient.  ASA Score: 3     Day of Surgery Review of History & Physical:    H&P  update referred to the surgeon.         Ready For Surgery From Anesthesia Perspective.

## 2022-01-11 NOTE — DISCHARGE INSTRUCTIONS
Discharge Note     OUTCOME: Patient tolerated treatment/procedure well without complication and is now ready for discharge.     DISPOSITION: Home or Self Care     FINAL DIAGNOSIS:  Essential tremor       FOLLOWUP: In clinic     DISCHARGE INSTRUCTIONS   Wound care:     Keep incisions dry. Do not soak under water (bathtub, swimming pool, etc.). Please shower with baby shampoo, but do not take a bath. If the incision becomes wet, gently pat it dry with a clean towel; do not rub.        Remove outer dressing after 48 hours. There are Steri-Strips (butterfly bandages) underneath. Keep clean and dry for 14 days (cover with saran wrap while showering). It is OK if the Steri-Strips fall off on their own before then, but please do not remove them yourself. If they are still on after 14 days, you may gently remove them in the shower. Do not place any ointment over the Steri-Strips.     Other post-op instructions:     No lifting anything heavier than a gallon of milk until cleared in post-operative visit.     No driving until cleared in your post-operative appointment. No driving while on narcotics.        You now have an implanted device in place. It is imperative that any infection (such as a urinary tract infection) be treated immediately so that it cannot get into your bloodstream. If an infection ends up in your blood, it may infect the device, thus requiring us to remove it.

## 2022-01-11 NOTE — TRANSFER OF CARE
"Anesthesia Transfer of Care Note    Patient: Elsa Steel    Procedure(s) Performed: Procedure(s) (LRB):  INSERTION, PULSE GENERATOR, DEEP BRAIN STIMULATOR (Left)    Patient location: PACU    Anesthesia Type: general    Transport from OR: Transported from OR on 6-10 L/min O2 by face mask with adequate spontaneous ventilation    Post pain: adequate analgesia    Post assessment: no apparent anesthetic complications    Post vital signs: stable    Level of consciousness: awake and alert    Nausea/Vomiting: no nausea/vomiting    Complications: none    Transfer of care protocol was followed      Last vitals:   Visit Vitals  BP (!) 168/82 (BP Location: Left arm, Patient Position: Lying)   Pulse 71   Temp 36.7 °C (98.1 °F) (Oral)   Resp 18   Ht 5' 6" (1.676 m)   Wt 72.6 kg (160 lb)   SpO2 96%   Breastfeeding No   BMI 25.82 kg/m²     "

## 2022-01-11 NOTE — ANESTHESIA POSTPROCEDURE EVALUATION
Anesthesia Post Evaluation    Patient: Elsa Steel    Procedure(s) Performed: Procedure(s) (LRB):  INSERTION, PULSE GENERATOR, DEEP BRAIN STIMULATOR (Left)    Final Anesthesia Type: general      Patient location during evaluation: PACU  Patient participation: Yes- Able to Participate  Level of consciousness: awake and alert  Post-procedure vital signs: reviewed and stable  Pain management: adequate  Airway patency: patent    PONV status at discharge: No PONV  Anesthetic complications: no      Cardiovascular status: blood pressure returned to baseline  Respiratory status: spontaneous ventilation and room air  Hydration status: euvolemic  Follow-up not needed.          Vitals Value Taken Time   /78 01/11/22 1516   Temp 36.9 °C (98.4 °F) 01/11/22 1503   Pulse 77 01/11/22 1521   Resp 22 01/11/22 1521   SpO2 98 % 01/11/22 1521   Vitals shown include unvalidated device data.      No case tracking events are documented in the log.      Pain/Kevin Score: Kevin Score: 9 (1/11/2022  3:03 PM)

## 2022-01-11 NOTE — PLAN OF CARE
Paged MD Cooper per patient complaint of hearing loss in L ear. MD states he will come assess patient.

## 2022-01-11 NOTE — BRIEF OP NOTE
[facility] - Surgery ([facility])  Brief Operative Note    Surgery Date: 1/11/2022     Surgeon(s) and Role:     * Neal Hernández MD - Primary     * Jose Cooper MD - Resident - Assisting    Pre-op Diagnosis:  Essential tremor [G25.0]    Post-op Diagnosis:  Post-Op Diagnosis Codes:     * Essential tremor [G25.0]    Procedure(s) (LRB):  INSERTION, PULSE GENERATOR, DEEP BRAIN STIMULATOR (Left)    Anesthesia: General    Operative Findings: good battery  Estimated Blood Loss: 5 mL         Specimens:   Specimen (24h ago, onward)            None            Discharge Note    OUTCOME: Patient tolerated treatment/procedure well without complication and is now ready for discharge.    DISPOSITION: Home or Self Care    FINAL DIAGNOSIS:  Essential tremor      FOLLOWUP: In clinic    DISCHARGE INSTRUCTIONS   Wound care:    Keep incisions dry. Do not soak under water (bathtub, swimming pool, etc.). Please shower with baby shampoo, but do not take a bath. If the incision becomes wet, gently pat it dry with a clean towel; do not rub.       Remove outer dressing after 48 hours. There are Steri-Strips (butterfly bandages) underneath. Keep clean and dry for 14 days (cover with saran wrap while showering). It is OK if the Steri-Strips fall off on their own before then, but please do not remove them yourself. If they are still on after 14 days, you may gently remove them in the shower. Do not place any ointment over the Steri-Strips.     Other post-op instructions:    No lifting anything heavier than a gallon of milk until cleared in post-operative visit.     No driving until cleared in your post-operative appointment. No driving while on narcotics.      You now have an implanted device in place. It is imperative that any infection (such as a urinary tract infection) be treated immediately so that it cannot get into your bloodstream. If an infection ends up in your blood, it may infect the device, thus requiring us to remove it.

## 2022-01-11 NOTE — INTERVAL H&P NOTE
The patient has been examined and the H&P has been reviewed:    I concur with the findings and no changes have occurred since H&P was written.    Surgery risks, benefits and alternative options discussed and understood by patient/family.    Pt to have stage 2 dbs today      There are no hospital problems to display for this patient.

## 2022-01-11 NOTE — PLAN OF CARE
Patient arrived from OR.  Patient stable.  Report received at this time from Nara CROW and Edgar CHAPA.  Assumed care of patient at this time.

## 2022-01-12 NOTE — PLAN OF CARE
Patient and patient's daughter received discharge instructions and prescriptions.  Patient and patient's daughter verbalized understanding of all instructions given and all questions were addressed prior to patient's discharge.  Patient's vital signs are stable and within patient's baseline.  Patient tolerated clear liquids PO. Patient states pain is 0/10 and tolerable.  Patient denies nausea and vomiting at this time.  Patient meets all criteria for discharge at this time.  All required consents present in patient's chart upon patient's discharge.

## 2022-01-12 NOTE — OP NOTE
Date of Operation:  1/11/2022.    Preoperative Diagnosis:  Essential Tremor    Postoperative Diagnosis:  Essential Tremor.    Procedure:  Insertion of pulse generator for deep brain stimulation (Percept[email or web address])    Surgeon:  Neal Hernández MD    Assistant:  Jose Cooper MD (Resident in Neurosurgery)    Anesthesia:  General Endotracheal.    Estimated Blood Loss:  Less than 25 ml.    Condition at End of Procedure:  Satisfactory.    Brief History:  This 71 year old lady with Essential Tremor had deep brain stimulating electrodes placed in the bilateral VIM thalamic nuclei last week.  She now returns for insertion of the pulse generator.    Procedure in Detail:  The patient was brought to the operating room and in the supine position on the operating table under premedication intubated and induced with general anesthesia.  Sequential compression devices were applied to the legs and various intravenous line started.  The head was turned to the right and allowed to rest on a doughnut and the bed placed in reverse Trendelenburg position to take pressure out of the jugular veins.  The hair behind her left ear was reached shaved and this portion of the scalp neck and left upper chest prepped with Betadine and draped in a sterile fashion.  A small coronal incision above and behind her left ear and a transverse incision 3 fingerbreadths below the clavicle were outlined and these were injected with 1% xylocaine and epinephrine.  Operation was begun at the scalp.  The skin and galea were opened with the plasma blade and the two buried DBS electrodes with their lead kits delivered out of the wound using the plasma blade and hemostat for dissection.  The right-sided electrode is marked with a suture on the lead kit.  Attention was then turned to the chest.  The incision again opened with the plasma blade through the skin and subcutaneous tissue down to the pectoral fascia.  A subcutaneous pocket was created with the plasma  blade and blunt dissection on the chest wall and also superiorly over the clavicle.  The deltopectoral fascia and retromastoid fascia were opened with a hemostat.  The shunt passer was brought from the scalp down to the chest and 2 connecting leads brought up into the scalp wound.  The lead destined to be the left side is marked with a small dot.  The lead kit was removed from the left DBS electrode and this electrode inserted into the connecting lead the screw tightened with a torque wrench.  Similarly the lead kit was removed from the right-sided electrode and this was also placed into the right-sided connecting lead.  The distal ends of the connecting leads were inserted into a Percept dual channel generator with the left lead on top and the right lead on bottom and these 2 screws tightened with a torque wrench also.  The generator was then placed into the subcutaneous pocket, tested and found to have normal impedance in all channels.  The generator was affixed to the chest wall with 3-0 silk sutures.  The chest wound was thoroughly irrigated.  The subcutaneous tissue was closed in 2 layers a simple deep and more superficial inverted interrupted 3-0 Vicryl sutures and the skin closed with a 4-0 Monocryl suture half-inch Steri-Strips and Mastisol and covered with Telfa and Tegaderm.  The scalp incision was closed with inverted interrupted 3-0 Vicryl sutures in the galea and skin staples and covered with a Telfa bacitracin dressing held in place with tape.  The patient's head was returned to the supine position she, she was allowed to awaken from anesthesia, extubated, transferred back to her rLeola and returned to the outpatient recovery area in satisfactory condition.  She received 2 g of Rocephin at the beginning of the procedure and Rocephin containing antibiotic irrigating solutions were used throughout.

## 2022-01-20 ENCOUNTER — OFFICE VISIT (OUTPATIENT)
Dept: NEUROSURGERY | Facility: CLINIC | Age: 72
End: 2022-01-20
Payer: MEDICARE

## 2022-01-20 ENCOUNTER — OFFICE VISIT (OUTPATIENT)
Dept: NEUROLOGY | Facility: CLINIC | Age: 72
End: 2022-01-20
Payer: MEDICARE

## 2022-01-20 VITALS
DIASTOLIC BLOOD PRESSURE: 76 MMHG | HEIGHT: 66 IN | HEART RATE: 64 BPM | WEIGHT: 157 LBS | SYSTOLIC BLOOD PRESSURE: 124 MMHG | BODY MASS INDEX: 25.23 KG/M2

## 2022-01-20 VITALS
WEIGHT: 157.88 LBS | HEART RATE: 76 BPM | BODY MASS INDEX: 25.48 KG/M2 | DIASTOLIC BLOOD PRESSURE: 72 MMHG | SYSTOLIC BLOOD PRESSURE: 126 MMHG

## 2022-01-20 DIAGNOSIS — G25.0 ESSENTIAL TREMOR: Primary | ICD-10-CM

## 2022-01-20 DIAGNOSIS — G25.0 ESSENTIAL TREMOR: ICD-10-CM

## 2022-01-20 PROCEDURE — 99999 PR PBB SHADOW E&M-EST. PATIENT-LVL III: CPT | Mod: PBBFAC,,, | Performed by: PSYCHIATRY & NEUROLOGY

## 2022-01-20 PROCEDURE — 1111F DSCHRG MED/CURRENT MED MERGE: CPT | Mod: CPTII,S$GLB,, | Performed by: PSYCHIATRY & NEUROLOGY

## 2022-01-20 PROCEDURE — 95984 PR ELEC ANALYSIS, IMPLT NEURO PULSE GEN, W/PRGRM, BRAIN,  EA ADDTL 15 MINS: ICD-10-PCS | Mod: S$GLB,,, | Performed by: PSYCHIATRY & NEUROLOGY

## 2022-01-20 PROCEDURE — 99999 PR PBB SHADOW E&M-EST. PATIENT-LVL III: ICD-10-PCS | Mod: PBBFAC,,, | Performed by: PSYCHIATRY & NEUROLOGY

## 2022-01-20 PROCEDURE — 3074F SYST BP LT 130 MM HG: CPT | Mod: CPTII,S$GLB,, | Performed by: PSYCHIATRY & NEUROLOGY

## 2022-01-20 PROCEDURE — 95983 PR ELEC ANALYSIS, IMPLT NEURO PULSE GEN, W/PRGRM, BRAIN, 1ST 15 MINS: ICD-10-PCS | Mod: S$GLB,,, | Performed by: PSYCHIATRY & NEUROLOGY

## 2022-01-20 PROCEDURE — 3074F PR MOST RECENT SYSTOLIC BLOOD PRESSURE < 130 MM HG: ICD-10-PCS | Mod: CPTII,S$GLB,, | Performed by: PSYCHIATRY & NEUROLOGY

## 2022-01-20 PROCEDURE — 3008F BODY MASS INDEX DOCD: CPT | Mod: CPTII,S$GLB,, | Performed by: NEUROLOGICAL SURGERY

## 2022-01-20 PROCEDURE — 99024 POSTOP FOLLOW-UP VISIT: CPT | Mod: S$GLB,,, | Performed by: NEUROLOGICAL SURGERY

## 2022-01-20 PROCEDURE — 3078F DIAST BP <80 MM HG: CPT | Mod: CPTII,S$GLB,, | Performed by: PSYCHIATRY & NEUROLOGY

## 2022-01-20 PROCEDURE — 3078F PR MOST RECENT DIASTOLIC BLOOD PRESSURE < 80 MM HG: ICD-10-PCS | Mod: CPTII,S$GLB,, | Performed by: PSYCHIATRY & NEUROLOGY

## 2022-01-20 PROCEDURE — 3078F DIAST BP <80 MM HG: CPT | Mod: CPTII,S$GLB,, | Performed by: NEUROLOGICAL SURGERY

## 2022-01-20 PROCEDURE — 1101F PT FALLS ASSESS-DOCD LE1/YR: CPT | Mod: CPTII,S$GLB,, | Performed by: PSYCHIATRY & NEUROLOGY

## 2022-01-20 PROCEDURE — 3008F PR BODY MASS INDEX (BMI) DOCUMENTED: ICD-10-PCS | Mod: CPTII,S$GLB,, | Performed by: NEUROLOGICAL SURGERY

## 2022-01-20 PROCEDURE — 3008F PR BODY MASS INDEX (BMI) DOCUMENTED: ICD-10-PCS | Mod: CPTII,S$GLB,, | Performed by: PSYCHIATRY & NEUROLOGY

## 2022-01-20 PROCEDURE — 1101F PR PT FALLS ASSESS DOC 0-1 FALLS W/OUT INJ PAST YR: ICD-10-PCS | Mod: CPTII,S$GLB,, | Performed by: PSYCHIATRY & NEUROLOGY

## 2022-01-20 PROCEDURE — 95984 ALYS BRN NPGT PRGRMG ADDL 15: CPT | Mod: S$GLB,,, | Performed by: PSYCHIATRY & NEUROLOGY

## 2022-01-20 PROCEDURE — 99999 PR PBB SHADOW E&M-EST. PATIENT-LVL III: ICD-10-PCS | Mod: PBBFAC,,, | Performed by: NEUROLOGICAL SURGERY

## 2022-01-20 PROCEDURE — 1126F PR PAIN SEVERITY QUANTIFIED, NO PAIN PRESENT: ICD-10-PCS | Mod: CPTII,S$GLB,, | Performed by: PSYCHIATRY & NEUROLOGY

## 2022-01-20 PROCEDURE — 1160F PR REVIEW ALL MEDS BY PRESCRIBER/CLIN PHARMACIST DOCUMENTED: ICD-10-PCS | Mod: CPTII,S$GLB,, | Performed by: NEUROLOGICAL SURGERY

## 2022-01-20 PROCEDURE — 1160F RVW MEDS BY RX/DR IN RCRD: CPT | Mod: CPTII,S$GLB,, | Performed by: NEUROLOGICAL SURGERY

## 2022-01-20 PROCEDURE — 1111F PR DISCHARGE MEDS RECONCILED W/ CURRENT OUTPATIENT MED LIST: ICD-10-PCS | Mod: CPTII,S$GLB,, | Performed by: PSYCHIATRY & NEUROLOGY

## 2022-01-20 PROCEDURE — 1126F AMNT PAIN NOTED NONE PRSNT: CPT | Mod: CPTII,S$GLB,, | Performed by: NEUROLOGICAL SURGERY

## 2022-01-20 PROCEDURE — 99024 PR POST-OP FOLLOW-UP VISIT: ICD-10-PCS | Mod: S$GLB,,, | Performed by: NEUROLOGICAL SURGERY

## 2022-01-20 PROCEDURE — 3288F PR FALLS RISK ASSESSMENT DOCUMENTED: ICD-10-PCS | Mod: CPTII,S$GLB,, | Performed by: PSYCHIATRY & NEUROLOGY

## 2022-01-20 PROCEDURE — 95983 ALYS BRN NPGT PRGRMG 15 MIN: CPT | Mod: S$GLB,,, | Performed by: PSYCHIATRY & NEUROLOGY

## 2022-01-20 PROCEDURE — 3074F SYST BP LT 130 MM HG: CPT | Mod: CPTII,S$GLB,, | Performed by: NEUROLOGICAL SURGERY

## 2022-01-20 PROCEDURE — 3074F PR MOST RECENT SYSTOLIC BLOOD PRESSURE < 130 MM HG: ICD-10-PCS | Mod: CPTII,S$GLB,, | Performed by: NEUROLOGICAL SURGERY

## 2022-01-20 PROCEDURE — 1159F PR MEDICATION LIST DOCUMENTED IN MEDICAL RECORD: ICD-10-PCS | Mod: CPTII,S$GLB,, | Performed by: NEUROLOGICAL SURGERY

## 2022-01-20 PROCEDURE — 99214 PR OFFICE/OUTPT VISIT, EST, LEVL IV, 30-39 MIN: ICD-10-PCS | Mod: 25,S$GLB,, | Performed by: PSYCHIATRY & NEUROLOGY

## 2022-01-20 PROCEDURE — 99214 OFFICE O/P EST MOD 30 MIN: CPT | Mod: 25,S$GLB,, | Performed by: PSYCHIATRY & NEUROLOGY

## 2022-01-20 PROCEDURE — 1126F AMNT PAIN NOTED NONE PRSNT: CPT | Mod: CPTII,S$GLB,, | Performed by: PSYCHIATRY & NEUROLOGY

## 2022-01-20 PROCEDURE — 99999 PR PBB SHADOW E&M-EST. PATIENT-LVL III: CPT | Mod: PBBFAC,,, | Performed by: NEUROLOGICAL SURGERY

## 2022-01-20 PROCEDURE — 1159F MED LIST DOCD IN RCRD: CPT | Mod: CPTII,S$GLB,, | Performed by: NEUROLOGICAL SURGERY

## 2022-01-20 PROCEDURE — 3288F FALL RISK ASSESSMENT DOCD: CPT | Mod: CPTII,S$GLB,, | Performed by: PSYCHIATRY & NEUROLOGY

## 2022-01-20 PROCEDURE — 3008F BODY MASS INDEX DOCD: CPT | Mod: CPTII,S$GLB,, | Performed by: PSYCHIATRY & NEUROLOGY

## 2022-01-20 PROCEDURE — 1126F PR PAIN SEVERITY QUANTIFIED, NO PAIN PRESENT: ICD-10-PCS | Mod: CPTII,S$GLB,, | Performed by: NEUROLOGICAL SURGERY

## 2022-01-20 PROCEDURE — 3078F PR MOST RECENT DIASTOLIC BLOOD PRESSURE < 80 MM HG: ICD-10-PCS | Mod: CPTII,S$GLB,, | Performed by: NEUROLOGICAL SURGERY

## 2022-01-20 NOTE — PROGRESS NOTES
"Movement Eval    Interval Hx    Since last visit,     B/L VIM DBS  No major post op complications  No falls    [id number] primidone and gabapentin  .  "Hpi:  72 yo woman with hx HTN, ET coming for tremor and DBS eval. She notes hand tremors since age 28's. She notes a b/l hand tremor starting slightly in youth and progressive very slowly. A this point she struggles to do eyemakeup. Struggle to do her hair. Spills food often and she is embarrassed to go out even with weighted silverware. Cannot type. Struggles to text message. Likes to watercolor. In the last year she has a mouth and leg tremor. 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 No falls.   Her speech has changed in the last  6-8mos. Can go to a whisper.  At times when she writes her neck and hand muscles feel tight.    No ETOH sensitivity    Father had hand tremors at age 40's - no issues with gait  Paternal grandmother also with tremors    3 children with termor    MRI brain 2021 NL    Med hx  Has tried propranolol 80mg  Takes primidone 150mg and this  Gabapentin failed"      PD Review of Symptoms:  Anosmia: none  Dysarthria/Hypophonia: some scanning sperech  Dysphagia/Sialorrhea: none  Depression: longstanding  Cognitive slowing: none  Hallucinations: several times saw an insect crawling  Urinary changes: mild incontinence  Constipation: None  Orthostasis: mild  Falls: none  Freezing: none  Micrographia: none  Sleep issues:  -RBD: none      Past Medical History:   Diagnosis Date    Dyslipidemia     HTN (hypertension)     Tremor        PAST SURGICAL HISTORY:  Past Surgical History:   Procedure Laterality Date    DEEP BRAIN STIMULATOR PLACEMENT Bilateral 1/4/2022    Procedure: INSERTION, DEEP BRAIN STIMULATOR BILATERAL VIM;  Surgeon: Neal Hernández MD;  Location: [facility] OR [address] [place] [address];  Service: Neurosurgery;  Laterality: Bilateral;  BILATERAL (LEFT FIRST, RIGHT SECOND)  INSERTION ELECTRODES FOR DEEP BRAIN STIMULATION IN " VENTRALIS INTERMEDIUS/TEDS & SCD/ [facility]. CO SURGERY WITH DR JT MITCHELL.    ESOPHAGOGASTRODUODENOSCOPY N/A 2021    Procedure: EGD (ESOPHAGOGASTRODUODENOSCOPY);  Surgeon: Juan R Watson MD;  Location: [facility] ENDO;  Service: Endoscopy;  Laterality: N/A;  covid test  algiers, instr portal -ml    HAND SURGERY      HYSTERECTOMY      INSERTION OF DEEP BRAIN STIMULATOR GENERATOR Left 2022    Procedure: INSERTION, PULSE GENERATOR, DEEP BRAIN STIMULATOR;  Surgeon: Neal Hernández MD;  Location: [facility] OR 2ND FLR;  Service: Neurosurgery;  Laterality: Left;  INSERT PULSE GENERATOR FOR DEEP BRAIN STIMULATION/ TEDS & SCD/ MEDTRONICSALYSSIA.    PLACEMENT OF FIDUCIAL SCREW INTO SPINE N/A 2022    Procedure: INSERTION, FIDUCIAL SCREW, SKULL;  Surgeon: Neal Hernández MD;  Location: [facility] OR 2ND FLR;  Service: Neurosurgery;  Laterality: N/A;  INSERTION FIDUCIAL SCREWS FOR DEEP BRAIN STIMULATION/ TEDS & ACD/ [facility]TRONICS, ALYSSIA LANDRY/ CO SURGERY WITH DR JT MITCHELL       SOCIAL HISTORY:  Social History     Socioeconomic History    Marital status: Single   Tobacco Use    Smoking status: Former Smoker     Packs/day: 2.00     Years: 30.00     Pack years: 60.00     Start date: 1968     Quit date: 1999     Years since quittin.0    Smokeless tobacco: Never Used   Substance and Sexual Activity    Alcohol use: Not Currently    Drug use: Never       FAMILY HISTORY:  Family History   Problem Relation Age of Onset    Alzheimer's disease Mother     Cancer Father        ALLERGIES AND MEDICATIONS: updated and reviewed.  Review of patient's allergies indicates:   Allergen Reactions    Iodine and iodide containing products      Current Outpatient Medications   Medication Sig Dispense Refill    amLODIPine (NORVASC) 5 MG tablet Take 5 mg by mouth once daily.      gabapentin (NEURONTIN) 300 MG capsule Take 300 mg by mouth 3 (three) times daily.      primidone (MYSOLINE) 50 MG Tab Take 2  tablets (100 mg total) by mouth 3 (three) times daily. 180 tablet 11    sertraline (ZOLOFT) 100 MG tablet Take 100 mg by mouth once daily.       No current facility-administered medications for this visit.       Review of Systems   Constitutional: Negative for activity change, appetite change, fever and unexpected weight change.   HENT: Positive for trouble swallowing. Negative for voice change.    Eyes: Negative for photophobia and visual disturbance.   Respiratory: Positive for shortness of breath. Negative for apnea.    Cardiovascular: Negative for chest pain and leg swelling.   Gastrointestinal: Negative for constipation and nausea.   Genitourinary: Negative for difficulty urinating.   Musculoskeletal: Negative for back pain, gait problem and neck pain.   Skin: Negative for color change and pallor.   Neurological: Positive for tremors. Negative for dizziness, seizures, syncope, weakness and numbness.   Hematological: Negative for adenopathy.   Psychiatric/Behavioral: Positive for dysphoric mood. Negative for agitation, confusion and decreased concentration.       PHYSICAL:  /72   Pulse 76   Wt 71.6 kg (157 lb 13.6 oz)   BMI 25.48 kg/m²     General Medical Examination:  General: The patient is alert and cooperative with the exam.   HEENT: Normocephalic, atraumatic.   Neck: Supple.   Chest: Unlabored breathing.   CV: Symmetric pulses.   Ext: No clubbing, cyanosis, or edema.     Mental Status:  General: Good hygiene, appropriate appearance.  Mood/Affect: Appropriate/congruent.  Level of consciousness: Awake, alert.  Orientation: Oriented to person, place, time and situation.  Language: Mild scanning    Cranial nerves:  I: Not tested  II: PERRL, VFF to counting  III, IV, VI: EOMI with conjugate gaze and no nystagmus on end gaze  V: Facial sensation intact and symmetric over the bilateral V1-V3  VII: Facial muscle activation intact and symmetric over the bilateral upper and lower face  VIII: Hearing intact  in the b/l ears and symmetrical to finger rub  IX, X, XII: TUP midline  X: SCMs and shoulder shrug full strength b/l and symmetric    Motor:   Strength Intact throughout upper and lower extremities, 5/5.    DTRs:  ? Biceps Triceps Brachioradialis Knee Ankle   Left 3+ 2+ 2+ 3+    Right 3+ 2+ 2+ 3+      No cervical dystonia  At times her voice cuts out to a whisper    ? Finger taps Finger flicks BULL Heel taps   Left 0 0 0 0   Right 0 0 0 0   Neck tone: 0  ? Arm Leg   Left 0 0   Right 0 0     Sensation:   -Light touch: Intact and symmetric in the bilateral upper and lower extremities.    Coordination:   -Finger to nose: nl      Gait:  NL  -Arm Swing: mild decreased R        Tremor Exam   Arms extended Arms in wing position, fingers almost touching Re-emergent Arms extended wrists extended Intention Resting Kinetic   Left ?+ ? ?+ ? ? ? ?   Right ?+ ? ?+ ? ? ? ?   Head tremor: No-No  NEG       Archimedes Spirals   Left ?+++   Right ?+++       _______________________________________  Procedure:  Electrode type:  Neurostimulator type: Percept    Battery information: status ok.   Electrode impedance check:  Left hemisphere: No electrodes out of range at 1.0mA.  Right hemisphere: No electrodes out of range at 1.0mA.    LEFT  Pulse width set at 60; Frequency set at 120  left VIM Monopolar review  C&3 @1.5mA- No paraesthesias   Spirals No better  C&2 @1.5mA- No paraesthesias   Spirals Better  C&1 @1.3mA- Mild fleeting R arm paraesthesias   Spirals better  @1.5mA- Mild fleeting R arm paraesthesias   Spirals same  C&0 @1.0mA- Mild fleeting R arm paraesthesias   Spirals MUCH better (may be best contact but parasthesias stronger)    RIGHT  Pulse width set at 60; Frequency set at 120  right VIM Monopolar review  C&11 @1mA - dizzy feeling  C&10 @1.0mA  C&9 @0.5mA - strong fleeting parasthesias - not help to tremor   @1.0mA - great tremor control   @1.3mA - BEST tremor control  C&8 @0.5mA - strong fleeting parasthesias  @0.7mA good  tremor control    Final settings:  left VIM, C positive, 1 negative.  Amplitude 1.3 mA  60/120    right VIM, C positive, 9 negative.  Amplitude 1.3 mA  60/120        Vitals:    01/20/22 0823   BP: 126/72   Pulse: 76   Weight: 71.6 kg (157 lb 13.6 oz)       Assessment & Plan:    Problem List Items Addressed This Visit        Neuro    Essential tremor    Overview     Follow by Dr. Foss and Patricia.  Scheduled for DBS         Current Assessment & Plan     Typical fam hx and exam consistent with a debilitating essential tremor.  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

## 2022-01-20 NOTE — PROGRESS NOTES
Elsa Steel was seen in neurosurgical follow-up at the office this morning.  She underwent bilateral implantation of deep brain stimulating electrodes in the VIM thalamic nuclei on 01/04/2022 and then had placement of the pulse generator on 01/11/2022.  She was seen by Dr. Foss this morning and programmed and has had a nice result in tremor suppression from the system.  She returns for staple removal.    On examination today she is alert and doing well.  Her incisions are all well healed and the staples were removed.  The Steri-Strips are still in place on her chest.  These can be washed off in the shower.  It may take a time or two for them to fall off.    I will follow her through the Movement Disorders Clinic.

## 2022-01-20 NOTE — ASSESSMENT & PLAN NOTE
Typical fam hx and exam consistent with a debilitating essential tremor.  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

## 2022-02-21 ENCOUNTER — PATIENT MESSAGE (OUTPATIENT)
Dept: NEUROLOGY | Facility: CLINIC | Age: 72
End: 2022-02-21
Payer: MEDICARE

## 2022-02-22 ENCOUNTER — PATIENT MESSAGE (OUTPATIENT)
Dept: NEUROLOGY | Facility: CLINIC | Age: 72
End: 2022-02-22
Payer: MEDICARE

## 2022-02-23 ENCOUNTER — PATIENT MESSAGE (OUTPATIENT)
Dept: NEUROLOGY | Facility: CLINIC | Age: 72
End: 2022-02-23
Payer: MEDICARE

## 2022-03-03 ENCOUNTER — OFFICE VISIT (OUTPATIENT)
Dept: NEUROSURGERY | Facility: CLINIC | Age: 72
End: 2022-03-03
Payer: MEDICARE

## 2022-03-03 VITALS
HEIGHT: 66 IN | WEIGHT: 157 LBS | BODY MASS INDEX: 25.23 KG/M2 | DIASTOLIC BLOOD PRESSURE: 90 MMHG | SYSTOLIC BLOOD PRESSURE: 147 MMHG | HEART RATE: 73 BPM

## 2022-03-03 DIAGNOSIS — G25.0 ESSENTIAL TREMOR: Primary | ICD-10-CM

## 2022-03-03 PROCEDURE — 99024 PR POST-OP FOLLOW-UP VISIT: ICD-10-PCS | Mod: S$GLB,,, | Performed by: NEUROLOGICAL SURGERY

## 2022-03-03 PROCEDURE — 1126F AMNT PAIN NOTED NONE PRSNT: CPT | Mod: CPTII,S$GLB,, | Performed by: NEUROLOGICAL SURGERY

## 2022-03-03 PROCEDURE — 3077F PR MOST RECENT SYSTOLIC BLOOD PRESSURE >= 140 MM HG: ICD-10-PCS | Mod: CPTII,S$GLB,, | Performed by: NEUROLOGICAL SURGERY

## 2022-03-03 PROCEDURE — 1160F PR REVIEW ALL MEDS BY PRESCRIBER/CLIN PHARMACIST DOCUMENTED: ICD-10-PCS | Mod: CPTII,S$GLB,, | Performed by: NEUROLOGICAL SURGERY

## 2022-03-03 PROCEDURE — 3008F PR BODY MASS INDEX (BMI) DOCUMENTED: ICD-10-PCS | Mod: CPTII,S$GLB,, | Performed by: NEUROLOGICAL SURGERY

## 2022-03-03 PROCEDURE — 1160F RVW MEDS BY RX/DR IN RCRD: CPT | Mod: CPTII,S$GLB,, | Performed by: NEUROLOGICAL SURGERY

## 2022-03-03 PROCEDURE — 3008F BODY MASS INDEX DOCD: CPT | Mod: CPTII,S$GLB,, | Performed by: NEUROLOGICAL SURGERY

## 2022-03-03 PROCEDURE — 1126F PR PAIN SEVERITY QUANTIFIED, NO PAIN PRESENT: ICD-10-PCS | Mod: CPTII,S$GLB,, | Performed by: NEUROLOGICAL SURGERY

## 2022-03-03 PROCEDURE — 1159F PR MEDICATION LIST DOCUMENTED IN MEDICAL RECORD: ICD-10-PCS | Mod: CPTII,S$GLB,, | Performed by: NEUROLOGICAL SURGERY

## 2022-03-03 PROCEDURE — 3080F PR MOST RECENT DIASTOLIC BLOOD PRESSURE >= 90 MM HG: ICD-10-PCS | Mod: CPTII,S$GLB,, | Performed by: NEUROLOGICAL SURGERY

## 2022-03-03 PROCEDURE — 99024 POSTOP FOLLOW-UP VISIT: CPT | Mod: S$GLB,,, | Performed by: NEUROLOGICAL SURGERY

## 2022-03-03 PROCEDURE — 99999 PR PBB SHADOW E&M-EST. PATIENT-LVL III: CPT | Mod: PBBFAC,,, | Performed by: NEUROLOGICAL SURGERY

## 2022-03-03 PROCEDURE — 1159F MED LIST DOCD IN RCRD: CPT | Mod: CPTII,S$GLB,, | Performed by: NEUROLOGICAL SURGERY

## 2022-03-03 PROCEDURE — 3077F SYST BP >= 140 MM HG: CPT | Mod: CPTII,S$GLB,, | Performed by: NEUROLOGICAL SURGERY

## 2022-03-03 PROCEDURE — 3080F DIAST BP >= 90 MM HG: CPT | Mod: CPTII,S$GLB,, | Performed by: NEUROLOGICAL SURGERY

## 2022-03-03 PROCEDURE — 99999 PR PBB SHADOW E&M-EST. PATIENT-LVL III: ICD-10-PCS | Mod: PBBFAC,,, | Performed by: NEUROLOGICAL SURGERY

## 2022-03-03 RX ORDER — TRIAMCINOLONE ACETONIDE 1 MG/G
OINTMENT TOPICAL 2 TIMES DAILY
Qty: 15 G | Refills: 1 | Status: SHIPPED | OUTPATIENT
Start: 2022-03-03

## 2022-03-03 NOTE — PROGRESS NOTES
Elsa Steel was seen in neurosurgical follow-up at the office today.  I last saw her on 01/20/2022 to remove her skin staples from the scalp.  At that time she still had Steri-Strips on her chest and they were to be washed off in the shower.  In the last week or so she has noted some erythema around the incision.  This has not been painful or pruritic for her.  She has had programming and had considerable improvement in her hand tremors.  Dr. Foss is continuing to work with her for programming.    On brief examination she is bright and alert.  Her wounds all seem well healed.  There is some minimal mottled erythema around the chest incision up on to the chest.  The end of the Monocryl suture is visible at the lateral end of the incision.  There is no fluctuance and the generator feels like it is in good position.    I do not believe that she is experiencing infection.  She may have a little reaction to the Steri-Strips and Mastisol.  I will give her some triamcinolone cream to put on the area for the next week or two. She is to follow-up for additional programming in the Movement Disorders Clinic.

## 2022-05-12 ENCOUNTER — OFFICE VISIT (OUTPATIENT)
Dept: NEUROLOGY | Facility: CLINIC | Age: 72
End: 2022-05-12
Payer: MEDICARE

## 2022-05-12 VITALS
SYSTOLIC BLOOD PRESSURE: 125 MMHG | BODY MASS INDEX: 24.82 KG/M2 | HEART RATE: 67 BPM | HEIGHT: 66 IN | DIASTOLIC BLOOD PRESSURE: 78 MMHG | WEIGHT: 154.44 LBS

## 2022-05-12 DIAGNOSIS — G25.0 ESSENTIAL TREMOR: ICD-10-CM

## 2022-05-12 PROCEDURE — 95983 PR ELEC ANALYSIS, IMPLT NEURO PULSE GEN, W/PRGRM, BRAIN, 1ST 15 MINS: ICD-10-PCS | Mod: S$GLB,,, | Performed by: PSYCHIATRY & NEUROLOGY

## 2022-05-12 PROCEDURE — 95984 ALYS BRN NPGT PRGRMG ADDL 15: CPT | Mod: S$GLB,,, | Performed by: PSYCHIATRY & NEUROLOGY

## 2022-05-12 PROCEDURE — 1159F PR MEDICATION LIST DOCUMENTED IN MEDICAL RECORD: ICD-10-PCS | Mod: CPTII,S$GLB,, | Performed by: PSYCHIATRY & NEUROLOGY

## 2022-05-12 PROCEDURE — 3078F DIAST BP <80 MM HG: CPT | Mod: CPTII,S$GLB,, | Performed by: PSYCHIATRY & NEUROLOGY

## 2022-05-12 PROCEDURE — 3074F SYST BP LT 130 MM HG: CPT | Mod: CPTII,S$GLB,, | Performed by: PSYCHIATRY & NEUROLOGY

## 2022-05-12 PROCEDURE — 3288F FALL RISK ASSESSMENT DOCD: CPT | Mod: CPTII,S$GLB,, | Performed by: PSYCHIATRY & NEUROLOGY

## 2022-05-12 PROCEDURE — 3008F PR BODY MASS INDEX (BMI) DOCUMENTED: ICD-10-PCS | Mod: CPTII,S$GLB,, | Performed by: PSYCHIATRY & NEUROLOGY

## 2022-05-12 PROCEDURE — 1126F PR PAIN SEVERITY QUANTIFIED, NO PAIN PRESENT: ICD-10-PCS | Mod: CPTII,S$GLB,, | Performed by: PSYCHIATRY & NEUROLOGY

## 2022-05-12 PROCEDURE — 1159F MED LIST DOCD IN RCRD: CPT | Mod: CPTII,S$GLB,, | Performed by: PSYCHIATRY & NEUROLOGY

## 2022-05-12 PROCEDURE — 95984 PR ELEC ANALYSIS, IMPLT NEURO PULSE GEN, W/PRGRM, BRAIN,  EA ADDTL 15 MINS: ICD-10-PCS | Mod: S$GLB,,, | Performed by: PSYCHIATRY & NEUROLOGY

## 2022-05-12 PROCEDURE — 99999 PR PBB SHADOW E&M-EST. PATIENT-LVL II: ICD-10-PCS | Mod: PBBFAC,,, | Performed by: PSYCHIATRY & NEUROLOGY

## 2022-05-12 PROCEDURE — 1101F PR PT FALLS ASSESS DOC 0-1 FALLS W/OUT INJ PAST YR: ICD-10-PCS | Mod: CPTII,S$GLB,, | Performed by: PSYCHIATRY & NEUROLOGY

## 2022-05-12 PROCEDURE — 3074F PR MOST RECENT SYSTOLIC BLOOD PRESSURE < 130 MM HG: ICD-10-PCS | Mod: CPTII,S$GLB,, | Performed by: PSYCHIATRY & NEUROLOGY

## 2022-05-12 PROCEDURE — 1126F AMNT PAIN NOTED NONE PRSNT: CPT | Mod: CPTII,S$GLB,, | Performed by: PSYCHIATRY & NEUROLOGY

## 2022-05-12 PROCEDURE — 99215 PR OFFICE/OUTPT VISIT, EST, LEVL V, 40-54 MIN: ICD-10-PCS | Mod: 25,S$GLB,, | Performed by: PSYCHIATRY & NEUROLOGY

## 2022-05-12 PROCEDURE — 99215 OFFICE O/P EST HI 40 MIN: CPT | Mod: 25,S$GLB,, | Performed by: PSYCHIATRY & NEUROLOGY

## 2022-05-12 PROCEDURE — 1101F PT FALLS ASSESS-DOCD LE1/YR: CPT | Mod: CPTII,S$GLB,, | Performed by: PSYCHIATRY & NEUROLOGY

## 2022-05-12 PROCEDURE — 3078F PR MOST RECENT DIASTOLIC BLOOD PRESSURE < 80 MM HG: ICD-10-PCS | Mod: CPTII,S$GLB,, | Performed by: PSYCHIATRY & NEUROLOGY

## 2022-05-12 PROCEDURE — 3008F BODY MASS INDEX DOCD: CPT | Mod: CPTII,S$GLB,, | Performed by: PSYCHIATRY & NEUROLOGY

## 2022-05-12 PROCEDURE — 3288F PR FALLS RISK ASSESSMENT DOCUMENTED: ICD-10-PCS | Mod: CPTII,S$GLB,, | Performed by: PSYCHIATRY & NEUROLOGY

## 2022-05-12 PROCEDURE — 95983 ALYS BRN NPGT PRGRMG 15 MIN: CPT | Mod: S$GLB,,, | Performed by: PSYCHIATRY & NEUROLOGY

## 2022-05-12 PROCEDURE — 99999 PR PBB SHADOW E&M-EST. PATIENT-LVL II: CPT | Mod: PBBFAC,,, | Performed by: PSYCHIATRY & NEUROLOGY

## 2022-05-12 NOTE — PROGRESS NOTES
"Movement Eval    Interval Hx    Since last visit,     B/L VIM DBS  No major post op complications  No falls    L hand better controlled than R  Tremor control is good but     Turns off DBS at night  One fall    Some insomnia  Continues primidone and gabapentin      "Hpi:  70 yo woman with hx HTN, ET coming for tremor and DBS eval. She notes hand tremors since age 28's. She notes a b/l hand tremor starting slightly in youth and progressive very slowly. A this point she struggles to do eyemakeup. Struggle to do her hair. Spills food often and she is embarrassed to go out even with weighted silverware. Cannot type. Struggles to text message. Likes to [employer]. In the last year she has a mouth and leg tremor. 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 No falls.   Her speech has changed in the last  6-8mos. Can go to a whisper.  At times when she writes her neck and hand muscles feel tight.    No ETOH sensitivity    Father had hand tremors at age 40's - no issues with gait  Paternal grandmother also with tremors    3 children with termor    MRI brain 2021 NL    Med hx  Has tried propranolol 80mg  Takes primidone 150mg and this  Gabapentin failed"      PD Review of Symptoms:  Anosmia: none  Dysarthria/Hypophonia: some scanning sperech  Dysphagia/Sialorrhea: none  Depression: longstanding  Cognitive slowing: none  Hallucinations: several times saw an insect crawling  Urinary changes: mild incontinence  Constipation: None  Orthostasis: mild  Falls: none  Freezing: none  Micrographia: none  Sleep issues:  -RBD: none      Past Medical History:   Diagnosis Date    Dyslipidemia     HTN (hypertension)     Tremor        PAST SURGICAL HISTORY:  Past Surgical History:   Procedure Laterality Date    DEEP BRAIN STIMULATOR PLACEMENT Bilateral 1/4/2022    Procedure: INSERTION, DEEP BRAIN STIMULATOR BILATERAL VIM;  Surgeon: Neal Hernández MD;  Location: [facility] OR [address] [place] [address];  Service: Neurosurgery;  " Laterality: Bilateral;  BILATERAL (LEFT FIRST, RIGHT SECOND)  INSERTION ELECTRODES FOR DEEP BRAIN STIMULATION IN VENTRALIS INTERMEDIUS/TEDS & SCD/ [employer], ALYSSIA WHITE. CO SURGERY WITH DR JT MITCHELL.    ESOPHAGOGASTRODUODENOSCOPY N/A 2021    Procedure: EGD (ESOPHAGOGASTRODUODENOSCOPY);  Surgeon: Juan R Watson MD;  Location: [facility] [place] ENDO;  Service: Endoscopy;  Laterality: N/A;  covid test  algiers, instr portal -ml    HAND SURGERY      HYSTERECTOMY      INSERTION OF DEEP BRAIN STIMULATOR GENERATOR Left 2022    Procedure: INSERTION, PULSE GENERATOR, DEEP BRAIN STIMULATOR;  Surgeon: Neal Hernández MD;  Location: [facility] OR 2ND FLR;  Service: Neurosurgery;  Laterality: Left;  INSERT PULSE GENERATOR FOR DEEP BRAIN STIMULATION/ TEDS & SCD/ [employer], ALYSSIA WHITE.    PLACEMENT OF FIDUCIAL SCREW INTO SPINE N/A 2022    Procedure: INSERTION, FIDUCIAL SCREW, SKULL;  Surgeon: Neal Hernández MD;  Location: [facility] OR 2ND FLR;  Service: Neurosurgery;  Laterality: N/A;  INSERTION FIDUCIAL SCREWS FOR DEEP BRAIN STIMULATION/ TEDS & ACD/ [facility], ALYSSIA WHITE/ CO SURGERY WITH DR JT MITCHELL       SOCIAL HISTORY:  Social History     Socioeconomic History    Marital status: Single   Tobacco Use    Smoking status: Former Smoker     Packs/day: 2.00     Years: 30.00     Pack years: 60.00     Start date: 1968     Quit date: 1999     Years since quittin.3    Smokeless tobacco: Never Used   Substance and Sexual Activity    Alcohol use: Not Currently    Drug use: Never       FAMILY HISTORY:  Family History   Problem Relation Age of Onset    Alzheimer's disease Mother     Cancer Father        ALLERGIES AND MEDICATIONS: updated and reviewed.  Review of patient's allergies indicates:   Allergen Reactions    Iodine and iodide containing products      Current Outpatient Medications   Medication Sig Dispense Refill    amLODIPine (NORVASC) 5 MG tablet Take 5 mg by mouth once daily.      gabapentin  "(NEURONTIN) 300 MG capsule Take 300 mg by mouth 3 (three) times daily.      primidone (MYSOLINE) 50 MG Tab Take 2 tablets (100 mg total) by mouth 3 (three) times daily. 180 tablet 11    sertraline (ZOLOFT) 100 MG tablet Take 100 mg by mouth once daily.      triamcinolone acetonide 0.1% (KENALOG) 0.1 % ointment Apply topically 2 (two) times daily. 15 g 1     No current facility-administered medications for this visit.       Review of Systems   Constitutional: Negative for activity change, appetite change, fever and unexpected weight change.   HENT: Positive for trouble swallowing. Negative for voice change.    Eyes: Negative for photophobia and visual disturbance.   Respiratory: Negative for apnea and shortness of breath.    Cardiovascular: Negative for chest pain and leg swelling.   Gastrointestinal: Negative for constipation and nausea.   Genitourinary: Negative for difficulty urinating.   Musculoskeletal: Negative for back pain, gait problem and neck pain.   Skin: Negative for color change and pallor.   Neurological: Positive for tremors. Negative for dizziness, seizures, syncope, weakness and numbness.   Hematological: Negative for adenopathy.   Psychiatric/Behavioral: Positive for dysphoric mood. Negative for agitation, confusion and decreased concentration.       PHYSICAL:  /78 (BP Location: Left arm, Patient Position: Sitting)   Pulse 67   Ht 5' 6" (1.676 m)   Wt 70.1 kg (154 lb 6.9 oz)   BMI 24.93 kg/m²     General Medical Examination:  General: The patient is alert and cooperative with the exam.   HEENT: Normocephalic, atraumatic.   Neck: Supple.   Chest: Unlabored breathing.   CV: Symmetric pulses.   Ext: No clubbing, cyanosis, or edema.     Mental Status:  General: Good hygiene, appropriate appearance.  Mood/Affect: Appropriate/congruent.  Level of consciousness: Awake, alert.  Orientation: Oriented to person, place, time and situation.  Language: Mild scanning    Cranial nerves:  I: Not " tested  II: PERRL, VFF to counting  III, IV, VI: EOMI with conjugate gaze and no nystagmus on end gaze  V: Facial sensation intact and symmetric over the bilateral V1-V3  VII: Facial muscle activation intact and symmetric over the bilateral upper and lower face  VIII: Hearing intact in the b/l ears and symmetrical to finger rub  IX, X, XII: TUP midline  X: SCMs and shoulder shrug full strength b/l and symmetric    Motor:   Strength Intact throughout upper and lower extremities, 5/5.    DTRs:  ? Biceps Triceps Brachioradialis Knee Ankle   Left 3+ 2+ 2+ 3+    Right 3+ 2+ 2+ 3+      No cervical dystonia  At times her voice cuts out to a whisper    ? Finger taps Finger flicks BULL Heel taps   Left 0 0 0 0   Right 0 0 0 0   Neck tone: 0  ? Arm Leg   Left 0 0   Right 0 0     Sensation:   -Light touch: Intact and symmetric in the bilateral upper and lower extremities.    Coordination:   -Finger to nose: nl  -BULL OK    Gait:  NL  -Arm Swing: mild decreased R        Tremor Exam   Arms extended Arms in wing position, fingers almost touching Re-emergent Arms extended wrists extended Intention Resting Kinetic   Left ?+ ? ?+ ? ?+ ? ?   Right ?+ ? ?+ ? ?+ ? ?   Head tremor: No-No  NEG       Archimedes Spirals   Left ?++   Right ?++       5/12/22 DBS  Procedure:  Electrode type:  Neurostimulator type: Percept    Battery information: status ok.   Electrode impedance check:  Left hemisphere: No electrodes out of range at 1.0mA.  Right hemisphere: No electrodes out of range at 1.0mA.    Initial settings:  left VIM, C positive, 1 negative.  Amplitude 1.3 mA  60/120    right VIM, C positive, 9 negative.  Amplitude 1.3 mA  60/120    Programming - appears to have scanning speech and intention tremor R>L when stim ON- program to block cerebellar tracts    Turned stim OFF- speech better  B/L hand tremors come back    Copied Group A to B - block 0  left VIM, 0 positive, 1 negative.  Amplitude 1.5 mA  60/120  Postural tremor  controlled  Intention tremor remains    Segments  1a ON - parasthesias mild  left VIM,   Amplitude 1.5 mA  60/120  Postural tremor controlled  Intention tremor remains    1b ON - parasthesias mild - BEST  left VIM,   Amplitude 1.5 mA  60/120  Postural tremor controlled  Intention tremor better    1c ON- parasthesias mild  left VIM,   Amplitude 1.5 mA  60/120  Postural tremor controlled  Intention tremor remains  (maybe worse)    Made 1b Group C  1b ON - parasthesias mild - BEST  left VIM,   Amplitude 1.5 mA  60/120  Postural tremor controlled  Intention tremor better    Group D - 2ringmode to get away from cerebellar tracts  left VIM,   C+ 2- ringmode  Amplitude 1.5 mA  60/120  Postural tremor controlled  Intention tremor better    FINAL   Group A - OLD  left VIM, C positive, 1 negative.  Amplitude 1.3 mA  60/120     Group B - block 0  left VIM, 0 positive, 1 negative.  Amplitude 1.5 mA  60/120    Group C- 1b segment mode  1b ON - parasthesias mild - BEST  left VIM,   Amplitude 1.5 mA  60/120    Group D - 2ringmode   left VIM,   C+ 2- ringmode  Amplitude 1.5 mA  60/120    R VIM same between groups  right VIM, C positive, 9 negative.  Amplitude 1.3 mA  60/120  _______________________________________  Prior Procedure:  Electrode type:  Neurostimulator type: Percept    Battery information: status ok.   Electrode impedance check:  Left hemisphere: No electrodes out of range at 1.0mA.  Right hemisphere: No electrodes out of range at 1.0mA.    LEFT  Pulse width set at 60; Frequency set at 120  left VIM Monopolar review  C&3 @1.5mA- No paraesthesias   Spirals No better  C&2 @1.5mA- No paraesthesias   Spirals Better  C&1 @1.3mA- Mild fleeting R arm paraesthesias   Spirals better  @1.5mA- Mild fleeting R arm paraesthesias   Spirals same  C&0 @1.0mA- Mild fleeting R arm paraesthesias   Spirals MUCH better (may be best contact but parasthesias stronger)    RIGHT  Pulse width set at 60; Frequency set at 120  right VIM Monopolar  "review  C&11 @1mA - dizzy feeling  C&10 @1.0mA  C&9 @0.5mA - strong fleeting parasthesias - not help to tremor   @1.0mA - great tremor control   @1.3mA - BEST tremor control  C&8 @0.5mA - strong fleeting parasthesias  @0.7mA good tremor control    Final settings:  left VIM, C positive, 1 negative.  Amplitude 1.3 mA  60/120    right VIM, C positive, 9 negative.  Amplitude 1.3 mA  60/120        Vitals:    05/12/22 0745   BP: 125/78   BP Location: Left arm   Patient Position: Sitting   Pulse: 67   Weight: 70.1 kg (154 lb 6.9 oz)   Height: 5' 6" (1.676 m)       Assessment & Plan:    Problem List Items Addressed This Visit        Neuro    Essential tremor    Overview     Follow by Dr. Foss and Patricia.  Scheduled for DBS           Current Assessment & Plan     Typical fam hx and exam consistent with a debilitating essential tremor.  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

## 2022-05-13 NOTE — ASSESSMENT & PLAN NOTE
Typical fam hx and exam consistent with a debilitating essential tremor.  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

## 2022-06-22 ENCOUNTER — TELEPHONE (OUTPATIENT)
Dept: NEUROLOGY | Facility: CLINIC | Age: 72
End: 2022-06-22
Payer: MEDICARE

## 2022-06-22 NOTE — TELEPHONE ENCOUNTER
----- Message from Aisha Bob sent at 6/22/2022 11:14 AM CDT -----  Regarding: appt  Type:  Sooner Apoointment Request    Caller is requesting a sooner appointment.  Caller declined first available appointment listed below.  Caller will not accept being placed on the waitlist and is requesting a message be sent to doctor.  Name of Caller:patient     When is the first available appointment?n/a    Symptoms:appt at 8am    Would the patient rather a call back or a response via [facility]? Call back   Best Call Back Number:[phone]  Additional Information: n/a

## 2022-06-22 NOTE — TELEPHONE ENCOUNTER
Called and spoke with patient and informed that Dr. Foss's schedule for September was blocked and as soon as it opens she would be scheduled for her follow up. Patient voiced understanding.

## 2022-09-04 ENCOUNTER — PATIENT MESSAGE (OUTPATIENT)
Dept: NEUROSURGERY | Facility: CLINIC | Age: 72
End: 2022-09-04
Payer: MEDICARE

## 2022-12-11 ENCOUNTER — HOSPITAL ENCOUNTER (OUTPATIENT)
Facility: HOSPITAL | Age: 72
Discharge: HOME | End: 2022-12-14
Attending: EMERGENCY MEDICINE | Admitting: HOSPITALIST
Payer: MEDICARE

## 2022-12-11 DIAGNOSIS — K81.9 CHOLECYSTITIS: Primary | ICD-10-CM

## 2022-12-11 DIAGNOSIS — R91.8 PULMONARY NODULES: ICD-10-CM

## 2022-12-11 DIAGNOSIS — R79.89 ELEVATED TROPONIN: ICD-10-CM

## 2022-12-11 DIAGNOSIS — I50.32 CHRONIC DIASTOLIC HEART FAILURE: ICD-10-CM

## 2022-12-11 DIAGNOSIS — R07.9 CHEST PAIN: ICD-10-CM

## 2022-12-11 DIAGNOSIS — I10 HTN (HYPERTENSION): ICD-10-CM

## 2022-12-11 PROBLEM — R10.9 ABDOMINAL PAIN: Status: ACTIVE | Noted: 2022-12-11

## 2022-12-11 LAB
ALBUMIN SERPL BCP-MCNC: 3.7 G/DL (ref 3.5–5.2)
ALBUMIN SERPL BCP-MCNC: 4 G/DL (ref 3.5–5.2)
ALP SERPL-CCNC: 100 U/L (ref 55–135)
ALP SERPL-CCNC: 99 U/L (ref 55–135)
ALT SERPL W/O P-5'-P-CCNC: 15 U/L (ref 10–44)
ALT SERPL W/O P-5'-P-CCNC: 25 U/L (ref 10–44)
ANION GAP SERPL CALC-SCNC: 13 MMOL/L (ref 8–16)
AST SERPL-CCNC: 23 U/L (ref 10–40)
AST SERPL-CCNC: 32 U/L (ref 10–40)
BASOPHILS # BLD AUTO: 0.06 K/UL (ref 0–0.2)
BASOPHILS NFR BLD: 0.6 % (ref 0–1.9)
BILIRUB DIRECT SERPL-MCNC: 0.1 MG/DL (ref 0.1–0.3)
BILIRUB SERPL-MCNC: 0.2 MG/DL (ref 0.1–1)
BILIRUB SERPL-MCNC: 0.2 MG/DL (ref 0.1–1)
BILIRUB UR QL STRIP: NEGATIVE
BNP SERPL-MCNC: 61 PG/ML (ref 0–99)
BUN SERPL-MCNC: 15 MG/DL (ref 8–23)
CALCIUM SERPL-MCNC: 8.9 MG/DL (ref 8.7–10.5)
CHLORIDE SERPL-SCNC: 105 MMOL/L (ref 95–110)
CHOLEST SERPL-MCNC: 298 MG/DL (ref 120–199)
CHOLEST SERPL-MCNC: 301 MG/DL (ref 120–199)
CHOLEST/HDLC SERPL: 6 {RATIO} (ref 2–5)
CHOLEST/HDLC SERPL: 6.6 {RATIO} (ref 2–5)
CLARITY UR: CLEAR
CO2 SERPL-SCNC: 24 MMOL/L (ref 23–29)
COLOR UR: COLORLESS
CREAT SERPL-MCNC: 0.7 MG/DL (ref 0.5–1.4)
D DIMER PPP IA.FEU-MCNC: 1.8 MG/L FEU
DIFFERENTIAL METHOD: ABNORMAL
EOSINOPHIL # BLD AUTO: 0.4 K/UL (ref 0–0.5)
EOSINOPHIL NFR BLD: 4 % (ref 0–8)
ERYTHROCYTE [DISTWIDTH] IN BLOOD BY AUTOMATED COUNT: 13.2 % (ref 11.5–14.5)
EST. GFR  (NO RACE VARIABLE): >60 ML/MIN/1.73 M^2
GLUCOSE SERPL-MCNC: 120 MG/DL (ref 70–110)
GLUCOSE UR QL STRIP: NEGATIVE
HCT VFR BLD AUTO: 41.8 % (ref 37–48.5)
HDLC SERPL-MCNC: 45 MG/DL (ref 40–75)
HDLC SERPL-MCNC: 50 MG/DL (ref 40–75)
HDLC SERPL: 15.1 % (ref 20–50)
HDLC SERPL: 16.6 % (ref 20–50)
HGB BLD-MCNC: 14.5 G/DL (ref 12–16)
HGB UR QL STRIP: NEGATIVE
IMM GRANULOCYTES # BLD AUTO: 0.03 K/UL (ref 0–0.04)
IMM GRANULOCYTES NFR BLD AUTO: 0.3 % (ref 0–0.5)
KETONES UR QL STRIP: NEGATIVE
LACTATE SERPL-SCNC: 1.4 MMOL/L (ref 0.5–2.2)
LDLC SERPL CALC-MCNC: 199 MG/DL (ref 63–159)
LDLC SERPL CALC-MCNC: 212.8 MG/DL (ref 63–159)
LEUKOCYTE ESTERASE UR QL STRIP: ABNORMAL
LIPASE SERPL-CCNC: 21 U/L (ref 4–60)
LYMPHOCYTES # BLD AUTO: 1.9 K/UL (ref 1–4.8)
LYMPHOCYTES NFR BLD: 20.8 % (ref 18–48)
MCH RBC QN AUTO: 27.5 PG (ref 27–31)
MCHC RBC AUTO-ENTMCNC: 34.7 G/DL (ref 32–36)
MCV RBC AUTO: 79 FL (ref 82–98)
MICROSCOPIC COMMENT: NORMAL
MONOCYTES # BLD AUTO: 0.8 K/UL (ref 0.3–1)
MONOCYTES NFR BLD: 8.8 % (ref 4–15)
NEUTROPHILS # BLD AUTO: 6.1 K/UL (ref 1.8–7.7)
NEUTROPHILS NFR BLD: 65.5 % (ref 38–73)
NITRITE UR QL STRIP: NEGATIVE
NONHDLC SERPL-MCNC: 251 MG/DL
NONHDLC SERPL-MCNC: 253 MG/DL
NRBC BLD-RTO: 0 /100 WBC
PH UR STRIP: 8 [PH] (ref 5–8)
PLATELET # BLD AUTO: 293 K/UL (ref 150–450)
PMV BLD AUTO: 10.3 FL (ref 9.2–12.9)
POTASSIUM SERPL-SCNC: 4 MMOL/L (ref 3.5–5.1)
PROT SERPL-MCNC: 7.6 G/DL (ref 6–8.4)
PROT SERPL-MCNC: 7.9 G/DL (ref 6–8.4)
PROT UR QL STRIP: NEGATIVE
RBC # BLD AUTO: 5.28 M/UL (ref 4–5.4)
RBC #/AREA URNS HPF: 1 /HPF (ref 0–4)
SODIUM SERPL-SCNC: 142 MMOL/L (ref 136–145)
SP GR UR STRIP: 1.01 (ref 1–1.03)
TRIGL SERPL-MCNC: 191 MG/DL (ref 30–150)
TRIGL SERPL-MCNC: 270 MG/DL (ref 30–150)
TROPONIN I SERPL DL<=0.01 NG/ML-MCNC: 0.05 NG/ML (ref 0–0.03)
TROPONIN I SERPL DL<=0.01 NG/ML-MCNC: <0.006 NG/ML (ref 0–0.03)
URN SPEC COLLECT METH UR: ABNORMAL
UROBILINOGEN UR STRIP-ACNC: NEGATIVE EU/DL
WBC # BLD AUTO: 9.28 K/UL (ref 3.9–12.7)
WBC #/AREA URNS HPF: 2 /HPF (ref 0–5)

## 2022-12-11 PROCEDURE — 83605 ASSAY OF LACTIC ACID: CPT | Performed by: EMERGENCY MEDICINE

## 2022-12-11 PROCEDURE — 93010 ELECTROCARDIOGRAM REPORT: CPT | Mod: ,,, | Performed by: INTERNAL MEDICINE

## 2022-12-11 PROCEDURE — 93005 ELECTROCARDIOGRAM TRACING: CPT

## 2022-12-11 PROCEDURE — 25000003 PHARM REV CODE 250: Performed by: PHYSICIAN ASSISTANT

## 2022-12-11 PROCEDURE — 85025 COMPLETE CBC W/AUTO DIFF WBC: CPT | Performed by: EMERGENCY MEDICINE

## 2022-12-11 PROCEDURE — 84484 ASSAY OF TROPONIN QUANT: CPT | Performed by: EMERGENCY MEDICINE

## 2022-12-11 PROCEDURE — 83690 ASSAY OF LIPASE: CPT | Performed by: EMERGENCY MEDICINE

## 2022-12-11 PROCEDURE — 96365 THER/PROPH/DIAG IV INF INIT: CPT

## 2022-12-11 PROCEDURE — 81000 URINALYSIS NONAUTO W/SCOPE: CPT | Performed by: EMERGENCY MEDICINE

## 2022-12-11 PROCEDURE — 87040 BLOOD CULTURE FOR BACTERIA: CPT | Performed by: EMERGENCY MEDICINE

## 2022-12-11 PROCEDURE — G0378 HOSPITAL OBSERVATION PER HR: HCPCS

## 2022-12-11 PROCEDURE — 96367 TX/PROPH/DG ADDL SEQ IV INF: CPT

## 2022-12-11 PROCEDURE — 96366 THER/PROPH/DIAG IV INF ADDON: CPT

## 2022-12-11 PROCEDURE — 96361 HYDRATE IV INFUSION ADD-ON: CPT

## 2022-12-11 PROCEDURE — 80076 HEPATIC FUNCTION PANEL: CPT | Performed by: EMERGENCY MEDICINE

## 2022-12-11 PROCEDURE — 85379 FIBRIN DEGRADATION QUANT: CPT | Performed by: EMERGENCY MEDICINE

## 2022-12-11 PROCEDURE — 96375 TX/PRO/DX INJ NEW DRUG ADDON: CPT

## 2022-12-11 PROCEDURE — 96376 TX/PRO/DX INJ SAME DRUG ADON: CPT

## 2022-12-11 PROCEDURE — 25000003 PHARM REV CODE 250: Performed by: EMERGENCY MEDICINE

## 2022-12-11 PROCEDURE — 83880 ASSAY OF NATRIURETIC PEPTIDE: CPT | Performed by: EMERGENCY MEDICINE

## 2022-12-11 PROCEDURE — 99285 EMERGENCY DEPT VISIT HI MDM: CPT | Mod: 25

## 2022-12-11 PROCEDURE — 63600175 PHARM REV CODE 636 W HCPCS: Performed by: PHYSICIAN ASSISTANT

## 2022-12-11 PROCEDURE — 93010 EKG 12-LEAD: ICD-10-PCS | Mod: ,,, | Performed by: INTERNAL MEDICINE

## 2022-12-11 PROCEDURE — 80061 LIPID PANEL: CPT | Mod: 91 | Performed by: EMERGENCY MEDICINE

## 2022-12-11 PROCEDURE — 63600175 PHARM REV CODE 636 W HCPCS: Performed by: EMERGENCY MEDICINE

## 2022-12-11 PROCEDURE — 80053 COMPREHEN METABOLIC PANEL: CPT | Performed by: EMERGENCY MEDICINE

## 2022-12-11 RX ORDER — METRONIDAZOLE 500 MG/100ML
500 INJECTION, SOLUTION INTRAVENOUS
Status: DISCONTINUED | OUTPATIENT
Start: 2022-12-11 | End: 2022-12-11

## 2022-12-11 RX ORDER — SODIUM CHLORIDE 0.9 % (FLUSH) 0.9 %
10 SYRINGE (ML) INJECTION
Status: DISCONTINUED | OUTPATIENT
Start: 2022-12-11 | End: 2022-12-14 | Stop reason: HOSPADM

## 2022-12-11 RX ORDER — IBUPROFEN 200 MG
24 TABLET ORAL
Status: DISCONTINUED | OUTPATIENT
Start: 2022-12-11 | End: 2022-12-14 | Stop reason: HOSPADM

## 2022-12-11 RX ORDER — GLUCAGON 1 MG
1 KIT INJECTION
Status: DISCONTINUED | OUTPATIENT
Start: 2022-12-11 | End: 2022-12-14 | Stop reason: HOSPADM

## 2022-12-11 RX ORDER — NALOXONE HCL 0.4 MG/ML
0.02 VIAL (ML) INJECTION
Status: DISCONTINUED | OUTPATIENT
Start: 2022-12-11 | End: 2022-12-14 | Stop reason: HOSPADM

## 2022-12-11 RX ORDER — AMLODIPINE BESYLATE 5 MG/1
5 TABLET ORAL DAILY
Status: DISCONTINUED | OUTPATIENT
Start: 2022-12-12 | End: 2022-12-11

## 2022-12-11 RX ORDER — OXYBUTYNIN CHLORIDE 5 MG/1
5 TABLET ORAL 2 TIMES DAILY
COMMUNITY
Start: 2022-12-01

## 2022-12-11 RX ORDER — ONDANSETRON 2 MG/ML
8 INJECTION INTRAMUSCULAR; INTRAVENOUS
Status: COMPLETED | OUTPATIENT
Start: 2022-12-11 | End: 2022-12-11

## 2022-12-11 RX ORDER — HYDRALAZINE HYDROCHLORIDE 20 MG/ML
10 INJECTION INTRAMUSCULAR; INTRAVENOUS
Status: COMPLETED | OUTPATIENT
Start: 2022-12-11 | End: 2022-12-11

## 2022-12-11 RX ORDER — ACETAMINOPHEN 325 MG/1
650 TABLET ORAL EVERY 4 HOURS PRN
Status: DISCONTINUED | OUTPATIENT
Start: 2022-12-11 | End: 2022-12-14 | Stop reason: HOSPADM

## 2022-12-11 RX ORDER — ONDANSETRON 4 MG/1
4 TABLET, ORALLY DISINTEGRATING ORAL EVERY 8 HOURS PRN
Status: DISCONTINUED | OUTPATIENT
Start: 2022-12-11 | End: 2022-12-14 | Stop reason: HOSPADM

## 2022-12-11 RX ORDER — TALC
6 POWDER (GRAM) TOPICAL NIGHTLY PRN
Status: DISCONTINUED | OUTPATIENT
Start: 2022-12-11 | End: 2022-12-14 | Stop reason: HOSPADM

## 2022-12-11 RX ORDER — SERTRALINE HYDROCHLORIDE 50 MG/1
100 TABLET, FILM COATED ORAL DAILY
Status: DISCONTINUED | OUTPATIENT
Start: 2022-12-12 | End: 2022-12-14 | Stop reason: HOSPADM

## 2022-12-11 RX ORDER — HYDROMORPHONE HYDROCHLORIDE 1 MG/ML
1 INJECTION, SOLUTION INTRAMUSCULAR; INTRAVENOUS; SUBCUTANEOUS
Status: COMPLETED | OUTPATIENT
Start: 2022-12-11 | End: 2022-12-11

## 2022-12-11 RX ORDER — IBUPROFEN 200 MG
16 TABLET ORAL
Status: DISCONTINUED | OUTPATIENT
Start: 2022-12-11 | End: 2022-12-14 | Stop reason: HOSPADM

## 2022-12-11 RX ORDER — SODIUM CHLORIDE 0.9 % (FLUSH) 0.9 %
10 SYRINGE (ML) INJECTION EVERY 8 HOURS
Status: DISCONTINUED | OUTPATIENT
Start: 2022-12-11 | End: 2022-12-11

## 2022-12-11 RX ORDER — AMLODIPINE BESYLATE 5 MG/1
5 TABLET ORAL DAILY
Status: DISCONTINUED | OUTPATIENT
Start: 2022-12-11 | End: 2022-12-14 | Stop reason: HOSPADM

## 2022-12-11 RX ORDER — LANOLIN ALCOHOL/MO/W.PET/CERES
800 CREAM (GRAM) TOPICAL
Status: DISCONTINUED | OUTPATIENT
Start: 2022-12-11 | End: 2022-12-14 | Stop reason: HOSPADM

## 2022-12-11 RX ORDER — CIPROFLOXACIN 2 MG/ML
400 INJECTION, SOLUTION INTRAVENOUS
Status: COMPLETED | OUTPATIENT
Start: 2022-12-11 | End: 2022-12-11

## 2022-12-11 RX ORDER — METOCLOPRAMIDE HYDROCHLORIDE 5 MG/ML
10 INJECTION INTRAMUSCULAR; INTRAVENOUS
Status: COMPLETED | OUTPATIENT
Start: 2022-12-11 | End: 2022-12-11

## 2022-12-11 RX ORDER — AMOXICILLIN 250 MG
1 CAPSULE ORAL DAILY PRN
Status: DISCONTINUED | OUTPATIENT
Start: 2022-12-11 | End: 2022-12-14 | Stop reason: HOSPADM

## 2022-12-11 RX ORDER — PRIMIDONE 50 MG/1
100 TABLET ORAL 3 TIMES DAILY
Status: DISCONTINUED | OUTPATIENT
Start: 2022-12-11 | End: 2022-12-14 | Stop reason: HOSPADM

## 2022-12-11 RX ADMIN — AMLODIPINE BESYLATE 5 MG: 5 TABLET ORAL at 04:12

## 2022-12-11 RX ADMIN — HYDROMORPHONE HYDROCHLORIDE 1 MG: 1 INJECTION, SOLUTION INTRAMUSCULAR; INTRAVENOUS; SUBCUTANEOUS at 02:12

## 2022-12-11 RX ADMIN — ACETAMINOPHEN 650 MG: 325 TABLET ORAL at 08:12

## 2022-12-11 RX ADMIN — HYDROMORPHONE HYDROCHLORIDE 1 MG: 1 INJECTION, SOLUTION INTRAMUSCULAR; INTRAVENOUS; SUBCUTANEOUS at 08:12

## 2022-12-11 RX ADMIN — ONDANSETRON 4 MG: 4 TABLET, ORALLY DISINTEGRATING ORAL at 09:12

## 2022-12-11 RX ADMIN — CIPROFLOXACIN 400 MG: 2 INJECTION, SOLUTION INTRAVENOUS at 04:12

## 2022-12-11 RX ADMIN — PRIMIDONE 100 MG: 50 TABLET ORAL at 08:12

## 2022-12-11 RX ADMIN — HYDRALAZINE HYDROCHLORIDE 10 MG: 20 INJECTION INTRAMUSCULAR; INTRAVENOUS at 08:12

## 2022-12-11 RX ADMIN — PIPERACILLIN AND TAZOBACTAM 4.5 G: 4; .5 INJECTION, POWDER, LYOPHILIZED, FOR SOLUTION INTRAVENOUS; PARENTERAL at 05:12

## 2022-12-11 RX ADMIN — PROMETHAZINE HYDROCHLORIDE 25 MG: 25 INJECTION INTRAMUSCULAR; INTRAVENOUS at 02:12

## 2022-12-11 RX ADMIN — METOCLOPRAMIDE 10 MG: 5 INJECTION, SOLUTION INTRAMUSCULAR; INTRAVENOUS at 10:12

## 2022-12-11 RX ADMIN — ONDANSETRON 8 MG: 2 INJECTION INTRAMUSCULAR; INTRAVENOUS at 08:12

## 2022-12-11 RX ADMIN — SODIUM CHLORIDE 2000 ML: 0.9 INJECTION, SOLUTION INTRAVENOUS at 08:12

## 2022-12-11 RX ADMIN — ONDANSETRON 8 MG: 2 INJECTION INTRAMUSCULAR; INTRAVENOUS at 11:12

## 2022-12-11 NOTE — SUBJECTIVE & OBJECTIVE
Past Medical History:   Diagnosis Date    Dyslipidemia     HTN (hypertension)     Tremor        Past Surgical History:   Procedure Laterality Date    DEEP BRAIN STIMULATOR PLACEMENT Bilateral 1/4/2022    Procedure: INSERTION, DEEP BRAIN STIMULATOR BILATERAL VIM;  Surgeon: Neal Hernández MD;  Location: [facility] OR [address] [place] [address];  Service: Neurosurgery;  Laterality: Bilateral;  BILATERAL (LEFT FIRST, RIGHT SECOND)  INSERTION ELECTRODES FOR DEEP BRAIN STIMULATION IN VENTRALIS INTERMEDIUS/TEDS & SCD/ [facility], ALYSSIA WHITE. CO SURGERY WITH DR JT MITCHELL.    ESOPHAGOGASTRODUODENOSCOPY N/A 5/21/2021    Procedure: EGD (ESOPHAGOGASTRODUODENOSCOPY);  Surgeon: Juan R Watson MD;  Location: [facility] ENDO;  Service: Endoscopy;  Laterality: N/A;  covid test 5/18 [place], instr portal -ml    HAND SURGERY      HYSTERECTOMY      INSERTION OF DEEP BRAIN STIMULATOR GENERATOR Left 1/11/2022    Procedure: INSERTION, PULSE GENERATOR, DEEP BRAIN STIMULATOR;  Surgeon: Neal Hernández MD;  Location: [facility] OR [address] [place] [address];  Service: Neurosurgery;  Laterality: Left;  INSERT PULSE GENERATOR FOR DEEP BRAIN STIMULATION/ TEDS & SCD/ [employer], ALYSSIA WHITE.    PLACEMENT OF FIDUCIAL SCREW INTO SPINE N/A 1/4/2022    Procedure: INSERTION, FIDUCIAL SCREW, SKULL;  Surgeon: Neal Hernández MD;  [facility];  Service: Neurosurgery;  Laterality: N/A;  INSERTION FIDUCIAL SCREWS FOR DEEP BRAIN STIMULATION/ TEDS & ACD/ [employer], ALYSSIA WHITE/ CO SURGERY WITH DR JT MITCHELL       Review of patient's allergies indicates:   Allergen Reactions    Iodine and iodide containing products        No current facility-administered medications on file prior to encounter.     Current Outpatient Medications on File Prior to Encounter   Medication Sig    amLODIPine (NORVASC) 5 MG tablet Take 5 mg by mouth once daily.    gabapentin (NEURONTIN) 300 MG capsule Take 300 mg by mouth 3 (three) times daily.    oxybutynin (DITROPAN) 5 MG Tab Take 5 mg by mouth 2 (two) times daily.     primidone (MYSOLINE) 50 MG Tab Take 2 tablets (100 mg total) by mouth 3 (three) times daily.    sertraline (ZOLOFT) 100 MG tablet Take 100 mg by mouth once daily.    triamcinolone acetonide 0.1% (KENALOG) 0.1 % ointment Apply topically 2 (two) times daily.     Family History       Problem Relation (Age of Onset)    Alzheimer's disease Mother    Cancer Father          Tobacco Use    Smoking status: Former     Packs/day: 2.00     Years: 30.00     Pack years: 60.00     Types: Cigarettes     Start date: 1968     Quit date: 1999     Years since quittin.9    Smokeless tobacco: Never   Substance and Sexual Activity    Alcohol use: Not Currently    Drug use: Never    Sexual activity: Not on file     Comment: divorce     Review of Systems   Constitutional:  Negative for chills and fever.   HENT:  Negative for nosebleeds and tinnitus.    Eyes:  Negative for photophobia and visual disturbance.   Respiratory:  Negative for shortness of breath and wheezing.    Cardiovascular:  Negative for chest pain, palpitations and leg swelling.   Gastrointestinal:  Positive for abdominal pain, nausea and vomiting. Negative for abdominal distention.   Genitourinary:  Negative for dysuria, flank pain and hematuria.   Musculoskeletal:  Negative for gait problem and joint swelling.   Skin:  Negative for rash and wound.   Neurological:  Negative for seizures and syncope.   Objective:     Vital Signs (Most Recent):  Temp: 98.2 °F (36.8 °C) (22 0750)  Pulse: 94 (22 1432)  Resp: 12 (22 1432)  BP: (!) 159/74 (22 1432)  SpO2: 95 % (22 1320)   Vital Signs (24h Range):  Temp:  [98.2 °F (36.8 °C)] 98.2 °F (36.8 °C)  Pulse:  [63-94] 94  Resp:  [12-22] 12  SpO2:  [94 %-96 %] 95 %  BP: (132-202)/() 159/74     Weight: 68 kg (150 lb)  Body mass index is 24.21 kg/m².    Physical Exam  Vitals and nursing note reviewed.   Constitutional:       General: She is not in acute distress.     Appearance: She is  well-developed. She is not diaphoretic.   HENT:      Head: Normocephalic and atraumatic.      Right Ear: External ear normal.      Left Ear: External ear normal.   Eyes:      General:         Right eye: No discharge.         Left eye: No discharge.      Conjunctiva/sclera: Conjunctivae normal.   Neck:      Thyroid: No thyromegaly.   Cardiovascular:      Rate and Rhythm: Normal rate and regular rhythm.      Heart sounds: No murmur heard.  Pulmonary:      Effort: Pulmonary effort is normal. No respiratory distress.      Breath sounds: Normal breath sounds.   Abdominal:      General: Bowel sounds are normal. There is no distension.      Palpations: Abdomen is soft. There is no mass.      Tenderness: There is abdominal tenderness (RUQ and epigastric tenderness).   Musculoskeletal:         General: No deformity.      Cervical back: Normal range of motion and neck supple.      Right lower leg: No edema.      Left lower leg: No edema.   Skin:     General: Skin is warm and dry.   Neurological:      Mental Status: She is alert and oriented to person, place, and time.      Sensory: No sensory deficit.   Psychiatric:         Mood and Affect: Mood normal.         Behavior: Behavior normal.           Significant Labs: CBC:   Recent Labs   Lab 12/11/22  0826   WBC 9.28   HGB 14.5   HCT 41.8        CMP:   Recent Labs   Lab 12/11/22  0826      K 4.0      CO2 24   *   BUN 15   CREATININE 0.7   CALCIUM 8.9   PROT 7.6   ALBUMIN 3.7   BILITOT 0.2   ALKPHOS 100   AST 23   ALT 15   ANIONGAP 13     Cardiac Markers:   Recent Labs   Lab 12/11/22  0826   BNP 61     Coagulation: No results for input(s): PT, INR, APTT in the last 48 hours.  Lactic Acid:   Recent Labs   Lab 12/11/22  0826   LACTATE 1.4     Troponin:   Recent Labs   Lab 12/11/22  0826   TROPONINI <0.006     Urine Studies:   Recent Labs   Lab 12/11/22  1008   COLORU Colorless*   APPEARANCEUA Clear   PHUR 8.0   SPECGRAV 1.010   PROTEINUA Negative   GLUCUA  Negative   KETONESU Negative   BILIRUBINUA Negative   OCCULTUA Negative   NITRITE Negative   UROBILINOGEN Negative   LEUKOCYTESUR 1+*   RBCUA 1   WBCUA 2       Significant Imaging:   Imaging Results              US Abdomen Limited (In process)                      NM Lung Scan Ventilation Perfusion (Final result)  Result time 12/11/22 15:35:08      Final result by Leonardo Arora MD (12/11/22 15:35:08)                   Impression:      This represents a low probability of pulmonary embolism.      Electronically signed by: Leonardo Arora MD  Date:    12/11/2022  Time:    15:35               Narrative:    EXAMINATION:  NM LUNG VENTILATION AND PERFUSION IMAGING    CLINICAL HISTORY:  Pulmonary embolism (PE) suspected, positive D-dimer;    TECHNIQUE:  20.5 mCi of xenon 133 were placed in the nebulizer. Following the inhalation Tc-99m-DTPA in aerosol and the subsequent IV administration of 5.8 mCi of Tc-99m-MAA, multiple images of the thorax were obtained in various projections.    COMPARISON:  Chest x-ray dated 12/11/2022.    FINDINGS:  There is normal ventilation of lung fields.  There is no significant retained radiotracer activity is identified in the delayed images.    No perfusion abnormalities identified.  There is expected area of photopenia involving the left lung base correspond to prominent cardiac silhouette.                                       CT Chest Abdomen Pelvis Without Contrast (XPD) (Final result)  Result time 12/11/22 12:11:36      Final result by Larisa Blanchard MD (12/11/22 12:11:36)                   Impression:      1. CT findings suggestive of acute cholecystitis, including cholelithiasis, distended gallbladder and possible mild surrounding inflammatory stranding.  Right upper quadrant ultrasound could be considered for further evaluation.  2. Bilateral dependent lower lobe traction bronchiectasis and airspace disease.  This suggests an ongoing process, such as recurrent aspiration, although an  acute abnormality such as infection or recent aspiration are not excluded.  3. Tiny bilateral pulmonary nodules.  The largest of these measures 6 mm.  A CT in 6 months is recommended.      Electronically signed by: Larisa Blanchard  Date:    12/11/2022  Time:    12:11               Narrative:    EXAMINATION:  CT CHEST ABDOMEN PELVIS WITHOUT CONTRAST(XPD)    CLINICAL HISTORY:  intractable vomiting, has Deep brain stimulator, cxr ? pna;    TECHNIQUE:  Low dose axial images, sagittal and coronal reformations were obtained from the thoracic inlet to the pubic symphysis    COMPARISON:  Chest radiograph performed earlier today    FINDINGS:  CHEST    Support tubes and lines: None.    Aorta: Moderately calcified but normal caliber    Heart: Normal size..    Coronary arteries: No calcifications.    Pericardium: Normal. No effusion, thickening, or calcification.    Central pulmonary arteries: Normal caliber.    Base of neck/thyroid: Normal.    Lymph nodes: No supraclavicular, axillary, internal mammary, mediastinal or hilar lymphadenopathy.    Esophagus: Normal.    Pleura: No effusion, thickening or calcification.    Body wall: Bilateral breast implants the left implant appears collapsed.    Airways: Traction bronchiectasis is present primarily in the lower lobes.  Diffuse bronchial wall thickening is present.    Lungs: Emphysema is present.  Bibasilar dependent lower lobe airspace disease is present.a 4 mm nodule is present in the right upper lobe (series 4, image 235). A 6 mm nodule is present in the left upper lobe (series 4, image 338). A 5 mm nodule is present in the left lower lobe (series 4, image 340).  Additional small nodules are present bilaterally.    Bones: Unremarkable.    ABDOMEN/PELVIS    Liver: Unremarkable.    Gallbladder/bile ducts: Cholelithiasis is present.  The gallbladder is distended over 5 cm.  Possible mild surrounding inflammatory stranding.  No intra or extrahepatic biliary ductal  dilatation.    Pancreas: Unremarkable.    Spleen: Unremarkable.    Adrenals: Unremarkable.    Kidneys: A tiny nonobstructing right renal stone is present.  An 8 mm low-attenuation lesion in the inferior pole of the right kidney is too small to definitively characterize.    Lymph nodes: No abdominal or pelvic lymphadenopathy.    Bowel and mesentery: Unremarkable.    Abdominal aorta: Heavily calcified but normal caliber    Inferior vena cava: Unremarkable.    Free fluid or free air: None.    Pelvis: The bladder is mildly distended.    Body wall: Unremarkable.    Bones: Unremarkable.                                       CT Head Without Contrast (Final result)  Result time 12/11/22 12:19:48      Final result by Gavin Light MD (12/11/22 12:19:48)                   Impression:      1. Allowing for motion artifact, no convincing acute intracranial abnormalities noting sequela of chronic microvascular ischemic change and senescent change.  2. Stable positioning of deep brain stimulator leads.      Electronically signed by: Gavin Light MD  Date:    12/11/2022  Time:    12:19               Narrative:    EXAMINATION:  CT HEAD WITHOUT CONTRAST    CLINICAL HISTORY:  intractable vomiting;    TECHNIQUE:  Low dose axial images were obtained through the head.  Coronal and sagittal reformations were also performed. Contrast was not administered.    COMPARISON:  01/04/2022    FINDINGS:  There is motion artifact.    There is generalized cerebral volume loss.  There is hypoattenuation in a periventricular fashion, likely sequela of chronic microvascular ischemic change.Bilateral deep brain stimulator leads are in stable position.  There is no evidence of acute major vascular territory infarct, hemorrhage, or mass.  There is no hydrocephalus.  There are no abnormal extra-axial fluid collections.  The paranasal sinuses and mastoid air cells are clear, and there is no evidence of calvarial fracture.  The visualized soft tissues  are unremarkable.                                       X-Ray Chest AP Portable (Final result)  Result time 12/11/22 10:59:32      Final result by Larisa Blanchard MD (12/11/22 10:59:32)                   Impression:      Bibasilar airspace disease, that could be secondary to atelectasis, aspiration and/or pneumonia.    Possible small left pleural effusion.      Electronically signed by: Larisa Blanchard  Date:    12/11/2022  Time:    10:59               Narrative:    EXAMINATION:  XR CHEST AP PORTABLE    CLINICAL HISTORY:  Essential (primary) hypertension    TECHNIQUE:  Single frontal view of the chest was performed.    COMPARISON:  12/06/2021    FINDINGS:  Bibasilar airspace disease, right greater than left, is present.  Possible small left pleural effusio.  Heart size is unchanged.  A device overlies the left chest that is new when compared to prior imaging.

## 2022-12-11 NOTE — ASSESSMENT & PLAN NOTE
Presents with abdominal pain nausea and vomiting. CT abdomen with findings suggestive of acute cholecystitis and cholelithiasis, also with bilateral lower love bronchiectasis and airspace disease.   Discussed with general surgery by ED recommending US abdomen  Will transition to zosyn from cipro/flagyl should lung findings on CT relate to aspiration from vomiting  NPO

## 2022-12-11 NOTE — PLAN OF CARE
[facility] - Emergency Dept  Discharge Assessment    SW completed brief assessment and discussed discharge planning with patient  by her bedside. Patient stated that her son Alok lives with her and he is his main support. Patient's son will provide transportation for her to get home when discharge from the hospital.    Primary Care Provider: David Olson MD     Discharge Assessment (most recent)       BRIEF DISCHARGE ASSESSMENT - 12/11/22 5475          Discharge Planning    Assessment Type Discharge Planning Brief Assessment     Resource/Environmental Concerns none     Support Systems Children     Equipment Currently Used at Home none     Current Living Arrangements home     Patient/Family Anticipates Transition to home     Patient/Family Anticipated Services at Transition none     DME Needed Upon Discharge  none     Discharge Plan A Home     Discharge Plan B Home

## 2022-12-11 NOTE — H&P
"[facility]  History & Physical    Patient Name: Elsa Steel  MRN: [id number]  Patient Class: OP- Observation  Admission Date: 12/11/2022  Attending Physician: Ellen Ruiz MD   Primary Care Provider: David Olson MD         Patient information was obtained from patient, past medical records and ER records.     Subjective:     Principal Problem:Abdominal pain    Chief Complaint:   Chief Complaint   Patient presents with    Hypertension    Chest Pain    Nausea     Pt c/o hypertension, epigastric/chest pain accompanied by SOB and nausea. Pt states she feels "my lungs collapse when I take a deep breath." Denies v/d.        HPI: Elsa Steel 72 y.o. female with essential tremor, HTN, presents to the hospital with a chief complaint of abdominal pain.  She reports sudden onset epigastric abdominal pain with radiation to her chest began this moaning with associated nausea and vomiting.  She has not attempted any treatment home.  She denies any aggravating alleviating factors.  She describes as constant stabbing pain.  She reports this never occurred before.  She denies fever shortness of breath leg swelling syncope dizziness dysuria melena hematuria hematemesis.  Her last bowel movement was this morning.  She does not take any blood thinners.    In the ED, afebrile without leukocytosis lactic acid negative BNP negative troponin negative creatinine 0.7 the scan Q scan low probability of pulmonary embolism CT head without acute intracranial abnormalities stable positioning of deep brain stimulator CT chest abdomen pelvis without contrast suggestive of acute cholecystitis including cholelithiasis bilateral dependent lower traction bronchiectasis in airspace disease.      Past Medical History:   Diagnosis Date    Dyslipidemia     HTN (hypertension)     Tremor        Past Surgical History:   Procedure Laterality Date    DEEP BRAIN STIMULATOR PLACEMENT Bilateral 1/4/2022    Procedure: " INSERTION, DEEP BRAIN STIMULATOR BILATERAL VIM;  Surgeon: Neal Hernández MD;  Location: [facility] OR 2ND FLR;  Service: Neurosurgery;  Laterality: Bilateral;  BILATERAL (LEFT FIRST, RIGHT SECOND)  INSERTION ELECTRODES FOR DEEP BRAIN STIMULATION IN VENTRALIS INTERMEDIUS/TEDS & SCD/ [employer], ALYSSIA WHITE. CO SURGERY WITH DR JT MITCHELL.    ESOPHAGOGASTRODUODENOSCOPY N/A 5/21/2021    Procedure: EGD (ESOPHAGOGASTRODUODENOSCOPY);  Surgeon: Juan R Watson MD;  Location: [facility];  Service: Endoscopy;  Laterality: N/A;  covid test 5/18 [place], instr portal -ml    HAND SURGERY      HYSTERECTOMY      INSERTION OF DEEP BRAIN STIMULATOR GENERATOR Left 1/11/2022    Procedure: INSERTION, PULSE GENERATOR, DEEP BRAIN STIMULATOR;  Surgeon: Neal Hernández MD;  [facility];  Service: Neurosurgery;  Laterality: Left;  INSERT PULSE GENERATOR FOR DEEP BRAIN STIMULATION/ TEDS & SCD/ [employer]TR[employer]ICS, ALYSSIA WHITE.    PLACEMENT OF FIDUCIAL SCREW INTO SPINE N/A 1/4/2022    Procedure: INSERTION, FIDUCIAL SCREW, SKULL;  Surgeon: Neal Hernández MD;  [facility];  Service: Neurosurgery;  Laterality: N/A;  INSERTION FIDUCIAL SCREWS FOR DEEP BRAIN STIMULATION/ TEDS & ACD/ [employer]TRONICS, ALYSSIA LANDRY/ CO SURGERY WITH DR JT MITCHELL       Review of patient's allergies indicates:   Allergen Reactions    Iodine and iodide containing products        No current facility-administered medications on file prior to encounter.     Current Outpatient Medications on File Prior to Encounter   Medication Sig    amLODIPine (NORVASC) 5 MG tablet Take 5 mg by mouth once daily.    gabapentin (NEURONTIN) 300 MG capsule Take 300 mg by mouth 3 (three) times daily.    oxybutynin (DITROPAN) 5 MG Tab Take 5 mg by mouth 2 (two) times daily.    primidone (MYSOLINE) 50 MG Tab Take 2 tablets (100 mg total) by mouth 3 (three) times daily.    sertraline (ZOLOFT) 100 MG tablet Take 100 mg by mouth once daily.    triamcinolone acetonide 0.1% (KENALOG) 0.1 %  ointment Apply topically 2 (two) times daily.     Family History       Problem Relation (Age of Onset)    Alzheimer's disease Mother    Cancer Father          Tobacco Use    Smoking status: Former     Packs/day: 2.00     Years: 30.00     Pack years: 60.00     Types: Cigarettes     Start date: 1968     Quit date: 1999     Years since quittin.9    Smokeless tobacco: Never   Substance and Sexual Activity    Alcohol use: Not Currently    Drug use: Never    Sexual activity: Not on file     Comment: divorce     Review of Systems   Constitutional:  Negative for chills and fever.   HENT:  Negative for nosebleeds and tinnitus.    Eyes:  Negative for photophobia and visual disturbance.   Respiratory:  Negative for shortness of breath and wheezing.    Cardiovascular:  Negative for chest pain, palpitations and leg swelling.   Gastrointestinal:  Positive for abdominal pain, nausea and vomiting. Negative for abdominal distention.   Genitourinary:  Negative for dysuria, flank pain and hematuria.   Musculoskeletal:  Negative for gait problem and joint swelling.   Skin:  Negative for rash and wound.   Neurological:  Negative for seizures and syncope.   Objective:     Vital Signs (Most Recent):  Temp: 98.2 °F (36.8 °C) (22 0750)  Pulse: 94 (22 1432)  Resp: 12 (22 1432)  BP: (!) 159/74 (22 1432)  SpO2: 95 % (22 1320)   Vital Signs (24h Range):  Temp:  [98.2 °F (36.8 °C)] 98.2 °F (36.8 °C)  Pulse:  [63-94] 94  Resp:  [12-22] 12  SpO2:  [94 %-96 %] 95 %  BP: (132-202)/() 159/74     Weight: 68 kg (150 lb)  Body mass index is 24.21 kg/m².    Physical Exam  Vitals and nursing note reviewed.   Constitutional:       General: She is not in acute distress.     Appearance: She is well-developed. She is not diaphoretic.   HENT:      Head: Normocephalic and atraumatic.      Right Ear: External ear normal.      Left Ear: External ear normal.   Eyes:      General:         Right eye: No discharge.          Left eye: No discharge.      Conjunctiva/sclera: Conjunctivae normal.   Neck:      Thyroid: No thyromegaly.   Cardiovascular:      Rate and Rhythm: Normal rate and regular rhythm.      Heart sounds: No murmur heard.  Pulmonary:      Effort: Pulmonary effort is normal. No respiratory distress.      Breath sounds: Normal breath sounds.   Abdominal:      General: Bowel sounds are normal. There is no distension.      Palpations: Abdomen is soft. There is no mass.      Tenderness: There is abdominal tenderness (RUQ and epigastric tenderness).   Musculoskeletal:         General: No deformity.      Cervical back: Normal range of motion and neck supple.      Right lower leg: No edema.      Left lower leg: No edema.   Skin:     General: Skin is warm and dry.   Neurological:      Mental Status: She is alert and oriented to person, place, and time.      Sensory: No sensory deficit.   Psychiatric:         Mood and Affect: Mood normal.         Behavior: Behavior normal.           Significant Labs: CBC:   Recent Labs   Lab 12/11/22  0826   WBC 9.28   HGB 14.5   HCT 41.8        CMP:   Recent Labs   Lab 12/11/22  0826      K 4.0      CO2 24   *   BUN 15   CREATININE 0.7   CALCIUM 8.9   PROT 7.6   ALBUMIN 3.7   BILITOT 0.2   ALKPHOS 100   AST 23   ALT 15   ANIONGAP 13     Cardiac Markers:   Recent Labs   Lab 12/11/22  0826   BNP 61     Coagulation: No results for input(s): PT, INR, APTT in the last 48 hours.  Lactic Acid:   Recent Labs   Lab 12/11/22  0826   LACTATE 1.4     Troponin:   Recent Labs   Lab 12/11/22  0826   TROPONINI <0.006     Urine Studies:   Recent Labs   Lab 12/11/22  1008   COLORU Colorless*   APPEARANCEUA Clear   PHUR 8.0   SPECGRAV 1.010   PROTEINUA Negative   GLUCUA Negative   KETONESU Negative   BILIRUBINUA Negative   OCCULTUA Negative   NITRITE Negative   UROBILINOGEN Negative   LEUKOCYTESUR 1+*   RBCUA 1   WBCUA 2       Significant Imaging:   Imaging Results              US  Abdomen Limited (In process)                      NM Lung Scan Ventilation Perfusion (Final result)  Result time 12/11/22 15:35:08      Final result by Leonardo Arora MD (12/11/22 15:35:08)                   Impression:      This represents a low probability of pulmonary embolism.      Electronically signed by: Leonardo Arora MD  Date:    12/11/2022  Time:    15:35               Narrative:    EXAMINATION:  NM LUNG VENTILATION AND PERFUSION IMAGING    CLINICAL HISTORY:  Pulmonary embolism (PE) suspected, positive D-dimer;    TECHNIQUE:  20.5 mCi of xenon 133 were placed in the nebulizer. Following the inhalation Tc-99m-DTPA in aerosol and the subsequent IV administration of 5.8 mCi of Tc-99m-MAA, multiple images of the thorax were obtained in various projections.    COMPARISON:  Chest x-ray dated 12/11/2022.    FINDINGS:  There is normal ventilation of lung fields.  There is no significant retained radiotracer activity is identified in the delayed images.    No perfusion abnormalities identified.  There is expected area of photopenia involving the left lung base correspond to prominent cardiac silhouette.                                       CT Chest Abdomen Pelvis Without Contrast (XPD) (Final result)  Result time 12/11/22 12:11:36      Final result by Larisa Blanchard MD (12/11/22 12:11:36)                   Impression:      1. CT findings suggestive of acute cholecystitis, including cholelithiasis, distended gallbladder and possible mild surrounding inflammatory stranding.  Right upper quadrant ultrasound could be considered for further evaluation.  2. Bilateral dependent lower lobe traction bronchiectasis and airspace disease.  This suggests an ongoing process, such as recurrent aspiration, although an acute abnormality such as infection or recent aspiration are not excluded.  3. Tiny bilateral pulmonary nodules.  The largest of these measures 6 mm.  A CT in 6 months is recommended.      Electronically signed  by: Larisa Blanchard  Date:    12/11/2022  Time:    12:11               Narrative:    EXAMINATION:  CT CHEST ABDOMEN PELVIS WITHOUT CONTRAST(XPD)    CLINICAL HISTORY:  intractable vomiting, has Deep brain stimulator, cxr ? pna;    TECHNIQUE:  Low dose axial images, sagittal and coronal reformations were obtained from the thoracic inlet to the pubic symphysis    COMPARISON:  Chest radiograph performed earlier today    FINDINGS:  CHEST    Support tubes and lines: None.    Aorta: Moderately calcified but normal caliber    Heart: Normal size..    Coronary arteries: No calcifications.    Pericardium: Normal. No effusion, thickening, or calcification.    Central pulmonary arteries: Normal caliber.    Base of neck/thyroid: Normal.    Lymph nodes: No supraclavicular, axillary, internal mammary, mediastinal or hilar lymphadenopathy.    Esophagus: Normal.    Pleura: No effusion, thickening or calcification.    Body wall: Bilateral breast implants the left implant appears collapsed.    Airways: Traction bronchiectasis is present primarily in the lower lobes.  Diffuse bronchial wall thickening is present.    Lungs: Emphysema is present.  Bibasilar dependent lower lobe airspace disease is present.a 4 mm nodule is present in the right upper lobe (series 4, image 235). A 6 mm nodule is present in the left upper lobe (series 4, image 338). A 5 mm nodule is present in the left lower lobe (series 4, image 340).  Additional small nodules are present bilaterally.    Bones: Unremarkable.    ABDOMEN/PELVIS    Liver: Unremarkable.    Gallbladder/bile ducts: Cholelithiasis is present.  The gallbladder is distended over 5 cm.  Possible mild surrounding inflammatory stranding.  No intra or extrahepatic biliary ductal dilatation.    Pancreas: Unremarkable.    Spleen: Unremarkable.    Adrenals: Unremarkable.    Kidneys: A tiny nonobstructing right renal stone is present.  An 8 mm low-attenuation lesion in the inferior pole of the right kidney  is too small to definitively characterize.    Lymph nodes: No abdominal or pelvic lymphadenopathy.    Bowel and mesentery: Unremarkable.    Abdominal aorta: Heavily calcified but normal caliber    Inferior vena cava: Unremarkable.    Free fluid or free air: None.    Pelvis: The bladder is mildly distended.    Body wall: Unremarkable.    Bones: Unremarkable.                                       CT Head Without Contrast (Final result)  Result time 12/11/22 12:19:48      Final result by Gavin Light MD (12/11/22 12:19:48)                   Impression:      1. Allowing for motion artifact, no convincing acute intracranial abnormalities noting sequela of chronic microvascular ischemic change and senescent change.  2. Stable positioning of deep brain stimulator leads.      Electronically signed by: Gavin Light MD  Date:    12/11/2022  Time:    12:19               Narrative:    EXAMINATION:  CT HEAD WITHOUT CONTRAST    CLINICAL HISTORY:  intractable vomiting;    TECHNIQUE:  Low dose axial images were obtained through the head.  Coronal and sagittal reformations were also performed. Contrast was not administered.    COMPARISON:  01/04/2022    FINDINGS:  There is motion artifact.    There is generalized cerebral volume loss.  There is hypoattenuation in a periventricular fashion, likely sequela of chronic microvascular ischemic change.Bilateral deep brain stimulator leads are in stable position.  There is no evidence of acute major vascular territory infarct, hemorrhage, or mass.  There is no hydrocephalus.  There are no abnormal extra-axial fluid collections.  The paranasal sinuses and mastoid air cells are clear, and there is no evidence of calvarial fracture.  The visualized soft tissues are unremarkable.                                       X-Ray Chest AP Portable (Final result)  Result time 12/11/22 10:59:32      Final result by Larisa Blanchard MD (12/11/22 10:59:32)                   Impression:       Bibasilar airspace disease, that could be secondary to atelectasis, aspiration and/or pneumonia.    Possible small left pleural effusion.      Electronically signed by: Larisa Blanchard  Date:    12/11/2022  Time:    10:59               Narrative:    EXAMINATION:  XR CHEST AP PORTABLE    CLINICAL HISTORY:  Essential (primary) hypertension    TECHNIQUE:  Single frontal view of the chest was performed.    COMPARISON:  12/06/2021    FINDINGS:  Bibasilar airspace disease, right greater than left, is present.  Possible small left pleural effusio.  Heart size is unchanged.  A device overlies the left chest that is new when compared to prior imaging.                                        Assessment/Plan:     * Abdominal pain  Presents with abdominal pain nausea and vomiting. CT abdomen with findings suggestive of acute cholecystitis and cholelithiasis, also with bilateral lower love bronchiectasis and airspace disease.   Discussed with general surgery by ED recommending US abdomen  Will transition to zosyn from cipro/flagyl should lung findings on CT relate to aspiration from vomiting  NPO    HTN (hypertension)  Continue home amlodipine    Pulmonary nodules  Seen on CT abdomen. Will recommend continued outpatient follow up.     Depression  Continue home sertraline.     Essential tremor  Continue home primidone      VTE Risk Mitigation (From admission, onward)           Ordered     IP VTE HIGH RISK PATIENT  Once         12/11/22 1549     Place sequential compression device  Until discontinued         12/11/22 1549     Place DEANNA hose  Until discontinued         12/11/22 1549                  VTE: DEANNA/SCD  Code: Full  Diet: NPO  Dispo: pending surgery eval and US abdomen  As clarification, on 12/11/2022, patient should be admitted for hospital observation services under my care in collaboration with Ellen Ruiz MD. uLis Chavez PA-C     ADDENDUM repeat troponin 0.046. will repeat troponin and obtain Echo. She denies  chest pain    Luis Chavez PA-C  Department of Hospital Medicine   [facility] - [place] - Emergency Dept

## 2022-12-11 NOTE — NURSING
Patient has arrived on the unit, Awake and alert, via bed. Ambulated to bathroom with standby assistance and back to bed. On 2L nc no distress noted. Oriented to room, situated in bed. Call light placed in hand. Bedside commode placed at bedside. IV antibiotics continued. Bed in lowest position, wheels locked, bed alarm set. Instructed to call for assistance.

## 2022-12-11 NOTE — PHARMACY MED REC
"Admission Medication History     The home medication history was taken by Shahla Avery.    You may go to "Admission" then "Reconcile Home Medications" tabs to review and/or act upon these items.     The home medication list has been updated by the Pharmacy department.   Please read ALL comments highlighted in yellow.   Please address this information as you see fit.    Feel free to contact us if you have any questions or require assistance.    Medications listed below were obtained from: Patient/family and Analytic software- [employer] and added to home medication:   Oxybutynin   Primidone    The medication reconciliation was completed by the patient's bedside.      Shahla Avery  [phone]                    .        "

## 2022-12-11 NOTE — HPI
Elsa Steel 72 y.o. female with essential tremor, HTN, presents to the hospital with a chief complaint of abdominal pain.  She reports sudden onset epigastric abdominal pain with radiation to her chest began this moaning with associated nausea and vomiting.  She has not attempted any treatment home.  She denies any aggravating alleviating factors.  She describes as constant stabbing pain.  She reports this never occurred before.  She denies fever shortness of breath leg swelling syncope dizziness dysuria melena hematuria hematemesis.  Her last bowel movement was this morning.  She does not take any blood thinners.    In the ED, afebrile without leukocytosis lactic acid negative BNP negative troponin negative creatinine 0.7 the scan Q scan low probability of pulmonary embolism CT head without acute intracranial abnormalities stable positioning of deep brain stimulator CT chest abdomen pelvis without contrast suggestive of acute cholecystitis including cholelithiasis bilateral dependent lower traction bronchiectasis in airspace disease.

## 2022-12-11 NOTE — ED PROVIDER NOTES
"Encounter Date: 12/11/2022       History     Chief Complaint   Patient presents with    Hypertension    Chest Pain    Nausea     Pt c/o hypertension, epigastric/chest pain accompanied by SOB and nausea. Pt states she feels "my lungs collapse when I take a deep breath." Denies v/d.     72 y.o. female Past Medical History:  No date: Dyslipidemia  No date: HTN (hypertension)  No date: Tremor     Deep brain stimulator for tremors ,Notes awakened approx 4AM with L upper/lower abd pain, not radiating, nausea and sensation of air hunger described as "feels like my lung is collapsing when I breathe". Was unable to tolerate meds this morning. On arrival endorses b/l upper abd pain, nausea and vomiting.        3/10/21 Echo  Summary    · The left ventricle is normal in size with normal systolic function.  · The estimated ejection fraction is 55%.  · Normal left ventricular diastolic function.  · Normal right ventricular size with normal right ventricular systolic function.  · Mild mitral regurgitation.  · Intermediate central venous pressure (8 mmHg).  · The estimated PA systolic pressure is 38 mmHg.  · Trivial posterior pericardial effusion.  · There were no arrhythmias during stress.  · The ECG portion of this study is negative for myocardial ischemia.  · The stress echo portion of this study is negative for myocardial ischemia.  · Only 79% of maximum predicted heart rate achieved. Consider repeating as a pharmacological nuclear stress test         Review of patient's allergies indicates:   Allergen Reactions    Iodine and iodide containing products      Past Medical History:   Diagnosis Date    Dyslipidemia     HTN (hypertension)     Tremor      Past Surgical History:   Procedure Laterality Date    DEEP BRAIN STIMULATOR PLACEMENT Bilateral 1/4/2022    Procedure: INSERTION, DEEP BRAIN STIMULATOR BILATERAL VIM;  Surgeon: Neal Hernández MD;  Location: [facility] OR [address] [place] [address];  Service: Neurosurgery;  Laterality: Bilateral;  BILATERAL " (LEFT FIRST, RIGHT SECOND)  INSERTION ELECTRODES FOR DEEP BRAIN STIMULATION IN VENTRALIS INTERMEDIUS/TEDS & SCD/ MEDTRONICS, ALYSSIA WHITE. CO SURGERY WITH DR JT MITCHELL.    ESOPHAGOGASTRODUODENOSCOPY N/A 2021    Procedure: EGD (ESOPHAGOGASTRODUODENOSCOPY);  Surgeon: Juan R Watson MD;  Location: [facility];  Service: Endoscopy;  Laterality: N/A;  covid test  [place], instr portal -ml    HAND SURGERY      HYSTERECTOMY      INSERTION OF DEEP BRAIN STIMULATOR GENERATOR Left 2022    Procedure: INSERTION, PULSE GENERATOR, DEEP BRAIN STIMULATOR;  Surgeon: Neal Hernández MD;  Location: [facility] OR 2ND FLR;  Service: Neurosurgery;  Laterality: Left;  INSERT PULSE GENERATOR FOR DEEP BRAIN STIMULATION/ TEDS & SCD/ MEDTRONICS, ALYSSIA WHITE.    PLACEMENT OF FIDUCIAL SCREW INTO SPINE N/A 2022    Procedure: INSERTION, FIDUCIAL SCREW, SKULL;  Surgeon: Neal Hernández MD;  Location: [facility] OR 2ND FLR;  Service: Neurosurgery;  Laterality: N/A;  INSERTION FIDUCIAL SCREWS FOR DEEP BRAIN STIMULATION/ TEDS & ACD/ MEDTRONICS, ALYSSIA FRANTZ/ CO SURGERY WITH DR JT MITCHELL     Family History   Problem Relation Age of Onset    Alzheimer's disease Mother     Cancer Father      Social History     Tobacco Use    Smoking status: Former     Packs/day: 2.00     Years: 30.00     Pack years: 60.00     Types: Cigarettes     Start date: 1968     Quit date: 1999     Years since quittin.9    Smokeless tobacco: Never   Substance Use Topics    Alcohol use: Not Currently    Drug use: Never     Review of Systems   Constitutional:  Negative for fever.   HENT:  Negative for sore throat.    Respiratory:  Negative for shortness of breath.    Cardiovascular:  Negative for chest pain.   Gastrointestinal:  Positive for abdominal pain, nausea and vomiting. Negative for diarrhea.   Genitourinary:  Negative for dysuria.   Musculoskeletal:  Negative for back pain.   Skin:  Negative for rash.   Neurological:  Negative for weakness.    Hematological:  Does not bruise/bleed easily.   All other systems reviewed and are negative.    Physical Exam     Initial Vitals [12/11/22 0750]   BP Pulse Resp Temp SpO2   (!) 197/108 77 15 98.2 °F (36.8 °C) 96 %      MAP       --         Physical Exam    Nursing note and vitals reviewed.  Constitutional: She appears well-developed and well-nourished.   HENT:   Head: Normocephalic and atraumatic.   Eyes: Conjunctivae and EOM are normal. Pupils are equal, round, and reactive to light.   Neck:   Normal range of motion.  Cardiovascular:  Normal rate.           Pulmonary/Chest: No respiratory distress.   Abdominal: Abdomen is soft. She exhibits no distension. There is no rebound and no guarding.   Musculoskeletal:         General: Normal range of motion.      Cervical back: Normal range of motion.     Neurological: She is alert. No cranial nerve deficit. GCS score is 15. GCS eye subscore is 4. GCS verbal subscore is 5. GCS motor subscore is 6.   Skin: Skin is warm and dry.   Psychiatric: She has a normal mood and affect. Thought content normal.   Almeida's equivocal, mildly ttp but not rigid, no g/r  No LE pitting, no jvd  ED Course   Procedures  Labs Reviewed   CBC W/ AUTO DIFFERENTIAL - Abnormal; Notable for the following components:       Result Value    MCV 79 (*)     All other components within normal limits   COMPREHENSIVE METABOLIC PANEL - Abnormal; Notable for the following components:    Glucose 120 (*)     All other components within normal limits   D DIMER, QUANTITATIVE - Abnormal; Notable for the following components:    D-Dimer 1.80 (*)     All other components within normal limits   URINALYSIS, REFLEX TO URINE CULTURE - Abnormal; Notable for the following components:    Color, UA Colorless (*)     Leukocytes, UA 1+ (*)     All other components within normal limits    Narrative:     Specimen Source->Urine   CULTURE, BLOOD   CULTURE, BLOOD   LIPASE   LACTIC ACID, PLASMA   TROPONIN I   B-TYPE NATRIURETIC  PEPTIDE   URINALYSIS MICROSCOPIC    Narrative:     Specimen Source->Urine   HEPATIC FUNCTION PANEL   LIPID PANEL   LIPID PANEL   TROPONIN I          Imaging Results              US Abdomen Limited (In process)                      NM Lung Scan Ventilation Perfusion (Final result)  Result time 12/11/22 15:35:08      Final result by Leonardo Arora MD (12/11/22 15:35:08)                   Impression:      This represents a low probability of pulmonary embolism.      Electronically signed by: Leonardo Arora MD  Date:    12/11/2022  Time:    15:35               Narrative:    EXAMINATION:  NM LUNG VENTILATION AND PERFUSION IMAGING    CLINICAL HISTORY:  Pulmonary embolism (PE) suspected, positive D-dimer;    TECHNIQUE:  20.5 mCi of xenon 133 were placed in the nebulizer. Following the inhalation Tc-99m-DTPA in aerosol and the subsequent IV administration of 5.8 mCi of Tc-99m-MAA, multiple images of the thorax were obtained in various projections.    COMPARISON:  Chest x-ray dated 12/11/2022.    FINDINGS:  There is normal ventilation of lung fields.  There is no significant retained radiotracer activity is identified in the delayed images.    No perfusion abnormalities identified.  There is expected area of photopenia involving the left lung base correspond to prominent cardiac silhouette.                                       CT Chest Abdomen Pelvis Without Contrast (XPD) (Final result)  Result time 12/11/22 12:11:36      Final result by Larisa Blanchard MD (12/11/22 12:11:36)                   Impression:      1. CT findings suggestive of acute cholecystitis, including cholelithiasis, distended gallbladder and possible mild surrounding inflammatory stranding.  Right upper quadrant ultrasound could be considered for further evaluation.  2. Bilateral dependent lower lobe traction bronchiectasis and airspace disease.  This suggests an ongoing process, such as recurrent aspiration, although an acute abnormality such as  infection or recent aspiration are not excluded.  3. Tiny bilateral pulmonary nodules.  The largest of these measures 6 mm.  A CT in 6 months is recommended.      Electronically signed by: Larisa Blanchard  Date:    12/11/2022  Time:    12:11               Narrative:    EXAMINATION:  CT CHEST ABDOMEN PELVIS WITHOUT CONTRAST(XPD)    CLINICAL HISTORY:  intractable vomiting, has Deep brain stimulator, cxr ? pna;    TECHNIQUE:  Low dose axial images, sagittal and coronal reformations were obtained from the thoracic inlet to the pubic symphysis    COMPARISON:  Chest radiograph performed earlier today    FINDINGS:  CHEST    Support tubes and lines: None.    Aorta: Moderately calcified but normal caliber    Heart: Normal size..    Coronary arteries: No calcifications.    Pericardium: Normal. No effusion, thickening, or calcification.    Central pulmonary arteries: Normal caliber.    Base of neck/thyroid: Normal.    Lymph nodes: No supraclavicular, axillary, internal mammary, mediastinal or hilar lymphadenopathy.    Esophagus: Normal.    Pleura: No effusion, thickening or calcification.    Body wall: Bilateral breast implants the left implant appears collapsed.    Airways: Traction bronchiectasis is present primarily in the lower lobes.  Diffuse bronchial wall thickening is present.    Lungs: Emphysema is present.  Bibasilar dependent lower lobe airspace disease is present.a 4 mm nodule is present in the right upper lobe (series 4, image 235). A 6 mm nodule is present in the left upper lobe (series 4, image 338). A 5 mm nodule is present in the left lower lobe (series 4, image 340).  Additional small nodules are present bilaterally.    Bones: Unremarkable.    ABDOMEN/PELVIS    Liver: Unremarkable.    Gallbladder/bile ducts: Cholelithiasis is present.  The gallbladder is distended over 5 cm.  Possible mild surrounding inflammatory stranding.  No intra or extrahepatic biliary ductal dilatation.    Pancreas:  Unremarkable.    Spleen: Unremarkable.    Adrenals: Unremarkable.    Kidneys: A tiny nonobstructing right renal stone is present.  An 8 mm low-attenuation lesion in the inferior pole of the right kidney is too small to definitively characterize.    Lymph nodes: No abdominal or pelvic lymphadenopathy.    Bowel and mesentery: Unremarkable.    Abdominal aorta: Heavily calcified but normal caliber    Inferior vena cava: Unremarkable.    Free fluid or free air: None.    Pelvis: The bladder is mildly distended.    Body wall: Unremarkable.    Bones: Unremarkable.                                       CT Head Without Contrast (Final result)  Result time 12/11/22 12:19:48      Final result by Gavin Light MD (12/11/22 12:19:48)                   Impression:      1. Allowing for motion artifact, no convincing acute intracranial abnormalities noting sequela of chronic microvascular ischemic change and senescent change.  2. Stable positioning of deep brain stimulator leads.      Electronically signed by: Gavin Light MD  Date:    12/11/2022  Time:    12:19               Narrative:    EXAMINATION:  CT HEAD WITHOUT CONTRAST    CLINICAL HISTORY:  intractable vomiting;    TECHNIQUE:  Low dose axial images were obtained through the head.  Coronal and sagittal reformations were also performed. Contrast was not administered.    COMPARISON:  01/04/2022    FINDINGS:  There is motion artifact.    There is generalized cerebral volume loss.  There is hypoattenuation in a periventricular fashion, likely sequela of chronic microvascular ischemic change.Bilateral deep brain stimulator leads are in stable position.  There is no evidence of acute major vascular territory infarct, hemorrhage, or mass.  There is no hydrocephalus.  There are no abnormal extra-axial fluid collections.  The paranasal sinuses and mastoid air cells are clear, and there is no evidence of calvarial fracture.  The visualized soft tissues are unremarkable.                                        X-Ray Chest AP Portable (Final result)  Result time 12/11/22 10:59:32      Final result by Larisa Blanchard MD (12/11/22 10:59:32)                   Impression:      Bibasilar airspace disease, that could be secondary to atelectasis, aspiration and/or pneumonia.    Possible small left pleural effusion.      Electronically signed by: Larisa Blanchard  Date:    12/11/2022  Time:    10:59               Narrative:    EXAMINATION:  XR CHEST AP PORTABLE    CLINICAL HISTORY:  Essential (primary) hypertension    TECHNIQUE:  Single frontal view of the chest was performed.    COMPARISON:  12/06/2021    FINDINGS:  Bibasilar airspace disease, right greater than left, is present.  Possible small left pleural effusio.  Heart size is unchanged.  A device overlies the left chest that is new when compared to prior imaging.                                       Medications   ciprofloxacin (CIPRO)400mg/200ml D5W IVPB 400 mg (has no administration in time range)   metronidazole IVPB 500 mg (has no administration in time range)   sodium chloride 0.9% bolus 2,000 mL (0 mLs Intravenous Stopped 12/11/22 0931)   hydrALAZINE injection 10 mg (10 mg Intravenous Given 12/11/22 0831)   ondansetron injection 8 mg (8 mg Intravenous Given 12/11/22 0837)   HYDROmorphone injection 1 mg (1 mg Intravenous Given 12/11/22 0843)   metoclopramide HCl injection 10 mg (10 mg Intravenous Given 12/11/22 1029)   ondansetron injection 8 mg (8 mg Intravenous Given 12/11/22 1150)   HYDROmorphone injection 1 mg (1 mg Intravenous Given 12/11/22 1419)   promethazine (PHENERGAN) 25 mg in dextrose 5 % 50 mL IVPB (0 mg Intravenous Stopped 12/11/22 1441)      Unclear etiology of symptoms, pt does have cp/sob trop 1 neg, ddimer elevated  Cxr concerning for possible pna, no pna on CT. Given ddimer elevation have ordered vq scan  Pt has contrast allergy: intractable vomiting/diarrhea/rash.  Lipase wnl    Ct head done for vomiting in  setting of brain stimulator. Non acute    Ct does show concern for possible acute cholecystitis     including cholelithiasis, distended gallbladder and possible mild surrounding inflammatory stranding. However pt afebrile wbc 9.28       Attending Attestation:         Attending Critical Care:   Critical Care Times:   Direct Patient Care (initial evaluation, reassessments, and time considering the case)................................................................60 minutes.   Additional History from reviewing old medical records or taking additional history from the family, EMS, PCP, etc.......................10 minutes.   Ordering, Reviewing, and Interpreting Diagnostic Studies...............................................................................................................10 minutes.   Documentation..................................................................................................................................................................................10 minutes.   Consultation with other Physicians. .................................................................................................................................................15 minutes.   ==============================================================  Total Critical Care Time - exclusive of procedural time: 105 minutes.  ==============================================================                 Surgery asks that pt be observed on medicine service and they will consult.      Currently pending: VQ scan, RUQ US, trop 2.  Clinical Impression:   Final diagnoses:  [I10] HTN (hypertension)  [K81.9] Cholecystitis (Primary)  [R91.8] Pulmonary nodules        ED Disposition Condition    Observation Stable                Angelia Bacon MD  12/11/22 1543       Angelia Bacon MD  12/11/22 1544       Angelia Bacon MD  12/11/22 2193

## 2022-12-12 PROBLEM — K80.00 CALCULUS OF GALLBLADDER WITH ACUTE CHOLECYSTITIS WITHOUT OBSTRUCTION: Status: ACTIVE | Noted: 2022-12-12

## 2022-12-12 LAB
ALBUMIN SERPL BCP-MCNC: 3.1 G/DL (ref 3.5–5.2)
ALP SERPL-CCNC: 71 U/L (ref 55–135)
ALT SERPL W/O P-5'-P-CCNC: 16 U/L (ref 10–44)
ANION GAP SERPL CALC-SCNC: 9 MMOL/L (ref 8–16)
ASCENDING AORTA: 2.38 CM
AST SERPL-CCNC: 24 U/L (ref 10–40)
AV INDEX (PROSTH): 0.83
AV MEAN GRADIENT: 5 MMHG
AV PEAK GRADIENT: 8 MMHG
AV VALVE AREA: 2.37 CM2
AV VELOCITY RATIO: 0.82
BASOPHILS # BLD AUTO: 0.05 K/UL (ref 0–0.2)
BASOPHILS NFR BLD: 0.5 % (ref 0–1.9)
BILIRUB SERPL-MCNC: 0.6 MG/DL (ref 0.1–1)
BSA FOR ECHO PROCEDURE: 1.78 M2
BUN SERPL-MCNC: 9 MG/DL (ref 8–23)
CALCIUM SERPL-MCNC: 8.5 MG/DL (ref 8.7–10.5)
CHLORIDE SERPL-SCNC: 104 MMOL/L (ref 95–110)
CO2 SERPL-SCNC: 26 MMOL/L (ref 23–29)
CREAT SERPL-MCNC: 0.8 MG/DL (ref 0.5–1.4)
CV ECHO LV RWT: 0.62 CM
DIFFERENTIAL METHOD: ABNORMAL
DOP CALC AO PEAK VEL: 1.45 M/S
DOP CALC AO VTI: 25.9 CM
DOP CALC LVOT AREA: 2.9 CM2
DOP CALC LVOT DIAMETER: 1.91 CM
DOP CALC LVOT PEAK VEL: 1.19 M/S
DOP CALC LVOT STROKE VOLUME: 61.28 CM3
DOP CALCLVOT PEAK VEL VTI: 21.4 CM
E WAVE DECELERATION TIME: 253.69 MSEC
E/A RATIO: 0.74
E/E' RATIO: 10.18 M/S
ECHO LV POSTERIOR WALL: 1.14 CM (ref 0.6–1.1)
EJECTION FRACTION: 65 %
EOSINOPHIL # BLD AUTO: 0.1 K/UL (ref 0–0.5)
EOSINOPHIL NFR BLD: 0.6 % (ref 0–8)
ERYTHROCYTE [DISTWIDTH] IN BLOOD BY AUTOMATED COUNT: 13.5 % (ref 11.5–14.5)
EST. GFR  (NO RACE VARIABLE): >60 ML/MIN/1.73 M^2
FRACTIONAL SHORTENING: 44 % (ref 28–44)
GLUCOSE SERPL-MCNC: 99 MG/DL (ref 70–110)
HCT VFR BLD AUTO: 42.1 % (ref 37–48.5)
HGB BLD-MCNC: 14 G/DL (ref 12–16)
IMM GRANULOCYTES # BLD AUTO: 0.03 K/UL (ref 0–0.04)
IMM GRANULOCYTES NFR BLD AUTO: 0.3 % (ref 0–0.5)
INR PPP: 1 (ref 0.8–1.2)
INTERVENTRICULAR SEPTUM: 1.22 CM (ref 0.6–1.1)
IVC DIAMETER: 1.26 CM
IVRT: 144.62 MSEC
LA MAJOR: 5.51 CM
LA WIDTH: 2.8 CM
LEFT ATRIUM SIZE: 2.1 CM
LEFT INTERNAL DIMENSION IN SYSTOLE: 2.06 CM (ref 2.1–4)
LEFT VENTRICLE DIASTOLIC VOLUME INDEX: 31.56 ML/M2
LEFT VENTRICLE DIASTOLIC VOLUME: 56.8 ML
LEFT VENTRICLE MASS INDEX: 79 G/M2
LEFT VENTRICLE SYSTOLIC VOLUME INDEX: 7.6 ML/M2
LEFT VENTRICLE SYSTOLIC VOLUME: 13.68 ML
LEFT VENTRICULAR INTERNAL DIMENSION IN DIASTOLE: 3.66 CM (ref 3.5–6)
LEFT VENTRICULAR MASS: 141.32 G
LV LATERAL E/E' RATIO: 9.33 M/S
LV SEPTAL E/E' RATIO: 11.2 M/S
LVOT MG: 2.94 MMHG
LVOT MV: 0.8 CM/S
LYMPHOCYTES # BLD AUTO: 2.1 K/UL (ref 1–4.8)
LYMPHOCYTES NFR BLD: 18.9 % (ref 18–48)
MCH RBC QN AUTO: 26.8 PG (ref 27–31)
MCHC RBC AUTO-ENTMCNC: 33.3 G/DL (ref 32–36)
MCV RBC AUTO: 81 FL (ref 82–98)
MONOCYTES # BLD AUTO: 1.3 K/UL (ref 0.3–1)
MONOCYTES NFR BLD: 11.3 % (ref 4–15)
MV PEAK A VEL: 0.76 M/S
MV PEAK E VEL: 0.56 M/S
MV STENOSIS PRESSURE HALF TIME: 73.57 MS
MV VALVE AREA P 1/2 METHOD: 2.99 CM2
NEUTROPHILS # BLD AUTO: 7.6 K/UL (ref 1.8–7.7)
NEUTROPHILS NFR BLD: 68.4 % (ref 38–73)
NRBC BLD-RTO: 0 /100 WBC
PLATELET # BLD AUTO: 280 K/UL (ref 150–450)
PMV BLD AUTO: 10.7 FL (ref 9.2–12.9)
POTASSIUM SERPL-SCNC: 3.4 MMOL/L (ref 3.5–5.1)
PROT SERPL-MCNC: 6.3 G/DL (ref 6–8.4)
PROTHROMBIN TIME: 10.9 SEC (ref 9–12.5)
PULM VEIN S/D RATIO: 1.53
PV PEAK D VEL: 0.36 M/S
PV PEAK S VEL: 0.55 M/S
PV PEAK VELOCITY: 0.82 CM/S
RA MAJOR: 4.36 CM
RA PRESSURE: 3 MMHG
RA WIDTH: 2.5 CM
RBC # BLD AUTO: 5.22 M/UL (ref 4–5.4)
RIGHT VENTRICULAR END-DIASTOLIC DIMENSION: 3.43 CM
RV TISSUE DOPPLER FREE WALL SYSTOLIC VELOCITY 1 (APICAL 4 CHAMBER VIEW): 0.01 CM/S
SINUS: 3.2 CM
SODIUM SERPL-SCNC: 139 MMOL/L (ref 136–145)
STJ: 2.41 CM
TDI LATERAL: 0.06 M/S
TDI SEPTAL: 0.05 M/S
TDI: 0.06 M/S
TRICUSPID ANNULAR PLANE SYSTOLIC EXCURSION: 2.26 CM
TROPONIN I SERPL DL<=0.01 NG/ML-MCNC: 0.05 NG/ML (ref 0–0.03)
TROPONIN I SERPL DL<=0.01 NG/ML-MCNC: 0.1 NG/ML (ref 0–0.03)
WBC # BLD AUTO: 11.03 K/UL (ref 3.9–12.7)

## 2022-12-12 PROCEDURE — 96361 HYDRATE IV INFUSION ADD-ON: CPT

## 2022-12-12 PROCEDURE — 36415 COLL VENOUS BLD VENIPUNCTURE: CPT | Performed by: PHYSICIAN ASSISTANT

## 2022-12-12 PROCEDURE — 80053 COMPREHEN METABOLIC PANEL: CPT | Performed by: PHYSICIAN ASSISTANT

## 2022-12-12 PROCEDURE — 25000003 PHARM REV CODE 250: Performed by: STUDENT IN AN ORGANIZED HEALTH CARE EDUCATION/TRAINING PROGRAM

## 2022-12-12 PROCEDURE — 96376 TX/PRO/DX INJ SAME DRUG ADON: CPT

## 2022-12-12 PROCEDURE — 85025 COMPLETE CBC W/AUTO DIFF WBC: CPT | Performed by: PHYSICIAN ASSISTANT

## 2022-12-12 PROCEDURE — 94760 N-INVAS EAR/PLS OXIMETRY 1: CPT

## 2022-12-12 PROCEDURE — G0378 HOSPITAL OBSERVATION PER HR: HCPCS

## 2022-12-12 PROCEDURE — 25000003 PHARM REV CODE 250: Performed by: PHYSICIAN ASSISTANT

## 2022-12-12 PROCEDURE — 27000221 HC OXYGEN, UP TO 24 HOURS

## 2022-12-12 PROCEDURE — 85610 PROTHROMBIN TIME: CPT | Performed by: PHYSICIAN ASSISTANT

## 2022-12-12 PROCEDURE — 99203 OFFICE O/P NEW LOW 30 MIN: CPT | Mod: GC,,, | Performed by: SURGERY

## 2022-12-12 PROCEDURE — 84484 ASSAY OF TROPONIN QUANT: CPT | Performed by: PHYSICIAN ASSISTANT

## 2022-12-12 PROCEDURE — 96366 THER/PROPH/DIAG IV INF ADDON: CPT

## 2022-12-12 PROCEDURE — 84484 ASSAY OF TROPONIN QUANT: CPT | Mod: 91 | Performed by: PHYSICIAN ASSISTANT

## 2022-12-12 PROCEDURE — 63600175 PHARM REV CODE 636 W HCPCS: Performed by: PHYSICIAN ASSISTANT

## 2022-12-12 PROCEDURE — 99203 PR OFFICE/OUTPT VISIT, NEW, LEVL III, 30-44 MIN: ICD-10-PCS | Mod: GC,,, | Performed by: SURGERY

## 2022-12-12 RX ORDER — MAG HYDROX/ALUMINUM HYD/SIMETH 200-200-20
30 SUSPENSION, ORAL (FINAL DOSE FORM) ORAL ONCE
Status: COMPLETED | OUTPATIENT
Start: 2022-12-12 | End: 2022-12-12

## 2022-12-12 RX ORDER — FAMOTIDINE 20 MG/1
20 TABLET, FILM COATED ORAL 2 TIMES DAILY
Status: DISCONTINUED | OUTPATIENT
Start: 2022-12-12 | End: 2022-12-14 | Stop reason: HOSPADM

## 2022-12-12 RX ORDER — OXYBUTYNIN CHLORIDE 5 MG/1
5 TABLET ORAL 2 TIMES DAILY
Status: DISCONTINUED | OUTPATIENT
Start: 2022-12-12 | End: 2022-12-14 | Stop reason: HOSPADM

## 2022-12-12 RX ORDER — LIDOCAINE HYDROCHLORIDE 20 MG/ML
15 SOLUTION OROPHARYNGEAL ONCE
Status: COMPLETED | OUTPATIENT
Start: 2022-12-12 | End: 2022-12-12

## 2022-12-12 RX ORDER — GABAPENTIN 300 MG/1
300 CAPSULE ORAL 3 TIMES DAILY
Status: DISCONTINUED | OUTPATIENT
Start: 2022-12-12 | End: 2022-12-14 | Stop reason: HOSPADM

## 2022-12-12 RX ORDER — SODIUM CHLORIDE, SODIUM LACTATE, POTASSIUM CHLORIDE, CALCIUM CHLORIDE 600; 310; 30; 20 MG/100ML; MG/100ML; MG/100ML; MG/100ML
INJECTION, SOLUTION INTRAVENOUS CONTINUOUS
Status: DISCONTINUED | OUTPATIENT
Start: 2022-12-12 | End: 2022-12-13

## 2022-12-12 RX ADMIN — OXYBUTYNIN CHLORIDE 5 MG: 5 TABLET ORAL at 10:12

## 2022-12-12 RX ADMIN — PRIMIDONE 100 MG: 50 TABLET ORAL at 08:12

## 2022-12-12 RX ADMIN — PIPERACILLIN AND TAZOBACTAM 4.5 G: 4; .5 INJECTION, POWDER, LYOPHILIZED, FOR SOLUTION INTRAVENOUS; PARENTERAL at 01:12

## 2022-12-12 RX ADMIN — SERTRALINE HYDROCHLORIDE 100 MG: 50 TABLET ORAL at 08:12

## 2022-12-12 RX ADMIN — SODIUM CHLORIDE, SODIUM LACTATE, POTASSIUM CHLORIDE, AND CALCIUM CHLORIDE: .6; .31; .03; .02 INJECTION, SOLUTION INTRAVENOUS at 02:12

## 2022-12-12 RX ADMIN — PRIMIDONE 100 MG: 50 TABLET ORAL at 02:12

## 2022-12-12 RX ADMIN — ALUMINUM HYDROXIDE, MAGNESIUM HYDROXIDE, AND DIMETHICONE 30 ML: 200; 20; 200 SUSPENSION ORAL at 10:12

## 2022-12-12 RX ADMIN — AMLODIPINE BESYLATE 5 MG: 5 TABLET ORAL at 08:12

## 2022-12-12 RX ADMIN — GABAPENTIN 300 MG: 300 CAPSULE ORAL at 08:12

## 2022-12-12 RX ADMIN — LIDOCAINE HYDROCHLORIDE 15 ML: 20 SOLUTION ORAL; TOPICAL at 10:12

## 2022-12-12 RX ADMIN — FAMOTIDINE 20 MG: 20 TABLET ORAL at 08:12

## 2022-12-12 RX ADMIN — PIPERACILLIN AND TAZOBACTAM 4.5 G: 4; .5 INJECTION, POWDER, LYOPHILIZED, FOR SOLUTION INTRAVENOUS; PARENTERAL at 10:12

## 2022-12-12 RX ADMIN — GABAPENTIN 300 MG: 300 CAPSULE ORAL at 02:12

## 2022-12-12 NOTE — ASSESSMENT & PLAN NOTE
73 yo F with multiple medical co-morbidities who presents with acute onset SURYA/Chest pain with imaging showing cholelithiasis without evidence of acute cholecystitis and a distended gallbladder. No evidence of choledocholithiasis or acute cholecystitis on lab work. Exam and complaints atypical for acute eliot presentation.     - Recommend HIDA scan to rule out acute cholecystitis  - Recommend GI cocktail for possible reflux as cause of symptoms  - No indication for antibiotics from gallbladder standpoint, antibiotics per primary for pulmonary findings  - Final recommendations pending results of HIDA scan  - Rest of care per primary

## 2022-12-12 NOTE — PLAN OF CARE
Problem: Adult Inpatient Plan of Care  Goal: Plan of Care Review  Flowsheets (Taken 12/12/2022 0335)  Plan of Care Reviewed With: patient  Goal: Absence of Hospital-Acquired Illness or Injury  Intervention: Identify and Manage Fall Risk  Flowsheets (Taken 12/12/2022 0335)  Safety Promotion/Fall Prevention:   Fall Risk reviewed with patient/family   medications reviewed   lighting adjusted   room near unit station   side rails raised x 2  Intervention: Prevent Skin Injury  Flowsheets (Taken 12/12/2022 0335)  Body Position: position maintained  Intervention: Prevent and Manage VTE (Venous Thromboembolism) Risk  Flowsheets (Taken 12/12/2022 0335)  VTE Prevention/Management: ROM (active) performed  Range of Motion: active ROM (range of motion) encouraged  Goal: Optimal Comfort and Wellbeing  Intervention: Monitor Pain and Promote Comfort  Flowsheets (Taken 12/12/2022 0335)  Pain Management Interventions: pain management plan reviewed with patient/caregiver     Problem: Pain Acute  Goal: Acceptable Pain Control and Functional Ability  Intervention: Develop Pain Management Plan  Flowsheets (Taken 12/12/2022 0335)  Pain Management Interventions: pain management plan reviewed with patient/caregiver  Intervention: Prevent or Manage Pain  Flowsheets (Taken 12/12/2022 0335)  Medication Review/Management: medications reviewed  Intervention: Optimize Psychosocial Wellbeing  Flowsheets (Taken 12/12/2022 0350)  Supportive Measures:   active listening utilized   relaxation techniques promoted

## 2022-12-12 NOTE — CONSULTS
[facility]  [facility]  Consult Note    Patient Name: Elsa Steel  MRN: [id number]  Code Status: Full Code  Admission Date: 12/11/2022  Hospital Length of Stay: 0 days  Attending Physician: Oscar Larson III, MD  Primary Care Provider: David Olson MD    Patient information was obtained from patient, past medical records and ER records.     Inpatient consult to General surgery  Consult performed by: Karen Guthrie MD  Consult ordered by: Angelia Bacon MD  Reason for consult: ?cholecystitis        Subjective:     Principal Problem: Abdominal pain    History of Present Illness: 71 yo F with history of HTN, essential tremors, depression who presents to ED with one day history of epigastric pain. Pain was sudden in onset and described as sharp with radiation into her chest. She has has associated nausea and vomiting. Denies fevers or chills. Having normal bowel movements without constipation or diarrhea. Denies any episodes of similar pain in the past. Work up in ED ruled out ACS and PE as possible etiologies of her pain. CT imaging showed findings consistent with possible aspiration/early pneumonia as well as a distended gallbladder with no associated ductal dilation. Ultrasound showed cholelithiasis without evidence of acute cholecystitis (normal duct, no wall thickening, no pericholecystic fluid), negative Almeida's sign. Lab work relatively unremarkable, no leukocytosis (no left shift), normal LFTs, normal lipase. General Surgery was consulted for possible cholecystectomy.       No current facility-administered medications on file prior to encounter.     Current Outpatient Medications on File Prior to Encounter   Medication Sig    amLODIPine (NORVASC) 5 MG tablet Take 5 mg by mouth once daily.    gabapentin (NEURONTIN) 300 MG capsule Take 300 mg by mouth 3 (three) times daily.    oxybutynin (DITROPAN) 5 MG Tab Take 5 mg by mouth 2 (two) times daily.    primidone (MYSOLINE) 50 MG  Tab Take 2 tablets (100 mg total) by mouth 3 (three) times daily.    sertraline (ZOLOFT) 100 MG tablet Take 100 mg by mouth once daily.    triamcinolone acetonide 0.1% (KENALOG) 0.1 % ointment Apply topically 2 (two) times daily.       Review of patient's allergies indicates:   Allergen Reactions    Iodine and iodide containing products        Past Medical History:   Diagnosis Date    Dyslipidemia     HTN (hypertension)     Tremor      Past Surgical History:   Procedure Laterality Date    DEEP BRAIN STIMULATOR PLACEMENT Bilateral 1/4/2022    Procedure: INSERTION, DEEP BRAIN STIMULATOR BILATERAL VIM;  Surgeon: Neal Hernández MD;  Location: [facility] OR [address] [place] [address];  Service: Neurosurgery;  Laterality: Bilateral;  BILATERAL (LEFT FIRST, RIGHT SECOND)  INSERTION ELECTRODES FOR DEEP BRAIN STIMULATION IN VENTRALIS INTERMEDIUS/TEDS & SCD/ MEDTRONICS, ALYSSIA WHITE. CO SURGERY WITH DR JT MITCHELL.    ESOPHAGOGASTRODUODENOSCOPY N/A 5/21/2021    Procedure: EGD (ESOPHAGOGASTRODUODENOSCOPY);  Surgeon: Juan R Watson MD;  Location: [facility];  Service: Endoscopy;  Laterality: N/A;  covid test 5/18 [place], instr portal -ml    HAND SURGERY      HYSTERECTOMY      INSERTION OF DEEP BRAIN STIMULATOR GENERATOR Left 1/11/2022    Procedure: INSERTION, PULSE GENERATOR, DEEP BRAIN STIMULATOR;  Surgeon: Neal Hernández MD;  Location: [facility] OR [address] [place] [address];  Service: Neurosurgery;  Laterality: Left;  INSERT PULSE GENERATOR FOR DEEP BRAIN STIMULATION/ TEDS & SCD/ MEDTRONICS, ALYSSIA WHITE.    PLACEMENT OF FIDUCIAL SCREW INTO SPINE N/A 1/4/2022    Procedure: INSERTION, FIDUCIAL SCREW, SKULL;  Surgeon: Neal Hernández MD;  Location: [address];  Service: Neurosurgery;  Laterality: N/A;  INSERTION FIDUCIAL SCREWS FOR DEEP BRAIN STIMULATION/ TEDS & ACD/ MEDTRONICS, ALYSSIA FRANTZ/ CO SURGERY WITH DR JT MITCHELL     Family History       Problem Relation (Age of Onset)    Alzheimer's disease Mother    Cancer Father          Tobacco Use    Smoking  status: Former     Packs/day: 2.00     Years: 30.00     Pack years: 60.00     Types: Cigarettes     Start date: 1968     Quit date: 1999     Years since quittin.9    Smokeless tobacco: Never   Substance and Sexual Activity    Alcohol use: Not Currently    Drug use: Never    Sexual activity: Not on file     Comment: divorce     Review of Systems   Constitutional:  Negative for chills and fever.   HENT:  Negative for trouble swallowing and voice change.    Respiratory:  Positive for chest tightness and shortness of breath. Negative for cough.    Cardiovascular:  Positive for chest pain. Negative for palpitations.   Gastrointestinal:  Positive for nausea. Negative for abdominal pain, constipation, diarrhea and vomiting.   Genitourinary:  Negative for difficulty urinating and dysuria.   Musculoskeletal:  Negative for back pain and gait problem.   Neurological:  Negative for dizziness and weakness.   Objective:     Vital Signs (Most Recent):  Temp: 98.3 °F (36.8 °C) (22 041)  Pulse: 73 (22 041)  Resp: 18 (22 041)  BP: 132/69 (22 041)  SpO2: 95 % (22 0756) Vital Signs (24h Range):  Temp:  [97.6 °F (36.4 °C)-98.5 °F (36.9 °C)] 98.3 °F (36.8 °C)  Pulse:  [] 73  Resp:  [12-] 18  SpO2:  [91 %-95 %] 95 %  BP: (118-202)/(65-98) 132/69     Weight: 70.5 kg (155 lb 6.8 oz)  Body mass index is 25.09 kg/m².    Physical Exam  Vitals reviewed.   Constitutional:       Appearance: Normal appearance.   HENT:      Head: Normocephalic and atraumatic.   Eyes:      Extraocular Movements: Extraocular movements intact.      Conjunctiva/sclera: Conjunctivae normal.   Cardiovascular:      Rate and Rhythm: Normal rate.      Pulses: Normal pulses.   Pulmonary:      Effort: Pulmonary effort is normal. No respiratory distress.   Abdominal:      General: Abdomen is flat. There is no distension.      Palpations: Abdomen is soft.      Tenderness: There is abdominal tenderness (mildly tender to  palpation in SURYA region, negative Almeida's). There is no guarding or rebound.   Musculoskeletal:         General: No deformity.      Cervical back: Normal range of motion and neck supple.   Skin:     General: Skin is warm and dry.   Neurological:      General: No focal deficit present.      Mental Status: She is alert and oriented to person, place, and time.       Significant Labs:  I have reviewed all pertinent lab results within the past 24 hours.  CBC:   Recent Labs   Lab 12/12/22  0706   WBC 11.03   RBC 5.22   HGB 14.0   HCT 42.1      MCV 81*   MCH 26.8*   MCHC 33.3     BMP:   Recent Labs   Lab 12/12/22  0706   GLU 99      K 3.4*      CO2 26   BUN 9   CREATININE 0.8   CALCIUM 8.5*       Significant Diagnostics:  I have reviewed all pertinent imaging results/findings within the past 24 hours.      Assessment/Plan:     * Abdominal pain  73 yo F with multiple medical co-morbidities who presents with acute onset SURYA/Chest pain with imaging showing cholelithiasis without evidence of acute cholecystitis and a distended gallbladder. No evidence of choledocholithiasis or acute cholecystitis on lab work. Exam and complaints atypical for acute eliot presentation.     - Recommend HIDA scan to rule out acute cholecystitis  - Recommend GI cocktail for possible reflux as cause of symptoms  - No indication for antibiotics from gallbladder standpoint, antibiotics per primary for pulmonary findings  - Final recommendations pending results of HIDA scan  - Rest of care per primary      VTE Risk Mitigation (From admission, onward)         Ordered     IP VTE HIGH RISK PATIENT  Once         12/11/22 1549     Place sequential compression device  Until discontinued         12/11/22 1549     Place DEANNA hose  Until discontinued         12/11/22 1549                Thank you for your consult. I will follow-up with patient. Please contact us if you have any additional questions.    Karen Guthrie MD  General  Surgery  [facility] [place] - Telemetry

## 2022-12-12 NOTE — PROGRESS NOTES
[facility] Medicine  Progress Note    Patient Name: Elsa Steel  MRN: [id number]  Patient Class: OP- Observation   Admission Date: 12/11/2022  Length of Stay: 0 days  Attending Physician: Oscar Larson III, MD  Primary Care Provider: David Olson MD        Subjective:     Principal Problem:Abdominal pain        HPI:  Elsa Steel 72 y.o. female with essential tremor, HTN, presents to the hospital with a chief complaint of abdominal pain.  She reports sudden onset epigastric abdominal pain with radiation to her chest began this moaning with associated nausea and vomiting.  She has not attempted any treatment home.  She denies any aggravating alleviating factors.  She describes as constant stabbing pain.  She reports this never occurred before.  She denies fever shortness of breath leg swelling syncope dizziness dysuria melena hematuria hematemesis.  Her last bowel movement was this morning.  She does not take any blood thinners.    In the ED, afebrile without leukocytosis lactic acid negative BNP negative troponin negative creatinine 0.7 the scan Q scan low probability of pulmonary embolism CT head without acute intracranial abnormalities stable positioning of deep brain stimulator CT chest abdomen pelvis without contrast suggestive of acute cholecystitis including cholelithiasis bilateral dependent lower traction bronchiectasis in airspace disease.      Overview/Hospital Course:  No notes on file    No new subjective & objective note has been filed under this hospital service since the last note was generated.      Assessment/Plan:      * Abdominal pain  Presents with abdominal pain nausea and vomiting. CT abdomen with findings suggestive of acute cholecystitis and cholelithiasis, also with bilateral lower love bronchiectasis and airspace disease.   Discussed with general surgery by ED recommending US abdomen  US abd with cholelithiasis and sludge, but no definitve findings of  cholecystitis  -abd pain improved    -NPO, will advance diet if ok with surgery and npo at midnight for HIDA in am  -surgery consulted    Calculus of gallbladder with acute cholecystitis without obstruction  Questionable finding of possible cholecystitis on CT abd/pelvis.  Abd US less revealing and recommendation for HIDA scan to further evaluate  -Cannot get HIDA scan today due to recent V/Q scan for possible PE on admission    -surgery consulted  -awaiting HIDA scan tomorrow    HTN (hypertension)  Continue home amlodipine    Pulmonary nodules  Seen on CT abdomen. Will recommend continued outpatient follow up with pulmonology.     Depression  Continue home sertraline.     Essential tremor  Continue home primidone. Pt has DBS in place. Called radiology to confirm this would not be an issue with pt having HIDA scan. Appears to not be an issue      VTE Risk Mitigation (From admission, onward)         Ordered     IP VTE HIGH RISK PATIENT  Once         12/11/22 1549     Place sequential compression device  Until discontinued         12/11/22 1549     Place DEANNA hose  Until discontinued         12/11/22 1549                Discharge Planning   TRI:      Code Status: Full Code   Is the patient medically ready for discharge?:     Reason for patient still in hospital (select all that apply): Treatment and Consult recommendations  Discharge Plan A: Home                  Oscar Larson III, MD  Department of Hospital Medicine   [facility]

## 2022-12-12 NOTE — NURSING TRANSFER
Nursing Transfer Note      12/11/2022     Reason patient is being transferred: Stable for Transfer    Transfer From: ED    Transfer via wheelchair    Transfer with  to O2, cardiac monitoring    Transported by Transporter Luis Gonzalez sent: N/a    Any special needs or follow-up needed: n/a    Chart send with patient: Yes    Notified: daughter, son (patient notified)    Patient reassessed at: 12/11/22 @1726    Upon arrival to floor: cardiac monitor applied, patient oriented to room, call bell in reach, and bed in lowest position

## 2022-12-12 NOTE — ASSESSMENT & PLAN NOTE
Presents with abdominal pain nausea and vomiting. CT abdomen with findings suggestive of acute cholecystitis and cholelithiasis, also with bilateral lower love bronchiectasis and airspace disease.   Discussed with general surgery by ED recommending US abdomen  US abd with cholelithiasis and sludge, but no definitve findings of cholecystitis  -abd pain improved    -NPO, will advance diet if ok with surgery and npo at midnight for HIDA in am  -surgery consulted

## 2022-12-12 NOTE — ASSESSMENT & PLAN NOTE
Continue home primidone. Pt has DBS in place. Called radiology to confirm this would not be an issue with pt having HIDA scan. Appears to not be an issue

## 2022-12-12 NOTE — ASSESSMENT & PLAN NOTE
Questionable finding of possible cholecystitis on CT abd/pelvis.  Abd US less revealing and recommendation for HIDA scan to further evaluate  -Cannot get HIDA scan today due to recent V/Q scan for possible PE on admission    -surgery consulted  -awaiting HIDA scan tomorrow

## 2022-12-12 NOTE — NURSING
Per handoff received from Selene FISHMAN LPN. Patient care assumed. Patients overall condition assessed and patient appears to be in NAD with no complaints of pain. 22g LFA PIV is clean, dry, and intact with antibiotics infusing. Bed alarm maintained for patient safety, call light in reach, and patient instructed to inform the nurse if anything is needed. Patient stable and will continue to be monitored.

## 2022-12-12 NOTE — HPI
71 yo F with history of HTN, essential tremors, depression who presents to ED with one day history of epigastric pain. Pain was sudden in onset and described as sharp with radiation into her chest. She has has associated nausea and vomiting. Denies fevers or chills. Having normal bowel movements without constipation or diarrhea. Denies any episodes of similar pain in the past. Work up in ED ruled out ACS and PE as possible etiologies of her pain. CT imaging showed findings consistent with possible aspiration/early pneumonia as well as a distended gallbladder with no associated ductal dilation. Ultrasound showed cholelithiasis without evidence of acute cholecystitis (normal duct, no wall thickening, no pericholecystic fluid), negative Almeida's sign. Lab work relatively unremarkable, no leukocytosis (no left shift), normal LFTs, normal lipase. General Surgery was consulted for possible cholecystectomy.    Telephone Encounter by Yumi Ley at 12/26/17 01:43 PM     Author:  Yumi Ley Service:  (none) Author Type:       Filed:  12/26/17 01:45 PM Encounter Date:  12/26/2017 Status:  Signed     :  Yumi Ley ()              EMILY AGUILERA    Patient Age: 19 year old    ACCT STATUS:   MESSAGE:[RI1.1T]     Karolina, mom, has verbal,[RI1.1C] states pt has been very depressed for a couple of months.   Pt is scheduled 12/27/17 with Gene.[RI1.1M]   Message confirmed with caller.  Call transferred to Triage Que       Next and Last Visit with Provider and Department  Next visit with GIANFRANCO MILLS is on 12/27/2017 at 10:40 AM in INTERNAL MEDICINE SEQ  Next visit with INTERNAL MEDICINE is on 12/27/2017 at 10:40 AM in INTERNAL MEDICINE SEQ  Last visit with GIANFRANCO MILLS was on 02/22/2016 at  2:00 PM in [facility] [place]  Last visit with INTERNAL MEDICINE was on 02/22/2016 at  2:00 PM in [facility] [place]     WEIGHT AND HEIGHT: As of 03/04/2016 weight is 299 lbs.(135.626 kg). Height is 5' 10\"(1.778 m).   BMI is 42.90 kg/(m^2) calculated from:     Height 5' 10\" (1.778 m) as of 3/4/16     Weight 299 lb (135.626 kg) as of 3/4/16[RI1.1T]      No Known Allergies[RI1.2T]  Current outpatient prescriptions       Medication  Sig Dispense Refill   • hydrocodone-acetaminophen (NORCO) 5-325 MG per tablet Take 1 Tab by mouth every 6 (six) hours as needed for Pain. 1 tablet every 8 hours as needed for pain        PHARMACY to use:[RI1.1T] na[RI1.1M]          Pharmacy preference(s) on file:    [facility] [address]  [facility] [address]    CALL BACK INFO:[RI1.1T] DO NOT LEAVE A MESSAGE - contact patient directly[RI1.1M]  ROUTING:[RI1.1T] Patient's physician/staff[RI1.1M]        PCP: Gianfranoc Mills MD         INS: Payor: [employer] / Plan: *No Plan* / Product Type: *No Product type* / Note: This is the primary coverage,  but no account was found for this location or the patient's primary location.   ADDRESS:  [address] [place] [address] [address] [place] [address][RI1.1T]       Revision History        User Key Date/Time User Provider Type Action    > RI1.2 12/26/17 01:45 PM Yumi Ley  Sign     RI1.1 12/26/17 01:43 PM Yumi Ley      C - Copied, M - Manual, T - Template

## 2022-12-12 NOTE — SUBJECTIVE & OBJECTIVE
No current facility-administered medications on file prior to encounter.     Current Outpatient Medications on File Prior to Encounter   Medication Sig    amLODIPine (NORVASC) 5 MG tablet Take 5 mg by mouth once daily.    gabapentin (NEURONTIN) 300 MG capsule Take 300 mg by mouth 3 (three) times daily.    oxybutynin (DITROPAN) 5 MG Tab Take 5 mg by mouth 2 (two) times daily.    primidone (MYSOLINE) 50 MG Tab Take 2 tablets (100 mg total) by mouth 3 (three) times daily.    sertraline (ZOLOFT) 100 MG tablet Take 100 mg by mouth once daily.    triamcinolone acetonide 0.1% (KENALOG) 0.1 % ointment Apply topically 2 (two) times daily.       Review of patient's allergies indicates:   Allergen Reactions    Iodine and iodide containing products        Past Medical History:   Diagnosis Date    Dyslipidemia     HTN (hypertension)     Tremor      Past Surgical History:   Procedure Laterality Date    DEEP BRAIN STIMULATOR PLACEMENT Bilateral 1/4/2022    Procedure: INSERTION, DEEP BRAIN STIMULATOR BILATERAL VIM;  Surgeon: Neal Hernández MD;  Location: [facility] OR [address] [place] [address];  Service: Neurosurgery;  Laterality: Bilateral;  BILATERAL (LEFT FIRST, RIGHT SECOND)  INSERTION ELECTRODES FOR DEEP BRAIN STIMULATION IN VENTRALIS INTERMEDIUS/TEDS & SCD/ ALYSSIA COOLEY. CO SURGERY WITH DR JT MITCHELL.    ESOPHAGOGASTRODUODENOSCOPY N/A 5/21/2021    Procedure: EGD (ESOPHAGOGASTRODUODENOSCOPY);  Surgeon: Juan R Watson MD;  Location: [facility];  Service: Endoscopy;  Laterality: N/A;  covid test 5/18 [place], instr portal -ml    HAND SURGERY      HYSTERECTOMY      INSERTION OF DEEP BRAIN STIMULATOR GENERATOR Left 1/11/2022    Procedure: INSERTION, PULSE GENERATOR, DEEP BRAIN STIMULATOR;  Surgeon: Neal Hernández MD;  Location: [address];  Service: Neurosurgery;  Laterality: Left;  INSERT PULSE GENERATOR FOR DEEP BRAIN STIMULATION/ TEDS & SCD/ ALYSSIA COOLEY.    PLACEMENT OF FIDUCIAL SCREW INTO SPINE N/A 1/4/2022     Procedure: INSERTION, FIDUCIAL SCREW, SKULL;  Surgeon: Neal Hernández MD;  Location: [facility] OR [address] [place] [address];  Service: Neurosurgery;  Laterality: N/A;  INSERTION FIDUCIAL SCREWS FOR DEEP BRAIN STIMULATION/ TEDS & ACD/ MEDTRONICS, ALYSSIA LANDRY/ CO SURGERY WITH DR JT MITCHELL     Family History       Problem Relation (Age of Onset)    Alzheimer's disease Mother    Cancer Father          Tobacco Use    Smoking status: Former     Packs/day: 2.00     Years: 30.00     Pack years: 60.00     Types: Cigarettes     Start date: 1968     Quit date: 1999     Years since quittin.9    Smokeless tobacco: Never   Substance and Sexual Activity    Alcohol use: Not Currently    Drug use: Never    Sexual activity: Not on file     Comment: divorce     Review of Systems   Constitutional:  Negative for chills and fever.   HENT:  Negative for trouble swallowing and voice change.    Respiratory:  Positive for chest tightness and shortness of breath. Negative for cough.    Cardiovascular:  Positive for chest pain. Negative for palpitations.   Gastrointestinal:  Positive for nausea. Negative for abdominal pain, constipation, diarrhea and vomiting.   Genitourinary:  Negative for difficulty urinating and dysuria.   Musculoskeletal:  Negative for back pain and gait problem.   Neurological:  Negative for dizziness and weakness.   Objective:     Vital Signs (Most Recent):  Temp: 98.3 °F (36.8 °C) (22 041)  Pulse: 73 (22 0411)  Resp: 18 (22 041)  BP: 132/69 (22 041)  SpO2: 95 % (22 0756) Vital Signs (24h Range):  Temp:  [97.6 °F (36.4 °C)-98.5 °F (36.9 °C)] 98.3 °F (36.8 °C)  Pulse:  [] 73  Resp:  [12-] 18  SpO2:  [91 %-95 %] 95 %  BP: (118-202)/(65-98) 132/69     Weight: 70.5 kg (155 lb 6.8 oz)  Body mass index is 25.09 kg/m².    Physical Exam  Vitals reviewed.   Constitutional:       Appearance: Normal appearance.   HENT:      Head: Normocephalic and atraumatic.   Eyes:      Extraocular Movements:  Extraocular movements intact.      Conjunctiva/sclera: Conjunctivae normal.   Cardiovascular:      Rate and Rhythm: Normal rate.      Pulses: Normal pulses.   Pulmonary:      Effort: Pulmonary effort is normal. No respiratory distress.   Abdominal:      General: Abdomen is flat. There is no distension.      Palpations: Abdomen is soft.      Tenderness: There is abdominal tenderness (mildly tender to palpation in SURYA region, negative Almeida's). There is no guarding or rebound.   Musculoskeletal:         General: No deformity.      Cervical back: Normal range of motion and neck supple.   Skin:     General: Skin is warm and dry.   Neurological:      General: No focal deficit present.      Mental Status: She is alert and oriented to person, place, and time.       Significant Labs:  I have reviewed all pertinent lab results within the past 24 hours.  CBC:   Recent Labs   Lab 12/12/22  0706   WBC 11.03   RBC 5.22   HGB 14.0   HCT 42.1      MCV 81*   MCH 26.8*   MCHC 33.3     BMP:   Recent Labs   Lab 12/12/22  0706   GLU 99      K 3.4*      CO2 26   BUN 9   CREATININE 0.8   CALCIUM 8.5*       Significant Diagnostics:  I have reviewed all pertinent imaging results/findings within the past 24 hours.

## 2022-12-13 PROCEDURE — 94761 N-INVAS EAR/PLS OXIMETRY MLT: CPT

## 2022-12-13 PROCEDURE — 63600175 PHARM REV CODE 636 W HCPCS: Performed by: STUDENT IN AN ORGANIZED HEALTH CARE EDUCATION/TRAINING PROGRAM

## 2022-12-13 PROCEDURE — 96361 HYDRATE IV INFUSION ADD-ON: CPT

## 2022-12-13 PROCEDURE — 27000221 HC OXYGEN, UP TO 24 HOURS

## 2022-12-13 PROCEDURE — 25000003 PHARM REV CODE 250: Performed by: STUDENT IN AN ORGANIZED HEALTH CARE EDUCATION/TRAINING PROGRAM

## 2022-12-13 PROCEDURE — 25000003 PHARM REV CODE 250: Performed by: PHYSICIAN ASSISTANT

## 2022-12-13 PROCEDURE — 63600175 PHARM REV CODE 636 W HCPCS: Performed by: PHYSICIAN ASSISTANT

## 2022-12-13 PROCEDURE — G0378 HOSPITAL OBSERVATION PER HR: HCPCS

## 2022-12-13 RX ORDER — MORPHINE SULFATE 4 MG/ML
2.8 INJECTION, SOLUTION INTRAMUSCULAR; INTRAVENOUS ONCE
Status: COMPLETED | OUTPATIENT
Start: 2022-12-13 | End: 2022-12-13

## 2022-12-13 RX ADMIN — PRIMIDONE 100 MG: 50 TABLET ORAL at 02:12

## 2022-12-13 RX ADMIN — AMLODIPINE BESYLATE 5 MG: 5 TABLET ORAL at 09:12

## 2022-12-13 RX ADMIN — SODIUM CHLORIDE, SODIUM LACTATE, POTASSIUM CHLORIDE, AND CALCIUM CHLORIDE: .6; .31; .03; .02 INJECTION, SOLUTION INTRAVENOUS at 01:12

## 2022-12-13 RX ADMIN — GABAPENTIN 300 MG: 300 CAPSULE ORAL at 09:12

## 2022-12-13 RX ADMIN — MORPHINE SULFATE 2.8 MG: 4 INJECTION INTRAVENOUS at 12:12

## 2022-12-13 RX ADMIN — SERTRALINE HYDROCHLORIDE 100 MG: 50 TABLET ORAL at 09:12

## 2022-12-13 RX ADMIN — PRIMIDONE 100 MG: 50 TABLET ORAL at 09:12

## 2022-12-13 RX ADMIN — FAMOTIDINE 20 MG: 20 TABLET ORAL at 09:12

## 2022-12-13 RX ADMIN — OXYBUTYNIN CHLORIDE 5 MG: 5 TABLET ORAL at 09:12

## 2022-12-13 RX ADMIN — GABAPENTIN 300 MG: 300 CAPSULE ORAL at 02:12

## 2022-12-13 NOTE — CONSULTS
Thank you for your consult to [facility]. We have reviewed the patient chart. This patient does meet criteria for [facility] service at this time. Will assume care on 12/13/22 at 6AM

## 2022-12-13 NOTE — PLAN OF CARE
Problem: Adult Inpatient Plan of Care  Goal: Plan of Care Review  Flowsheets (Taken 12/13/2022 0323)  Plan of Care Reviewed With: patient  Goal: Absence of Hospital-Acquired Illness or Injury  Intervention: Identify and Manage Fall Risk  Flowsheets (Taken 12/13/2022 0323)  Safety Promotion/Fall Prevention:   nonskid shoes/socks when out of bed   side rails raised x 2  Intervention: Prevent Skin Injury  Flowsheets (Taken 12/13/2022 0323)  Body Position: position maintained  Intervention: Prevent and Manage VTE (Venous Thromboembolism) Risk  Flowsheets (Taken 12/13/2022 0323)  Range of Motion: active ROM (range of motion) encouraged     Problem: Pain Acute  Goal: Acceptable Pain Control and Functional Ability  Intervention: Develop Pain Management Plan  Flowsheets (Taken 12/13/2022 0323)  Pain Management Interventions:   care clustered   pain management plan reviewed with patient/caregiver   medication offered   quiet environment facilitated   position adjusted  Intervention: Prevent or Manage Pain  Flowsheets (Taken 12/13/2022 0323)  Sleep/Rest Enhancement: relaxation techniques promoted  Medication Review/Management: medications reviewed  Intervention: Optimize Psychosocial Wellbeing  Flowsheets (Taken 12/13/2022 0323)  Supportive Measures: relaxation techniques promoted     Problem: Infection  Goal: Absence of Infection Signs and Symptoms  Intervention: Prevent or Manage Infection  Flowsheets (Taken 12/13/2022 0323)  Infection Management: aseptic technique maintained

## 2022-12-13 NOTE — PLAN OF CARE
Problem: Pain Acute  Goal: Acceptable Pain Control and Functional Ability  Outcome: Ongoing, Progressing  Intervention: Develop Pain Management Plan  Flowsheets (Taken 12/13/2022 1750)  Pain Management Interventions:   care clustered   medication offered   pillow support provided   position adjusted  Intervention: Prevent or Manage Pain  Flowsheets (Taken 12/13/2022 1750)  Sensory Stimulation Regulation: care clustered  Bowel Elimination Promotion:   commode/bedpan at bedside   diet adjusted  Medication Review/Management: medications reviewed

## 2022-12-13 NOTE — NURSING
Per handoff received from Shonda GREENWOOD RN. Patient care assumed. Patients overall condition assessed and patient appears to be in NAD with no complaints of pain. 22g LFA PIV is clean, dry, and intact with antibiotics infusing. Bed alarm maintained for patient safety, call light in reach, and patient instructed to inform the nurse if anything is needed. Patient stable and will continue to be monitored.

## 2022-12-13 NOTE — NURSING
Pt off unit to nuclear medicine for HIDA scan. Pt in wheelchair with transport on 2L NC.    Will monitor upon patient's arrival back to unit.

## 2022-12-14 ENCOUNTER — TELEPHONE (OUTPATIENT)
Dept: NEUROLOGY | Facility: CLINIC | Age: 72
End: 2022-12-14
Payer: MEDICARE

## 2022-12-14 VITALS
BODY MASS INDEX: 24.98 KG/M2 | RESPIRATION RATE: 19 BRPM | HEIGHT: 66 IN | TEMPERATURE: 98 F | WEIGHT: 155.44 LBS | DIASTOLIC BLOOD PRESSURE: 73 MMHG | HEART RATE: 86 BPM | OXYGEN SATURATION: 92 % | SYSTOLIC BLOOD PRESSURE: 156 MMHG

## 2022-12-14 LAB
ALBUMIN SERPL BCP-MCNC: 3 G/DL (ref 3.5–5.2)
ALP SERPL-CCNC: 75 U/L (ref 55–135)
ALT SERPL W/O P-5'-P-CCNC: 15 U/L (ref 10–44)
ANION GAP SERPL CALC-SCNC: 9 MMOL/L (ref 8–16)
AST SERPL-CCNC: 17 U/L (ref 10–40)
BASOPHILS # BLD AUTO: 0.06 K/UL (ref 0–0.2)
BASOPHILS NFR BLD: 0.7 % (ref 0–1.9)
BILIRUB SERPL-MCNC: 0.2 MG/DL (ref 0.1–1)
BUN SERPL-MCNC: 13 MG/DL (ref 8–23)
CALCIUM SERPL-MCNC: 8.8 MG/DL (ref 8.7–10.5)
CHLORIDE SERPL-SCNC: 105 MMOL/L (ref 95–110)
CO2 SERPL-SCNC: 29 MMOL/L (ref 23–29)
CREAT SERPL-MCNC: 0.7 MG/DL (ref 0.5–1.4)
DIFFERENTIAL METHOD: NORMAL
EOSINOPHIL # BLD AUTO: 0.4 K/UL (ref 0–0.5)
EOSINOPHIL NFR BLD: 4.9 % (ref 0–8)
ERYTHROCYTE [DISTWIDTH] IN BLOOD BY AUTOMATED COUNT: 13.6 % (ref 11.5–14.5)
EST. GFR  (NO RACE VARIABLE): >60 ML/MIN/1.73 M^2
GLUCOSE SERPL-MCNC: 99 MG/DL (ref 70–110)
HCT VFR BLD AUTO: 39.4 % (ref 37–48.5)
HGB BLD-MCNC: 13 G/DL (ref 12–16)
IMM GRANULOCYTES # BLD AUTO: 0.02 K/UL (ref 0–0.04)
IMM GRANULOCYTES NFR BLD AUTO: 0.2 % (ref 0–0.5)
LYMPHOCYTES # BLD AUTO: 2 K/UL (ref 1–4.8)
LYMPHOCYTES NFR BLD: 22.8 % (ref 18–48)
MCH RBC QN AUTO: 27.4 PG (ref 27–31)
MCHC RBC AUTO-ENTMCNC: 33 G/DL (ref 32–36)
MCV RBC AUTO: 83 FL (ref 82–98)
MONOCYTES # BLD AUTO: 1 K/UL (ref 0.3–1)
MONOCYTES NFR BLD: 11.2 % (ref 4–15)
NEUTROPHILS # BLD AUTO: 5.2 K/UL (ref 1.8–7.7)
NEUTROPHILS NFR BLD: 60.2 % (ref 38–73)
NRBC BLD-RTO: 0 /100 WBC
PLATELET # BLD AUTO: 275 K/UL (ref 150–450)
PMV BLD AUTO: 10.4 FL (ref 9.2–12.9)
POTASSIUM SERPL-SCNC: 4.3 MMOL/L (ref 3.5–5.1)
PROT SERPL-MCNC: 6.2 G/DL (ref 6–8.4)
RBC # BLD AUTO: 4.75 M/UL (ref 4–5.4)
SODIUM SERPL-SCNC: 143 MMOL/L (ref 136–145)
WBC # BLD AUTO: 8.6 K/UL (ref 3.9–12.7)

## 2022-12-14 PROCEDURE — 85025 COMPLETE CBC W/AUTO DIFF WBC: CPT | Performed by: STUDENT IN AN ORGANIZED HEALTH CARE EDUCATION/TRAINING PROGRAM

## 2022-12-14 PROCEDURE — 99213 OFFICE O/P EST LOW 20 MIN: CPT | Mod: ,,, | Performed by: SURGERY

## 2022-12-14 PROCEDURE — 25000003 PHARM REV CODE 250: Performed by: STUDENT IN AN ORGANIZED HEALTH CARE EDUCATION/TRAINING PROGRAM

## 2022-12-14 PROCEDURE — 25000003 PHARM REV CODE 250: Performed by: PHYSICIAN ASSISTANT

## 2022-12-14 PROCEDURE — 99213 PR OFFICE/OUTPT VISIT, EST, LEVL III, 20-29 MIN: ICD-10-PCS | Mod: ,,, | Performed by: SURGERY

## 2022-12-14 PROCEDURE — 36415 COLL VENOUS BLD VENIPUNCTURE: CPT | Performed by: STUDENT IN AN ORGANIZED HEALTH CARE EDUCATION/TRAINING PROGRAM

## 2022-12-14 PROCEDURE — 80053 COMPREHEN METABOLIC PANEL: CPT | Performed by: STUDENT IN AN ORGANIZED HEALTH CARE EDUCATION/TRAINING PROGRAM

## 2022-12-14 PROCEDURE — G0378 HOSPITAL OBSERVATION PER HR: HCPCS

## 2022-12-14 RX ORDER — FAMOTIDINE 20 MG/1
20 TABLET, FILM COATED ORAL 2 TIMES DAILY
Qty: 60 TABLET | Refills: 11 | Status: SHIPPED | OUTPATIENT
Start: 2022-12-14 | End: 2023-01-31

## 2022-12-14 RX ADMIN — FAMOTIDINE 20 MG: 20 TABLET ORAL at 08:12

## 2022-12-14 RX ADMIN — AMLODIPINE BESYLATE 5 MG: 5 TABLET ORAL at 08:12

## 2022-12-14 RX ADMIN — GABAPENTIN 300 MG: 300 CAPSULE ORAL at 08:12

## 2022-12-14 RX ADMIN — PRIMIDONE 100 MG: 50 TABLET ORAL at 08:12

## 2022-12-14 RX ADMIN — SERTRALINE HYDROCHLORIDE 100 MG: 50 TABLET ORAL at 08:12

## 2022-12-14 RX ADMIN — GABAPENTIN 300 MG: 300 CAPSULE ORAL at 02:12

## 2022-12-14 RX ADMIN — OXYBUTYNIN CHLORIDE 5 MG: 5 TABLET ORAL at 08:12

## 2022-12-14 RX ADMIN — PRIMIDONE 100 MG: 50 TABLET ORAL at 02:12

## 2022-12-14 NOTE — SUBJECTIVE & OBJECTIVE
Interval History: HIDA performed yesterday with no evidence of obstruction. Afebrile. WBC and markers of biliary obstruction normal.    Medications:  Continuous Infusions:  Scheduled Meds:   amLODIPine  5 mg Oral Daily    famotidine  20 mg Oral BID    gabapentin  300 mg Oral TID    oxybutynin  5 mg Oral BID    primidone  100 mg Oral TID    sertraline  100 mg Oral Daily     PRN Meds:acetaminophen, glucagon (human recombinant), glucose, glucose, magnesium oxide, magnesium oxide, melatonin, naloxone, ondansetron, senna-docusate 8.6-50 mg, sodium chloride 0.9%     Review of patient's allergies indicates:   Allergen Reactions    Iodine and iodide containing products      Objective:     Vital Signs (Most Recent):  Temp: 98.5 °F (36.9 °C) (12/14/22 0739)  Pulse: 73 (12/14/22 0739)  Resp: 19 (12/14/22 0739)  BP: 127/65 (12/14/22 0739)  SpO2: (!) 94 % (12/14/22 0739)   Vital Signs (24h Range):  Temp:  [97.9 °F (36.6 °C)-98.5 °F (36.9 °C)] 98.5 °F (36.9 °C)  Pulse:  [66-75] 73  Resp:  [15-19] 19  SpO2:  [92 %-97 %] 94 %  BP: (120-142)/(58-67) 127/65     Weight: 70.5 kg (155 lb 6.8 oz)  Body mass index is 25.09 kg/m².    Intake/Output - Last 3 Shifts         12/12 0700  12/13 0659 12/13 0700  12/14 0659 12/14 0700  12/15 0659    P.O. 840 720 120    IV Piggyback       Total Intake(mL/kg) 840 (11.9) 720 (10.2) 120 (1.7)    Net +840 +720 +120           Urine Occurrence 6 x 6 x     Stool Occurrence 0 x                Significant Labs:  I have reviewed all pertinent lab results within the past 24 hours.  CBC:   Recent Labs   Lab 12/14/22  0341   WBC 8.60   RBC 4.75   HGB 13.0   HCT 39.4      MCV 83   MCH 27.4   MCHC 33.0     CMP:   Recent Labs   Lab 12/14/22  0340   GLU 99   CALCIUM 8.8   ALBUMIN 3.0*   PROT 6.2      K 4.3   CO2 29      BUN 13   CREATININE 0.7   ALKPHOS 75   ALT 15   AST 17   BILITOT 0.2       Significant Diagnostics:  I have reviewed all pertinent imaging results/findings within the past 24  hours.

## 2022-12-14 NOTE — ASSESSMENT & PLAN NOTE
73 yo F with multiple medical co-morbidities who presents with acute onset SURYA/Chest pain with imaging showing cholelithiasis without evidence of acute cholecystitis and a distended gallbladder. No evidence of choledocholithiasis or acute cholecystitis on lab work. Exam and complaints atypical for acute eliot presentation.     - HIDA scan with no evidence of obstruction or abnormality within the biliary system  - No signs/symptoms of acute cholecystitis. Patient does have sludge and symptoms consistent with biliary dyskinesia vs symptomatic cholelithiasis. Would benefit from cholecystectomy as an outpatient.  - Recommend outpatient follow up with General Surgery  - No indication for antibiotics from gallbladder standpoint, antibiotics per primary for pulmonary findings  - Rest of care per primary

## 2022-12-14 NOTE — ASSESSMENT & PLAN NOTE
Questionable finding of possible cholecystitis on CT abd/pelvis.  Abd US less revealing and recommendation for HIDA scan to further evaluate  -Cannot get HIDA scan today due to recent V/Q scan for possible PE on admission    -surgery consulted, HIDA scan done and negative

## 2022-12-14 NOTE — PROGRESS NOTES
[facility]  Telemedicine Progress Note    Patient Name: Elsa Steel  MRN: [id number]  Patient Class: OP- Observation   Admission Date: 12/11/2022  Length of Stay: 0 days  Attending Physician: Jam Brooke MD  Primary Care Provider: David Olson MD          Subjective:     Principal Problem:Abdominal pain        HPI:  Elsa Steel 72 y.o. female with essential tremor, HTN, presents to the hospital with a chief complaint of abdominal pain.  She reports sudden onset epigastric abdominal pain with radiation to her chest began this moaning with associated nausea and vomiting.  She has not attempted any treatment home.  She denies any aggravating alleviating factors.  She describes as constant stabbing pain.  She reports this never occurred before.  She denies fever shortness of breath leg swelling syncope dizziness dysuria melena hematuria hematemesis.  Her last bowel movement was this morning.  She does not take any blood thinners.    In the ED, afebrile without leukocytosis lactic acid negative BNP negative troponin negative creatinine 0.7 the scan Q scan low probability of pulmonary embolism CT head without acute intracranial abnormalities stable positioning of deep brain stimulator CT chest abdomen pelvis without contrast suggestive of acute cholecystitis including cholelithiasis bilateral dependent lower traction bronchiectasis in airspace disease.      Overview/Hospital Course:  No notes on file    Interval History: patient seen after HIDA scan, tolerated lunch. Still with some abdominal discomfort. HIDA negative, per gen surg, no intervention today.     Review of Systems   Gastrointestinal:  Positive for abdominal pain.   Objective:     Vital Signs (Most Recent):  Temp: 98 °F (36.7 °C) (12/13/22 1532)  Pulse: 66 (12/13/22 1532)  Resp: 18 (12/13/22 1532)  BP: 127/60 (12/13/22 1532)  SpO2: 96 % (12/13/22 1532) Vital Signs (24h Range):  Temp:  [97.5 °F (36.4 °C)-98.2 °F  (36.8 °C)] 98 °F (36.7 °C)  Pulse:  [63-85] 66  Resp:  [15-19] 18  SpO2:  [88 %-96 %] 96 %  BP: (127-149)/(60-83) 127/60     Weight: 70.5 kg (155 lb 6.8 oz)  Body mass index is 25.09 kg/m².    Intake/Output Summary (Last 24 hours) at 12/13/2022 1935  Last data filed at 12/13/2022 1706  Gross per 24 hour   Intake 1320 ml   Output --   Net 1320 ml      Physical Exam  Vitals reviewed.   Constitutional:       Appearance: Normal appearance.   HENT:      Head: Normocephalic and atraumatic.   Eyes:      General: No scleral icterus.     Conjunctiva/sclera: Conjunctivae normal.   Pulmonary:      Effort: Pulmonary effort is normal. No respiratory distress.   Skin:     Coloration: Skin is not jaundiced.      Findings: No erythema.   Psychiatric:         Mood and Affect: Mood normal.         Behavior: Behavior normal.       Significant Labs: All pertinent labs within the past 24 hours have been reviewed.  Recent Lab Results       None            Significant Imaging: I have reviewed all pertinent imaging results/findings within the past 24 hours.      Assessment/Plan:      * Abdominal pain  Presents with abdominal pain nausea and vomiting. CT abdomen with findings suggestive of acute cholecystitis and cholelithiasis, also with bilateral lower love bronchiectasis and airspace disease.   Discussed with general surgery by ED recommending US abdomen  US abd with cholelithiasis and sludge, but no definitve findings of cholecystitis  -abd pain improved    -surgery consulted, HIDA scan done today (negative)    Calculus of gallbladder with acute cholecystitis without obstruction  Questionable finding of possible cholecystitis on CT abd/pelvis.  Abd US less revealing and recommendation for HIDA scan to further evaluate  -Cannot get HIDA scan today due to recent V/Q scan for possible PE on admission    -surgery consulted, HIDA scan done and negative    HTN (hypertension)  Continue home amlodipine    Pulmonary nodules  Seen on CT abdomen.  Will recommend continued outpatient follow up with pulmonology.     Depression  Continue home sertraline.     Essential tremor  Continue home primidone. Pt has DBS in place. Called radiology to confirm this would not be an issue with pt having HIDA scan. Appears to not be an issue      VTE Risk Mitigation (From admission, onward)         Ordered     IP VTE HIGH RISK PATIENT  Once         12/11/22 1549     Place sequential compression device  Until discontinued         12/11/22 1549     Place DEANNA hose  Until discontinued         12/11/22 1549                      I have completed this tele-visit without the assistance of a telepresenter.    The attending portion of this evaluation, treatment, and documentation was performed per Jam Brooke MD via Telemedicine AudioVisual using the secure [employer] software platform with 2 way audio/video. The provider was located off-site and the patient is located in the hospital. The aforementioned video software was utilized to document the relevant history and physical exam    Jam Brooke MD  Department of Hospital Medicine   [facility]

## 2022-12-14 NOTE — ASSESSMENT & PLAN NOTE
Presents with abdominal pain nausea and vomiting. CT abdomen with findings suggestive of acute cholecystitis and cholelithiasis, also with bilateral lower love bronchiectasis and airspace disease.   Discussed with general surgery by ED recommending US abdomen  US abd with cholelithiasis and sludge, but no definitve findings of cholecystitis  -abd pain improved    -surgery consulted, HIDA scan done today (negative)

## 2022-12-14 NOTE — PLAN OF CARE
12/14/22 1106   Final Note   Assessment Type Final Discharge Note   Anticipated Discharge Disposition Home   Hospital Resources/Appts/Education Provided Appointments scheduled and added to AVS   Post-Acute Status   Post-Acute Authorization Other   Other Status No Post-Acute Service Needs     Pts nurse Marielos notified that the pt can d/c from CM standpoint

## 2022-12-14 NOTE — PLAN OF CARE
Problem: Adult Inpatient Plan of Care  Goal: Plan of Care Review  Outcome: Ongoing, Progressing  Goal: Patient-Specific Goal (Individualized)  Outcome: Ongoing, Progressing  Goal: Absence of Hospital-Acquired Illness or Injury  Outcome: Ongoing, Progressing  Goal: Optimal Comfort and Wellbeing  Outcome: Ongoing, Progressing  Goal: Readiness for Transition of Care  Outcome: Ongoing, Progressing     Problem: Pain Acute  Goal: Acceptable Pain Control and Functional Ability  Outcome: Ongoing, Progressing  Intervention: Develop Pain Management Plan  Flowsheets (Taken 12/14/2022 0431)  Pain Management Interventions:   care clustered   pillow support provided  Intervention: Prevent or Manage Pain  Flowsheets (Taken 12/14/2022 0431)  Sensory Stimulation Regulation:   quiet environment promoted   care clustered  Bowel Elimination Promotion: privacy promoted  Medication Review/Management: medications reviewed     Problem: Infection  Goal: Absence of Infection Signs and Symptoms  Outcome: Ongoing, Progressing  Intervention: Prevent or Manage Infection  Flowsheets (Taken 12/14/2022 0431)  Fever Reduction/Comfort Measures: lightweight bedding  Isolation Precautions:   protective   precautions maintained

## 2022-12-14 NOTE — NURSING
Waiting for patient's oxygen to be delivered.     1414: Patient does not qualify for home oxygen. Patient updated, and aware she does not need oxygen.    1420: Patient received AVS, no further questions. Waiting for son to come get her.

## 2022-12-14 NOTE — NURSING
Testing for home O2    O2 sats on RA at rest= 95%    O2 sats on RA with exercise= 88%    O2 sats on 1L O2 with exercise= 96%

## 2022-12-14 NOTE — PLAN OF CARE
Problem: Adult Inpatient Plan of Care  Goal: Plan of Care Review  Outcome: Met  Goal: Patient-Specific Goal (Individualized)  Outcome: Met  Goal: Absence of Hospital-Acquired Illness or Injury  Outcome: Met  Goal: Optimal Comfort and Wellbeing  Outcome: Met  Goal: Readiness for Transition of Care  Outcome: Met     Problem: Pain Acute  Goal: Acceptable Pain Control and Functional Ability  Outcome: Met     Problem: Infection  Goal: Absence of Infection Signs and Symptoms  Outcome: Met

## 2022-12-14 NOTE — SUBJECTIVE & OBJECTIVE
Interval History: patient seen after HIDA scan, tolerated lunch. Still with some abdominal discomfort. HIDA negative, per gen surg, no intervention today.     Review of Systems   Gastrointestinal:  Positive for abdominal pain.   Objective:     Vital Signs (Most Recent):  Temp: 98 °F (36.7 °C) (12/13/22 1532)  Pulse: 66 (12/13/22 1532)  Resp: 18 (12/13/22 1532)  BP: 127/60 (12/13/22 1532)  SpO2: 96 % (12/13/22 1532) Vital Signs (24h Range):  Temp:  [97.5 °F (36.4 °C)-98.2 °F (36.8 °C)] 98 °F (36.7 °C)  Pulse:  [63-85] 66  Resp:  [15-19] 18  SpO2:  [88 %-96 %] 96 %  BP: (127-149)/(60-83) 127/60     Weight: 70.5 kg (155 lb 6.8 oz)  Body mass index is 25.09 kg/m².    Intake/Output Summary (Last 24 hours) at 12/13/2022 1935  Last data filed at 12/13/2022 1706  Gross per 24 hour   Intake 1320 ml   Output --   Net 1320 ml      Physical Exam  Vitals reviewed.   Constitutional:       Appearance: Normal appearance.   HENT:      Head: Normocephalic and atraumatic.   Eyes:      General: No scleral icterus.     Conjunctiva/sclera: Conjunctivae normal.   Pulmonary:      Effort: Pulmonary effort is normal. No respiratory distress.   Skin:     Coloration: Skin is not jaundiced.      Findings: No erythema.   Psychiatric:         Mood and Affect: Mood normal.         Behavior: Behavior normal.       Significant Labs: All pertinent labs within the past 24 hours have been reviewed.  Recent Lab Results       None            Significant Imaging: I have reviewed all pertinent imaging results/findings within the past 24 hours.

## 2022-12-14 NOTE — PROGRESS NOTES
[facility] [place] [facility]  General Surgery  Progress Note    Subjective:     History of Present Illness:  73 yo F with history of HTN, essential tremors, depression who presents to ED with one day history of epigastric pain. Pain was sudden in onset and described as sharp with radiation into her chest. She has has associated nausea and vomiting. Denies fevers or chills. Having normal bowel movements without constipation or diarrhea. Denies any episodes of similar pain in the past. Work up in ED ruled out ACS and PE as possible etiologies of her pain. CT imaging showed findings consistent with possible aspiration/early pneumonia as well as a distended gallbladder with no associated ductal dilation. Ultrasound showed cholelithiasis without evidence of acute cholecystitis (normal duct, no wall thickening, no pericholecystic fluid), negative Almeida's sign. Lab work relatively unremarkable, no leukocytosis (no left shift), normal LFTs, normal lipase. General Surgery was consulted for possible cholecystectomy.       Post-Op Info:  * No surgery found *         Interval History: HIDA performed yesterday with no evidence of obstruction. Afebrile. WBC and markers of biliary obstruction normal.    Medications:  Continuous Infusions:  Scheduled Meds:   amLODIPine  5 mg Oral Daily    famotidine  20 mg Oral BID    gabapentin  300 mg Oral TID    oxybutynin  5 mg Oral BID    primidone  100 mg Oral TID    sertraline  100 mg Oral Daily     PRN Meds:acetaminophen, glucagon (human recombinant), glucose, glucose, magnesium oxide, magnesium oxide, melatonin, naloxone, ondansetron, senna-docusate 8.6-50 mg, sodium chloride 0.9%     Review of patient's allergies indicates:   Allergen Reactions    Iodine and iodide containing products      Objective:     Vital Signs (Most Recent):  Temp: 98.5 °F (36.9 °C) (12/14/22 0739)  Pulse: 73 (12/14/22 0739)  Resp: 19 (12/14/22 0739)  BP: 127/65 (12/14/22 0739)  SpO2: (!) 94 % (12/14/22 0739)    Vital Signs (24h Range):  Temp:  [97.9 °F (36.6 °C)-98.5 °F (36.9 °C)] 98.5 °F (36.9 °C)  Pulse:  [66-75] 73  Resp:  [15-19] 19  SpO2:  [92 %-97 %] 94 %  BP: (120-142)/(58-67) 127/65     Weight: 70.5 kg (155 lb 6.8 oz)  Body mass index is 25.09 kg/m².    Intake/Output - Last 3 Shifts         12/12 0700  12/13 0659 12/13 0700 12/14 0659 12/14 0700  12/15 0659    P.O. 840 720 120    IV Piggyback       Total Intake(mL/kg) 840 (11.9) 720 (10.2) 120 (1.7)    Net +840 +720 +120           Urine Occurrence 6 x 6 x     Stool Occurrence 0 x                Significant Labs:  I have reviewed all pertinent lab results within the past 24 hours.  CBC:   Recent Labs   Lab 12/14/22  0341   WBC 8.60   RBC 4.75   HGB 13.0   HCT 39.4      MCV 83   MCH 27.4   MCHC 33.0     CMP:   Recent Labs   Lab 12/14/22  0340   GLU 99   CALCIUM 8.8   ALBUMIN 3.0*   PROT 6.2      K 4.3   CO2 29      BUN 13   CREATININE 0.7   ALKPHOS 75   ALT 15   AST 17   BILITOT 0.2       Significant Diagnostics:  I have reviewed all pertinent imaging results/findings within the past 24 hours.    Assessment/Plan:     * Abdominal pain  73 yo F with multiple medical co-morbidities who presents with acute onset SURYA/Chest pain with imaging showing cholelithiasis without evidence of acute cholecystitis and a distended gallbladder. No evidence of choledocholithiasis or acute cholecystitis on lab work. Exam and complaints atypical for acute eliot presentation.     - HIDA scan with no evidence of obstruction or abnormality within the biliary system  - No signs/symptoms of acute cholecystitis. Patient does have sludge and symptoms consistent with biliary dyskinesia vs symptomatic cholelithiasis. Would benefit from cholecystectomy as an outpatient.  - Recommend outpatient follow up with General Surgery  - No indication for antibiotics from gallbladder standpoint, antibiotics per primary for pulmonary findings  - Rest of care per primary        Luis FREITAS  MD Dipak  General Surgery  [facility] - [place] - Telemetry

## 2022-12-15 ENCOUNTER — TELEPHONE (OUTPATIENT)
Dept: OTOLARYNGOLOGY | Facility: CLINIC | Age: 72
End: 2022-12-15
Payer: MEDICARE

## 2022-12-15 ENCOUNTER — PATIENT MESSAGE (OUTPATIENT)
Dept: OTOLARYNGOLOGY | Facility: CLINIC | Age: 72
End: 2022-12-15
Payer: MEDICARE

## 2022-12-15 ENCOUNTER — OFFICE VISIT (OUTPATIENT)
Dept: NEUROLOGY | Facility: CLINIC | Age: 72
End: 2022-12-15
Payer: MEDICARE

## 2022-12-15 VITALS
WEIGHT: 154.44 LBS | BODY MASS INDEX: 24.82 KG/M2 | HEART RATE: 79 BPM | SYSTOLIC BLOOD PRESSURE: 145 MMHG | DIASTOLIC BLOOD PRESSURE: 85 MMHG | HEIGHT: 66 IN

## 2022-12-15 DIAGNOSIS — J38.3 SPASMODIC DYSPHONIA: Primary | ICD-10-CM

## 2022-12-15 DIAGNOSIS — G25.0 ESSENTIAL TREMOR: ICD-10-CM

## 2022-12-15 DIAGNOSIS — R49.0 DYSPHONIA: ICD-10-CM

## 2022-12-15 LAB
BACTERIA BLD CULT: NORMAL
BACTERIA BLD CULT: NORMAL

## 2022-12-15 PROCEDURE — 3079F DIAST BP 80-89 MM HG: CPT | Mod: CPTII,S$GLB,, | Performed by: PSYCHIATRY & NEUROLOGY

## 2022-12-15 PROCEDURE — 1101F PR PT FALLS ASSESS DOC 0-1 FALLS W/OUT INJ PAST YR: ICD-10-PCS | Mod: CPTII,S$GLB,, | Performed by: PSYCHIATRY & NEUROLOGY

## 2022-12-15 PROCEDURE — 3077F SYST BP >= 140 MM HG: CPT | Mod: CPTII,S$GLB,, | Performed by: PSYCHIATRY & NEUROLOGY

## 2022-12-15 PROCEDURE — 99999 PR PBB SHADOW E&M-EST. PATIENT-LVL III: CPT | Mod: PBBFAC,,, | Performed by: PSYCHIATRY & NEUROLOGY

## 2022-12-15 PROCEDURE — 3008F BODY MASS INDEX DOCD: CPT | Mod: CPTII,S$GLB,, | Performed by: PSYCHIATRY & NEUROLOGY

## 2022-12-15 PROCEDURE — 95983 PR ELEC ANALYSIS, IMPLT NEURO PULSE GEN, W/PRGRM, BRAIN, 1ST 15 MINS: ICD-10-PCS | Mod: S$GLB,,, | Performed by: PSYCHIATRY & NEUROLOGY

## 2022-12-15 PROCEDURE — 95983 ALYS BRN NPGT PRGRMG 15 MIN: CPT | Mod: S$GLB,,, | Performed by: PSYCHIATRY & NEUROLOGY

## 2022-12-15 PROCEDURE — 95984 PR ELEC ANALYSIS, IMPLT NEURO PULSE GEN, W/PRGRM, BRAIN,  EA ADDTL 15 MINS: ICD-10-PCS | Mod: S$GLB,,, | Performed by: PSYCHIATRY & NEUROLOGY

## 2022-12-15 PROCEDURE — 3008F PR BODY MASS INDEX (BMI) DOCUMENTED: ICD-10-PCS | Mod: CPTII,S$GLB,, | Performed by: PSYCHIATRY & NEUROLOGY

## 2022-12-15 PROCEDURE — 99215 PR OFFICE/OUTPT VISIT, EST, LEVL V, 40-54 MIN: ICD-10-PCS | Mod: 25,S$GLB,, | Performed by: PSYCHIATRY & NEUROLOGY

## 2022-12-15 PROCEDURE — 1126F PR PAIN SEVERITY QUANTIFIED, NO PAIN PRESENT: ICD-10-PCS | Mod: CPTII,S$GLB,, | Performed by: PSYCHIATRY & NEUROLOGY

## 2022-12-15 PROCEDURE — 1126F AMNT PAIN NOTED NONE PRSNT: CPT | Mod: CPTII,S$GLB,, | Performed by: PSYCHIATRY & NEUROLOGY

## 2022-12-15 PROCEDURE — 3077F PR MOST RECENT SYSTOLIC BLOOD PRESSURE >= 140 MM HG: ICD-10-PCS | Mod: CPTII,S$GLB,, | Performed by: PSYCHIATRY & NEUROLOGY

## 2022-12-15 PROCEDURE — 1159F PR MEDICATION LIST DOCUMENTED IN MEDICAL RECORD: ICD-10-PCS | Mod: CPTII,S$GLB,, | Performed by: PSYCHIATRY & NEUROLOGY

## 2022-12-15 PROCEDURE — 1101F PT FALLS ASSESS-DOCD LE1/YR: CPT | Mod: CPTII,S$GLB,, | Performed by: PSYCHIATRY & NEUROLOGY

## 2022-12-15 PROCEDURE — 99215 OFFICE O/P EST HI 40 MIN: CPT | Mod: 25,S$GLB,, | Performed by: PSYCHIATRY & NEUROLOGY

## 2022-12-15 PROCEDURE — 99999 PR PBB SHADOW E&M-EST. PATIENT-LVL III: ICD-10-PCS | Mod: PBBFAC,,, | Performed by: PSYCHIATRY & NEUROLOGY

## 2022-12-15 PROCEDURE — 95984 ALYS BRN NPGT PRGRMG ADDL 15: CPT | Mod: S$GLB,,, | Performed by: PSYCHIATRY & NEUROLOGY

## 2022-12-15 PROCEDURE — 3288F FALL RISK ASSESSMENT DOCD: CPT | Mod: CPTII,S$GLB,, | Performed by: PSYCHIATRY & NEUROLOGY

## 2022-12-15 PROCEDURE — 3288F PR FALLS RISK ASSESSMENT DOCUMENTED: ICD-10-PCS | Mod: CPTII,S$GLB,, | Performed by: PSYCHIATRY & NEUROLOGY

## 2022-12-15 PROCEDURE — 1159F MED LIST DOCD IN RCRD: CPT | Mod: CPTII,S$GLB,, | Performed by: PSYCHIATRY & NEUROLOGY

## 2022-12-15 PROCEDURE — 3079F PR MOST RECENT DIASTOLIC BLOOD PRESSURE 80-89 MM HG: ICD-10-PCS | Mod: CPTII,S$GLB,, | Performed by: PSYCHIATRY & NEUROLOGY

## 2022-12-15 NOTE — PROGRESS NOTES
"Movement Eval    Interval Hx    Since last visit,     B/L VIM DBS    Since last visit  Continues to have major tremor control    She tried several groups- B is best for tremor - no SFX any groups  A - OLD  B - 0 POS  C - 1b ON  D - 2 neg    No imbalance No falls  L hand better controlled than R    Likes to craft    Turns off DBS at night      Some insomnia  Continues primidone 2 tab TID  and gabapentin      "Hpi:  72 yo woman with hx HTN, ET coming for tremor and DBS eval. She notes hand tremors since age 28's. She notes a b/l hand tremor starting slightly in youth and progressive very slowly. A this point she struggles to do eyemakeup. Struggle to do her hair. Spills food often and she is embarrassed to go out even with weighted silverware. Cannot type. Struggles to text message. Likes to watercolor. In the last year she has a mouth and leg tremor. 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 No falls.   Her speech has changed in the last  6-8mos. Can go to a whisper.  At times when she writes her neck and hand muscles feel tight.    No ETOH sensitivity    Father had hand tremors at age 40's - no issues with gait  Paternal grandmother also with tremors    3 children with termor    MRI brain 2021 NL    Med hx  Has tried propranolol 80mg  Takes primidone 150mg and this  Gabapentin failed"      PD Review of Symptoms:  Anosmia: none  Dysarthria/Hypophonia: some scanning sperech  Dysphagia/Sialorrhea: none  Depression: longstanding  Cognitive slowing: none  Hallucinations: several times saw an insect crawling  Urinary changes: mild incontinence  Constipation: None  Orthostasis: mild  Falls: none  Freezing: none  Micrographia: none  Sleep issues:  -RBD: none      Past Medical History:   Diagnosis Date    Dyslipidemia     HTN (hypertension)     Tremor        PAST SURGICAL HISTORY:  Past Surgical History:   Procedure Laterality Date    DEEP BRAIN STIMULATOR PLACEMENT Bilateral 1/4/2022    " Procedure: INSERTION, DEEP BRAIN STIMULATOR BILATERAL VIM;  Surgeon: Neal Hernández MD;  [facility];  Service: Neurosurgery;  Laterality: Bilateral;  BILATERAL (LEFT FIRST, RIGHT SECOND)  INSERTION ELECTRODES FOR DEEP BRAIN STIMULATION IN VENTRALIS INTERMEDIUS/TEDS & SCD/ MEDTRONICS, ALYSSIA WHITE. CO SURGERY WITH DR JT MITCHELL.    ESOPHAGOGASTRODUODENOSCOPY N/A 2021    Procedure: EGD (ESOPHAGOGASTRODUODENOSCOPY);  Surgeon: Juan R Watson MD;  Location: [facility];  Service: Endoscopy;  Laterality: N/A;  covid test  [place], instr portal -ml    HAND SURGERY      HYSTERECTOMY      INSERTION OF DEEP BRAIN STIMULATOR GENERATOR Left 2022    Procedure: INSERTION, PULSE GENERATOR, DEEP BRAIN STIMULATOR;  Surgeon: Neal Hernández MD;  [facility];  Service: Neurosurgery;  Laterality: Left;  INSERT PULSE GENERATOR FOR DEEP BRAIN STIMULATION/ TEDS & SCD/ MEDTRONICS, ALYSSIA WHITE.    PLACEMENT OF FIDUCIAL SCREW INTO SPINE N/A 2022    Procedure: INSERTION, FIDUCIAL SCREW, SKULL;  Surgeon: Neal Hernández MD;  [facility];  Service: Neurosurgery;  Laterality: N/A;  INSERTION FIDUCIAL SCREWS FOR DEEP BRAIN STIMULATION/ TEDS & ACD/ MEDTRONICS, ALYSSIA WHITE/ CO SURGERY WITH DR JT MITCHELL       SOCIAL HISTORY:  Social History     Socioeconomic History    Marital status: Single   Tobacco Use    Smoking status: Former     Packs/day: 2.00     Years: 30.00     Pack years: 60.00     Types: Cigarettes     Start date: 1968     Quit date: 1999     Years since quittin.9    Smokeless tobacco: Never   Substance and Sexual Activity    Alcohol use: Not Currently    Drug use: Never       FAMILY HISTORY:  Family History   Problem Relation Age of Onset    Alzheimer's disease Mother     Cancer Father        ALLERGIES AND MEDICATIONS: updated and reviewed.  Review of patient's allergies indicates:   Allergen Reactions    Iodine and iodide containing products      Current  "Outpatient Medications   Medication Sig Dispense Refill    amLODIPine (NORVASC) 5 MG tablet Take 5 mg by mouth once daily.      famotidine (PEPCID) 20 MG tablet Take 1 tablet (20 mg total) by mouth 2 (two) times daily. 60 tablet 11    gabapentin (NEURONTIN) 300 MG capsule Take 300 mg by mouth 3 (three) times daily.      oxybutynin (DITROPAN) 5 MG Tab Take 5 mg by mouth 2 (two) times daily.      sertraline (ZOLOFT) 100 MG tablet Take 100 mg by mouth once daily.      triamcinolone acetonide 0.1% (KENALOG) 0.1 % ointment Apply topically 2 (two) times daily. 15 g 1    primidone (MYSOLINE) 50 MG Tab Take 2 tablets (100 mg total) by mouth 3 (three) times daily. 180 tablet 11     No current facility-administered medications for this visit.       Review of Systems   Constitutional:  Negative for activity change, appetite change, fever and unexpected weight change.   HENT:  Positive for trouble swallowing. Negative for voice change.    Eyes:  Negative for photophobia and visual disturbance.   Respiratory:  Negative for apnea and shortness of breath.    Cardiovascular:  Negative for chest pain and leg swelling.   Gastrointestinal:  Negative for constipation and nausea.   Genitourinary:  Negative for difficulty urinating.   Musculoskeletal:  Negative for back pain, gait problem and neck pain.   Skin:  Negative for color change and pallor.   Neurological:  Positive for tremors. Negative for dizziness, seizures, syncope, weakness and numbness.   Hematological:  Negative for adenopathy.   Psychiatric/Behavioral:  Positive for dysphoric mood. Negative for agitation, confusion and decreased concentration.      PHYSICAL:  BP (!) 145/85 (BP Location: Left arm, Patient Position: Sitting, BP Method: Medium (Automatic))   Pulse 79   Ht 5' 6" (1.676 m)   Wt 70.1 kg (154 lb 6.9 oz)   BMI 24.93 kg/m²     General Medical Examination:  General: The patient is alert and cooperative with the exam.   HEENT: Normocephalic, atraumatic. "   Neck: Supple.   Chest: Unlabored breathing.   CV: Symmetric pulses.   Ext: No clubbing, cyanosis, or edema.     Mental Status:  General: Good hygiene, appropriate appearance.  Mood/Affect: Appropriate/congruent.  Level of consciousness: Awake, alert.  Orientation: Oriented to person, place, time and situation.  Language: Mild scanning    Cranial nerves:  I: Not tested  II: PERRL, VFF to counting  III, IV, VI: EOMI with conjugate gaze and no nystagmus on end gaze  V: Facial sensation intact and symmetric over the bilateral V1-V3  VII: Facial muscle activation intact and symmetric over the bilateral upper and lower face  VIII: Hearing intact in the b/l ears and symmetrical to finger rub  IX, X, XII: TUP midline  X: SCMs and shoulder shrug full strength b/l and symmetric    Motor:   Strength Intact throughout upper and lower extremities, 5/5.    DTRs:  ? Biceps Triceps Brachioradialis Knee Ankle   Left 3+ 2+ 2+ 3+    Right 3+ 2+ 2+ 3+      No cervical dystonia  At times her voice cuts out to a whisper    ? Finger taps Finger flicks BULL Heel taps   Left 0 0 0 0   Right 0 0 0 0   Neck tone: 0  ? Arm Leg   Left 0 0   Right 0 0     Sensation:   -Light touch: Intact and symmetric in the bilateral upper and lower extremities.    Coordination:   -Finger to nose: nl  -BULL OK    Gait:  NL  -Arm Swing: mild decreased R        Tremor Exam   Arms extended Arms in wing position, fingers almost touching Re-emergent Arms extended wrists extended Intention Resting Kinetic   Left ?+ ? ?+ ? ?+ ? ?   Right ?+ ? ?+ ? ?+ ? ?   Head tremor: No-No  NEG       Archimedes Spirals   Left ?+   Right ?+       12/15/22 DBS  Procedure:  Electrode type:  Neurostimulator type: Percept    Battery information: status ok.   Electrode impedance check:  Left hemisphere: No electrodes out of range at 1.0mA.  Right hemisphere: No electrodes out of range at 1.0mA.    Initial -    Group A - OLD  left VIM, C positive, 1 negative.  Amplitude 1.3  mA  60/120     Group B - block 0 - ON  left VIM, 0 positive, 1 negative.  Amplitude 1.5 mA  60/120    Group C- 1b segment mode  1b ON - parasthesias mild - BEST  left VIM,   Amplitude 1.5 mA  60/120    Group D - 2ringmode   left VIM,   C+ 2- ringmode  Amplitude 1.5 mA  60/120    R VIM same between groups  right VIM, C positive, 9 negative.  Amplitude 1.3 mA  60/120    Programming  Copied group B to C and D for better R hand tremor control    Group C- High freq  left VIM, 0 positive, 1 negative.  Amplitude 1.5 mA  60/120->160    Group D - segment 2 levels unprotected  left VIM,   1b 1.5mA  2b 0.2mA  Amplitude 1.5 mA  60/120    R VIM same between groups  right VIM, C positive, 9 negative.  Amplitude 1.3 mA  60/120    FINAL   Group A - OLD  left VIM, C positive, 1 negative.  Amplitude 1.3 mA  60/120     Group B - block 0 - ON  left VIM, 0 positive, 1 negative.  Amplitude 1.5 mA  60/120    Group C- High freq - ON  left VIM, 0 positive, 1 negative.  Amplitude 1.5 mA  60/120->160    Group D - segment 2 levels unprotected  left VIM,   1b 1.5mA  2b 0.2mA  Amplitude 1.5 mA  60/120    R VIM same between groups  right VIM, C positive, 9 negative.  Amplitude 1.3 mA  60/120      --------------------------------------  5/12/22 DBS  Procedure:  Electrode type:  Neurostimulator type: Percept    Battery information: status ok.   Electrode impedance check:  Left hemisphere: No electrodes out of range at 1.0mA.  Right hemisphere: No electrodes out of range at 1.0mA.    Initial settings:  left VIM, C positive, 1 negative.  Amplitude 1.3 mA  60/120    right VIM, C positive, 9 negative.  Amplitude 1.3 mA  60/120    Programming - appears to have scanning speech and intention tremor R>L when stim ON- program to block cerebellar tracts    Turned stim OFF- speech better  B/L hand tremors come back    Copied Group A to B - block 0  left VIM, 0 positive, 1 negative.  Amplitude 1.5 mA  60/120  Postural tremor controlled  Intention tremor  remains    Segments  1a ON - parasthesias mild  left VIM,   Amplitude 1.5 mA  60/120  Postural tremor controlled  Intention tremor remains    1b ON - parasthesias mild - BEST  left VIM,   Amplitude 1.5 mA  60/120  Postural tremor controlled  Intention tremor better    1c ON- parasthesias mild  left VIM,   Amplitude 1.5 mA  60/120  Postural tremor controlled  Intention tremor remains  (maybe worse)    Made 1b Group C  1b ON - parasthesias mild - BEST  left VIM,   Amplitude 1.5 mA  60/120  Postural tremor controlled  Intention tremor better    Group D - 2ringmode to get away from cerebellar tracts  left VIM,   C+ 2- ringmode  Amplitude 1.5 mA  60/120  Postural tremor controlled  Intention tremor better    FINAL   Group A - OLD  left VIM, C positive, 1 negative.  Amplitude 1.3 mA  60/120     Group B - block 0  left VIM, 0 positive, 1 negative.  Amplitude 1.5 mA  60/120    Group C- 1b segment mode  1b ON - parasthesias mild - BEST  left VIM,   Amplitude 1.5 mA  60/120    Group D - 2ringmode   left VIM,   C+ 2- ringmode  Amplitude 1.5 mA  60/120    R VIM same between groups  right VIM, C positive, 9 negative.  Amplitude 1.3 mA  60/120  _______________________________________  Prior Procedure:  Electrode type:  Neurostimulator type: Percept    Battery information: status ok.   Electrode impedance check:  Left hemisphere: No electrodes out of range at 1.0mA.  Right hemisphere: No electrodes out of range at 1.0mA.    LEFT  Pulse width set at 60; Frequency set at 120  left VIM Monopolar review  C&3 @1.5mA- No paraesthesias   Spirals No better  C&2 @1.5mA- No paraesthesias   Spirals Better  C&1 @1.3mA- Mild fleeting R arm paraesthesias   Spirals better  @1.5mA- Mild fleeting R arm paraesthesias   Spirals same  C&0 @1.0mA- Mild fleeting R arm paraesthesias   Spirals MUCH better (may be best contact but parasthesias stronger)    RIGHT  Pulse width set at 60; Frequency set at 120  right VIM Monopolar review  C&11 @1mA - dizzy  "feeling  C&10 @1.0mA  C&9 @0.5mA - strong fleeting parasthesias - not help to tremor   @1.0mA - great tremor control   @1.3mA - BEST tremor control  C&8 @0.5mA - strong fleeting parasthesias  @0.7mA good tremor control    Final settings:  left VIM, C positive, 1 negative.  Amplitude 1.3 mA  60/120    right VIM, C positive, 9 negative.  Amplitude 1.3 mA  60/120        Vitals:    12/15/22 0806   BP: (!) 145/85   BP Location: Left arm   Patient Position: Sitting   BP Method: Medium (Automatic)   Pulse: 79   Weight: 70.1 kg (154 lb 6.9 oz)   Height: 5' 6" (1.676 m)       Assessment & Plan:    Problem List Items Addressed This Visit          Neuro    Essential tremor    Overview     Follow by Dr. Foss and Patricia.  Scheduled for DBS         Current Assessment & Plan     Typical fam hx and exam consistent with a debilitating essential tremor.  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

## 2022-12-15 NOTE — TELEPHONE ENCOUNTER
----- Message from Tremontana Chevalier sent at 12/12/2022  3:17 PM CST -----  Regarding: appt   Pt calling to spk with someone in Dr. Foss's office to reschedule 12/15 appt. Pt is currently in hosp on [facility] and may have gallbladder removed. Pls call pt @ [phone] for new appt. Daughter's number is Stephanie @ [phone].

## 2022-12-15 NOTE — ASSESSMENT & PLAN NOTE
Typical fam hx and exam consistent with a debilitating essential tremor.  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

## 2022-12-27 ENCOUNTER — OFFICE VISIT (OUTPATIENT)
Dept: SURGERY | Facility: CLINIC | Age: 72
End: 2022-12-27
Payer: MEDICARE

## 2022-12-27 VITALS
DIASTOLIC BLOOD PRESSURE: 77 MMHG | HEART RATE: 79 BPM | HEIGHT: 66 IN | OXYGEN SATURATION: 97 % | SYSTOLIC BLOOD PRESSURE: 112 MMHG | BODY MASS INDEX: 25.07 KG/M2 | WEIGHT: 156 LBS

## 2022-12-27 DIAGNOSIS — K80.00 CALCULUS OF GALLBLADDER WITH ACUTE CHOLECYSTITIS WITHOUT OBSTRUCTION: Primary | ICD-10-CM

## 2022-12-27 PROCEDURE — 3288F PR FALLS RISK ASSESSMENT DOCUMENTED: ICD-10-PCS | Mod: CPTII,S$GLB,, | Performed by: SURGERY

## 2022-12-27 PROCEDURE — 1159F PR MEDICATION LIST DOCUMENTED IN MEDICAL RECORD: ICD-10-PCS | Mod: CPTII,S$GLB,, | Performed by: SURGERY

## 2022-12-27 PROCEDURE — 99214 PR OFFICE/OUTPT VISIT, EST, LEVL IV, 30-39 MIN: ICD-10-PCS | Mod: S$GLB,,, | Performed by: SURGERY

## 2022-12-27 PROCEDURE — 3078F DIAST BP <80 MM HG: CPT | Mod: CPTII,S$GLB,, | Performed by: SURGERY

## 2022-12-27 PROCEDURE — 3008F BODY MASS INDEX DOCD: CPT | Mod: CPTII,S$GLB,, | Performed by: SURGERY

## 2022-12-27 PROCEDURE — 1101F PR PT FALLS ASSESS DOC 0-1 FALLS W/OUT INJ PAST YR: ICD-10-PCS | Mod: CPTII,S$GLB,, | Performed by: SURGERY

## 2022-12-27 PROCEDURE — 1101F PT FALLS ASSESS-DOCD LE1/YR: CPT | Mod: CPTII,S$GLB,, | Performed by: SURGERY

## 2022-12-27 PROCEDURE — 3074F SYST BP LT 130 MM HG: CPT | Mod: CPTII,S$GLB,, | Performed by: SURGERY

## 2022-12-27 PROCEDURE — 1126F AMNT PAIN NOTED NONE PRSNT: CPT | Mod: CPTII,S$GLB,, | Performed by: SURGERY

## 2022-12-27 PROCEDURE — 3008F PR BODY MASS INDEX (BMI) DOCUMENTED: ICD-10-PCS | Mod: CPTII,S$GLB,, | Performed by: SURGERY

## 2022-12-27 PROCEDURE — 99214 OFFICE O/P EST MOD 30 MIN: CPT | Mod: S$GLB,,, | Performed by: SURGERY

## 2022-12-27 PROCEDURE — 1126F PR PAIN SEVERITY QUANTIFIED, NO PAIN PRESENT: ICD-10-PCS | Mod: CPTII,S$GLB,, | Performed by: SURGERY

## 2022-12-27 PROCEDURE — 3078F PR MOST RECENT DIASTOLIC BLOOD PRESSURE < 80 MM HG: ICD-10-PCS | Mod: CPTII,S$GLB,, | Performed by: SURGERY

## 2022-12-27 PROCEDURE — 3074F PR MOST RECENT SYSTOLIC BLOOD PRESSURE < 130 MM HG: ICD-10-PCS | Mod: CPTII,S$GLB,, | Performed by: SURGERY

## 2022-12-27 PROCEDURE — 1159F MED LIST DOCD IN RCRD: CPT | Mod: CPTII,S$GLB,, | Performed by: SURGERY

## 2022-12-27 PROCEDURE — 3288F FALL RISK ASSESSMENT DOCD: CPT | Mod: CPTII,S$GLB,, | Performed by: SURGERY

## 2022-12-27 RX ORDER — MUPIROCIN 20 MG/G
OINTMENT TOPICAL
Status: CANCELLED | OUTPATIENT
Start: 2022-12-27

## 2022-12-27 RX ORDER — SODIUM CHLORIDE 9 MG/ML
INJECTION, SOLUTION INTRAVENOUS CONTINUOUS
Status: CANCELLED | OUTPATIENT
Start: 2022-12-27

## 2022-12-27 NOTE — PROGRESS NOTES
Answers submitted by the patient for this visit:  Review of Systems Questionnaire (Submitted on 12/21/2022)  activity change: No  unexpected weight change: No  neck pain: No  hearing loss: No  rhinorrhea: No  trouble swallowing: No  eye discharge: No  visual disturbance: No  chest tightness: No  wheezing: No  chest pain: No  palpitations: No  blood in stool: No  constipation: No  vomiting: No  diarrhea: No  polydipsia: No  polyuria: No  difficulty urinating: No  hematuria: No  menstrual problem: No  dysuria: No  joint swelling: No  arthralgias: No  headaches: No  weakness: No  confusion: No  dysphoric mood: No

## 2022-12-27 NOTE — PROGRESS NOTES
History & Physical    SUBJECTIVE:     History of Present Illness:  Patient is a 72 y.o. female presents with recent hospitalization for cholecystitis with stones.     Chief Complaint   Patient presents with    cholecystitis     Hospital follow up w/ US abdomen        Review of patient's allergies indicates:   Allergen Reactions    Iodine and iodide containing products        Current Outpatient Medications   Medication Sig Dispense Refill    amLODIPine (NORVASC) 5 MG tablet Take 5 mg by mouth once daily.      famotidine (PEPCID) 20 MG tablet Take 1 tablet (20 mg total) by mouth 2 (two) times daily. 60 tablet 11    gabapentin (NEURONTIN) 300 MG capsule Take 300 mg by mouth 3 (three) times daily.      oxybutynin (DITROPAN) 5 MG Tab Take 5 mg by mouth 2 (two) times daily.      sertraline (ZOLOFT) 100 MG tablet Take 100 mg by mouth once daily.      triamcinolone acetonide 0.1% (KENALOG) 0.1 % ointment Apply topically 2 (two) times daily. 15 g 1    primidone (MYSOLINE) 50 MG Tab Take 2 tablets (100 mg total) by mouth 3 (three) times daily. 180 tablet 11     No current facility-administered medications for this visit.       Past Medical History:   Diagnosis Date    Dyslipidemia     HTN (hypertension)     Tremor      Past Surgical History:   Procedure Laterality Date    DEEP BRAIN STIMULATOR PLACEMENT Bilateral 1/4/2022    Procedure: INSERTION, DEEP BRAIN STIMULATOR BILATERAL VIM;  Surgeon: Neal Hernández MD;  Location: [address];  Service: Neurosurgery;  Laterality: Bilateral;  BILATERAL (LEFT FIRST, RIGHT SECOND)  INSERTION ELECTRODES FOR DEEP BRAIN STIMULATION IN VENTRALIS INTERMEDIUS/TEDS & SCD/ MEDTRONICS, ALYSSIA LANDRY. CO SURGERY WITH DR JT MITCHELL.    ESOPHAGOGASTRODUODENOSCOPY N/A 5/21/2021    Procedure: EGD (ESOPHAGOGASTRODUODENOSCOPY);  Surgeon: Juan R Watson MD;  Location: [facility] END[facility];  Service: Endoscopy;  Laterality: N/A;  covid test 5/18 [place], instr portal -ml    HAND SURGERY      HYSTERECTOMY       INSERTION OF DEEP BRAIN STIMULATOR GENERATOR Left 2022    Procedure: INSERTION, PULSE GENERATOR, DEEP BRAIN STIMULATOR;  Surgeon: Neal Hernández MD;  [facility];  Service: Neurosurgery;  Laterality: Left;  INSERT PULSE GENERATOR FOR DEEP BRAIN STIMULATION/ TEDS & SCD/ MEDTRONICS, ALYSSIA LANDRY.    PLACEMENT OF FIDUCIAL SCREW INTO SPINE N/A 2022    Procedure: INSERTION, FIDUCIAL SCREW, SKULL;  Surgeon: Neal Hernández MD;  Location: [facility] OR [facility]ND [facility];  Service: Neurosurgery;  Laterality: N/A;  INSERTION FIDUCIAL SCREWS FOR DEEP BRAIN STIMULATION/ TEDS & ACD/ MEDTRONICS, AYLSSIA LANDRY/ CO SURGERY WITH DR JT MITCHELL     Family History   Problem Relation Age of Onset    Alzheimer's disease Mother     Cancer Father      Social History     Tobacco Use    Smoking status: Former     Packs/day: 2.00     Years: 30.00     Pack years: 60.00     Types: Cigarettes     Start date: 1968     Quit date: 1999     Years since quittin.0    Smokeless tobacco: Never   Substance Use Topics    Alcohol use: Not Currently    Drug use: Never        Review of Systems:  Review of Systems   Constitutional: Negative.  Negative for activity change and unexpected weight change.   HENT: Negative.  Negative for hearing loss, rhinorrhea and trouble swallowing.    Eyes: Negative.  Negative for discharge and visual disturbance.   Respiratory: Negative.  Negative for chest tightness and wheezing.    Cardiovascular: Negative.  Negative for chest pain and palpitations.   Gastrointestinal: Negative.  Negative for blood in stool, constipation, diarrhea and vomiting.   Endocrine: Negative.  Negative for polydipsia and polyuria.   Genitourinary:  Negative for difficulty urinating, dysuria, hematuria and menstrual problem.   Musculoskeletal: Negative.  Negative for arthralgias, joint swelling and neck pain.   Skin: Negative.    Allergic/Immunologic: Negative.    Neurological: Negative.  Negative for weakness and headaches.  "  Hematological: Negative.    Psychiatric/Behavioral: Negative.  Negative for confusion and dysphoric mood.    All other systems reviewed and are negative.    OBJECTIVE:     Vital Signs (Most Recent)  Pulse: 79 (12/27/22 1013)  BP: 112/77 (12/27/22 1013)  SpO2: 97 % (12/27/22 1013)  5' 6" (1.676 m)  70.8 kg (155 lb 15.6 oz)     Physical Exam:  Physical Exam  Vitals reviewed.   Constitutional:       Appearance: She is well-developed.   HENT:      Head: Normocephalic and atraumatic.      Right Ear: External ear normal.      Left Ear: External ear normal.      Nose: Nose normal.   Eyes:      Conjunctiva/sclera: Conjunctivae normal.      Pupils: Pupils are equal, round, and reactive to light.   Cardiovascular:      Rate and Rhythm: Normal rate and regular rhythm.      Heart sounds: Normal heart sounds.   Pulmonary:      Effort: Pulmonary effort is normal.      Breath sounds: Normal breath sounds.   Abdominal:      General: Bowel sounds are normal.      Palpations: Abdomen is soft.   Musculoskeletal:         General: Normal range of motion.      Cervical back: Normal range of motion and neck supple.   Skin:     General: Skin is warm and dry.   Neurological:      Mental Status: She is alert and oriented to person, place, and time.      Deep Tendon Reflexes: Reflexes are normal and symmetric.   Psychiatric:         Behavior: Behavior normal.         Thought Content: Thought content normal.       Laboratory  none    Diagnostic Results:  US: Reviewed  gallstones    ASSESSMENT/PLAN:     Symptomatic cholelithiasis    PLAN:Plan     We discussed her diagnosis and the need for operative intervention.  I will schedule her for robotic eliot with the risks explained         "

## 2022-12-28 ENCOUNTER — PATIENT MESSAGE (OUTPATIENT)
Dept: SURGERY | Facility: HOSPITAL | Age: 72
End: 2022-12-28
Payer: MEDICARE

## 2022-12-29 NOTE — DISCHARGE SUMMARY
[facility] Medicine  Discharge Summary      Patient Name: Elsa Steel  MRN: [id number]  Patient Class: OP- Observation  Admission Date: 12/11/2022  Hospital Length of Stay: 0 days  Discharge Date and Time: 12/14/2022  3:30 PM  Attending Physician: No att. providers found   Discharging Provider: Jam Brooke MD  Primary Care Provider: David Olson MD      HPI:   Elsa Steel 72 y.o. female with essential tremor, HTN, presents to the hospital with a chief complaint of abdominal pain.  She reports sudden onset epigastric abdominal pain with radiation to her chest began this moaning with associated nausea and vomiting.  She has not attempted any treatment home.  She denies any aggravating alleviating factors.  She describes as constant stabbing pain.  She reports this never occurred before.  She denies fever shortness of breath leg swelling syncope dizziness dysuria melena hematuria hematemesis.  Her last bowel movement was this morning.  She does not take any blood thinners.    In the ED, afebrile without leukocytosis lactic acid negative BNP negative troponin negative creatinine 0.7 the scan Q scan low probability of pulmonary embolism CT head without acute intracranial abnormalities stable positioning of deep brain stimulator CT chest abdomen pelvis without contrast suggestive of acute cholecystitis including cholelithiasis bilateral dependent lower traction bronchiectasis in airspace disease.      * No surgery found *      Hospital Course:   72 year old female who presented with abdominal pain nausea and vomiting. CT abdomen with findings suggestive of acute cholecystitis and cholelithiasis, also with bilateral lower love bronchiectasis and airspace disease. Discussed with general surgery by ED recommending US abdomen. US abd with cholelithiasis and sludge, but no definitve findings of cholecystitis. Surgery then recommended HIDA scan which was done and negative. Her abdominal pain  improved and she was tolerating a diet and general surgery recommended outpatient follow up for cholecystectomy. CT also showed pulmonary nodules and patient required intermittent oxygen while inpatient. She will need follow up with pulmonology as well. She was discharged home in stable condition.        Goals of Care Treatment Preferences:  Code Status: Full Code      Consults:   Consults (From admission, onward)        Status Ordering Provider     Inpatient virtual consult to Hospital Medicine  Once        Provider:  (Not yet assigned)    Completed KARLEE BARRON III     Case Management/  Once        Provider:  (Not yet assigned)    Completed SKIP VILLAFUERTE     Inpatient consult to General surgery  Once        Provider:  Severino Daniels MD    Completed KITTY ZUNIGA          No new Assessment & Plan notes have been filed under this hospital service since the last note was generated.  Service: Hospital Medicine    Final Active Diagnoses:    Diagnosis Date Noted POA    PRINCIPAL PROBLEM:  Abdominal pain [R10.9] 12/11/2022 Yes    Calculus of gallbladder with acute cholecystitis without obstruction [K80.00] 12/12/2022 Yes    Pulmonary nodules [R91.8] 12/11/2022 Yes    HTN (hypertension) [I10] 12/11/2022 Yes    Depression [F32.A] 09/22/2021 Yes    Essential tremor [G25.0] 06/21/2021 Yes      Problems Resolved During this Admission:       Discharged Condition: stable    Disposition: Home or Self Care    Follow Up:   Follow-up Information     Duglas Anand MD Follow up on 12/27/2022.    Specialties: General Surgery, Oncology  Why: at 10:15am  Contact information:  [address]  [address]  [facility] [address]  [phone]             Manuel Sarkar MD Follow up on 1/31/2023.    Specialties: Pulmonary Disease, Sleep Medicine  Why: at 8am-- you have been added to the waitlist should a sooner appointment become available  Contact information:  [address]  [address]  [place]  "[place] [address]  [phone]             Curly Sharma MD Follow up on 1/11/2023.    Specialty: Cardiology  Why: @ 2:45pm  Contact information:  [address] [facility]  [address]  [place] [address]  [phone]                       Patient Instructions:      OXYGEN FOR HOME USE     Order Specific Question Answer Comments   Liter Flow 1    Duration With activity    Qualifying Test Performed at: Activity    Oxygen saturation at rest 95    Oxygen saturation with activity 88    Oxygen saturation with activity on oxygen 96    Portable mode: pulse dose acceptable    Mode: Portable concentrator    Route nasal cannula    Device: home concentrator with portable concentrator    Length of need (in months): 99 mos    Patient condition with qualifying saturation CHF    Height: 5' 6" (1.676 m)    Weight: 70.5 kg (155 lb 6.8 oz)    Alternative treatment measures have been tried or considered and deemed clinically ineffective. Yes      Ambulatory referral/consult to General Surgery   Standing Status: Future   Referral Priority: Routine Referral Type: Consultation   Referral Reason: Specialty Services Required   Requested Specialty: General Surgery   Number of Visits Requested: 1     Ambulatory referral/consult to Pulmonology   Standing Status: Future   Referral Priority: Routine Referral Type: Consultation   Referral Reason: Specialty Services Required   Requested Specialty: Pulmonary Disease   Number of Visits Requested: 1     Ambulatory referral/consult to General Surgery   Standing Status: Future   Referral Priority: Routine Referral Type: Consultation   Referral Reason: Specialty Services Required   Requested Specialty: General Surgery   Number of Visits Requested: 1     Ambulatory referral/consult to Cardiology   Standing Status: Future   Referral Priority: Routine Referral Type: Consultation   Referral Reason: Specialty Services Required   Requested Specialty: Cardiology   Number of Visits Requested: 1       Significant Diagnostic " Studies: Labs: All labs within the past 24 hours have been reviewed    Pending Diagnostic Studies:     None         Medications:  Reconciled Home Medications:      Medication List      START taking these medications    famotidine 20 MG tablet  Commonly known as: PEPCID  Take 1 tablet (20 mg total) by mouth 2 (two) times daily.        CONTINUE taking these medications    amLODIPine 5 MG tablet  Commonly known as: NORVASC  Take 5 mg by mouth once daily.     gabapentin 300 MG capsule  Commonly known as: NEURONTIN  Take 300 mg by mouth 3 (three) times daily.     oxybutynin 5 MG Tab  Commonly known as: DITROPAN  Take 5 mg by mouth 2 (two) times daily.     primidone 50 MG Tab  Commonly known as: MYSOLINE  Take 2 tablets (100 mg total) by mouth 3 (three) times daily.     sertraline 100 MG tablet  Commonly known as: ZOLOFT  Take 100 mg by mouth once daily.     triamcinolone acetonide 0.1% 0.1 % ointment  Commonly known as: KENALOG  Apply topically 2 (two) times daily.            Indwelling Lines/Drains at time of discharge:   Lines/Drains/Airways     None                 Time spent on the discharge of patient: > 35 minutes         The attending portion of this evaluation, treatment, and documentation was performed per Jam Brooke MD via Telemedicine AudioVisual using the secure AMS-Qi software platform with 2 way audio/video. The provider was located off-site and the patient is located in the hospital. The aforementioned video software was utilized to document the relevant history and physical exam    Jam Brooke MD  Department of [facility] Medicine  [facility]

## 2022-12-29 NOTE — HOSPITAL COURSE
72 year old female who presented with abdominal pain nausea and vomiting. CT abdomen with findings suggestive of acute cholecystitis and cholelithiasis, also with bilateral lower love bronchiectasis and airspace disease. Discussed with general surgery by ED recommending US abdomen. US abd with cholelithiasis and sludge, but no definitve findings of cholecystitis. Surgery then recommended HIDA scan which was done and negative. Her abdominal pain improved and she was tolerating a diet and general surgery recommended outpatient follow up for cholecystectomy. CT also showed pulmonary nodules and patient required intermittent oxygen while inpatient. She will need follow up with pulmonology as well. She was discharged home in stable condition.

## 2023-01-01 ENCOUNTER — HOSPITAL ENCOUNTER (EMERGENCY)
Facility: HOSPITAL | Age: 73
Discharge: HOME | End: 2023-01-01
Attending: EMERGENCY MEDICINE
Payer: MEDICARE

## 2023-01-01 VITALS
SYSTOLIC BLOOD PRESSURE: 135 MMHG | TEMPERATURE: 99 F | HEART RATE: 85 BPM | OXYGEN SATURATION: 96 % | DIASTOLIC BLOOD PRESSURE: 68 MMHG | WEIGHT: 128 LBS | RESPIRATION RATE: 18 BRPM | HEIGHT: 66 IN | BODY MASS INDEX: 20.57 KG/M2

## 2023-01-01 DIAGNOSIS — U07.1 COVID-19: Primary | ICD-10-CM

## 2023-01-01 LAB
CTP QC/QA: YES
SARS-COV-2 RDRP RESP QL NAA+PROBE: POSITIVE

## 2023-01-01 PROCEDURE — 99283 EMERGENCY DEPT VISIT LOW MDM: CPT | Mod: 25

## 2023-01-01 PROCEDURE — 87635 SARS-COV-2 COVID-19 AMP PRB: CPT | Performed by: EMERGENCY MEDICINE

## 2023-01-01 RX ORDER — GUAIFENESIN/DEXTROMETHORPHAN 100-10MG/5
5 SYRUP ORAL 4 TIMES DAILY PRN
Qty: 120 ML | Refills: 0 | Status: SHIPPED | OUTPATIENT
Start: 2023-01-01 | End: 2023-01-11

## 2023-01-01 NOTE — ED PROVIDER NOTES
Encounter Date: 1/1/2023       History     Chief Complaint   Patient presents with    COVID-19 Concerns     Tested positive for covid today at the urgent care. Patient reports productive cough, chills, body aches, fever, and SOB x1 week.      72-year-old female with history of hypertension, tremor presents to the emergency department complaining of COVID symptoms.  Symptoms started on December 27th with cough, congestion, postnasal drip, fatigue, intermittent fever.  Patient reports fever has improved.  She states that she has not had much of an appetite due to loss of sense of smell and taste, decreased p.o. food intake but has been drinking fluids.  She went to an urgent care today where she tested positive for COVID.  She was referred to the ED to evaluate for Paxlovid versus antibody infusion candidacy.    The history is provided by the patient.   Review of patient's allergies indicates:   Allergen Reactions    Iodine and iodide containing products      Past Medical History:   Diagnosis Date    Dyslipidemia     HTN (hypertension)     Tremor      Past Surgical History:   Procedure Laterality Date    DEEP BRAIN STIMULATOR PLACEMENT Bilateral 1/4/2022    Procedure: INSERTION, DEEP BRAIN STIMULATOR BILATERAL VIM;  Surgeon: Neal Hernández MD;  Location: [address];  Service: Neurosurgery;  Laterality: Bilateral;  BILATERAL (LEFT FIRST, RIGHT SECOND)  INSERTION ELECTRODES FOR DEEP BRAIN STIMULATION IN VENTRALIS INTERMEDIUS/TEDS & SCD/ MEDYAMILETHS, ALYSSIA LANDRY. CO SURGERY WITH DR JT MITCHELL.    ESOPHAGOGASTRODUODENOSCOPY N/A 5/21/2021    Procedure: EGD (ESOPHAGOGASTRODUODENOSCOPY);  Surgeon: Juan R Watson MD;  Location: [facility];  Service: Endoscopy;  Laterality: N/A;  covid test 5/18 [place], instr portal -ml    HAND SURGERY      HYSTERECTOMY      INSERTION OF DEEP BRAIN STIMULATOR GENERATOR Left 1/11/2022    Procedure: INSERTION, PULSE GENERATOR, DEEP BRAIN STIMULATOR;  Surgeon: Neal Hernández MD;   [facility];  Service: Neurosurgery;  Laterality: Left;  INSERT PULSE GENERATOR FOR DEEP BRAIN STIMULATION/ TEDS & SCD/ MEDTRONICS, ALYSSIA LANDRY.    PLACEMENT OF FIDUCIAL SCREW INTO SPINE N/A 2022    Procedure: INSERTION, FIDUCIAL SCREW, SKULL;  Surgeon: Neal Hernández MD;  Location: [facility] OR 2ND FLR;  Service: Neurosurgery;  Laterality: N/A;  INSERTION FIDUCIAL SCREWS FOR DEEP BRAIN STIMULATION/ TEDS & ACD/ MEDTRONICS, ALYSSIA LANDRY/ CO SURGERY WITH DR JT MITCHELL     Family History   Problem Relation Age of Onset    Alzheimer's disease Mother     Cancer Father      Social History     Tobacco Use    Smoking status: Former     Packs/day: 2.00     Years: 30.00     Pack years: 60.00     Types: Cigarettes     Start date: 1968     Quit date: 1999     Years since quittin.0    Smokeless tobacco: Never   Substance Use Topics    Alcohol use: Not Currently    Drug use: Never     Review of Systems   Constitutional:  Positive for appetite change, chills, fatigue and fever (Improved).   HENT:  Positive for congestion, postnasal drip and sore throat.    Respiratory:  Positive for cough and shortness of breath.    Cardiovascular:  Negative for chest pain.   Gastrointestinal:  Positive for diarrhea. Negative for abdominal pain and vomiting.   Genitourinary:  Negative for difficulty urinating.   Allergic/Immunologic: Negative for immunocompromised state.   Neurological:  Positive for tremors and headaches. Negative for speech difficulty.     Physical Exam     Initial Vitals [23 1521]   BP Pulse Resp Temp SpO2   (!) 144/67 79 18 98.8 °F (37.1 °C) 95 %      MAP       --         Physical Exam    Constitutional: She appears well-developed and well-nourished. She is not diaphoretic. No distress.   HENT:   Mouth/Throat: Uvula is midline, oropharynx is clear and moist and mucous membranes are normal. No oropharyngeal exudate, posterior oropharyngeal erythema or tonsillar abscesses.   Cardiovascular:  Normal  rate and regular rhythm.           Pulmonary/Chest: Breath sounds normal. No respiratory distress. She has no wheezes. She has no rhonchi. She has no rales.   Abdominal: Abdomen is soft. Bowel sounds are normal. She exhibits no distension. There is no abdominal tenderness.     Neurological: She is alert and oriented to person, place, and time. GCS score is 15. GCS eye subscore is 4. GCS verbal subscore is 5. GCS motor subscore is 6.   Skin: Skin is warm and dry.   Psychiatric: She has a normal mood and affect.       ED Course   Procedures  Labs Reviewed   SARS-COV-2 RDRP GENE - Abnormal; Notable for the following components:       Result Value    POC Rapid COVID Positive (*)     All other components within normal limits          Imaging Results              X-Ray Chest AP Portable (Final result)  Result time 01/01/23 17:31:43      Final result by Chloe Morales MD (01/01/23 17:31:43)                   Impression:      No acute cardiopulmonary process identified.      Electronically signed by: Chloe Morales MD  Date:    01/01/2023  Time:    17:31               Narrative:    EXAMINATION:  XR CHEST AP PORTABLE    CLINICAL HISTORY:  COVID-19;    TECHNIQUE:  Single frontal view of the chest was performed.    COMPARISON:  12/11/2022.    FINDINGS:  Cardiac silhouette is normal in size.  Left-sided stimulator device is seen.  Lungs are symmetrically expanded.  No evidence of new focal consolidative process, pneumothorax, or significant pleural effusion.  No acute osseous abnormality identified.  Breast implants are noted.                                       Medications - No data to display  Medical Decision Making:   Initial Assessment:   72-year-old female presents to the emergency department from an urgent care after testing positive for COVID.  Symptoms started December 27th, URI symptoms, some diarrhea, postnasal drip.  She has decreased p.o. intake but has been drinking fluids.  On exam, the patient is very  well-appearing and in no distress.  Breath sounds are clear to auscultation bilaterally.  Discussed that there is no current recommendation for use of monoclonal antibody treatment in COVID-19 patient's, symptom onset less than 5 days so I have offered prescription for Paxlovid.  Encouraged continued p.o. hydration, rest, will provide written instructions for COVID-19, will provide work excuse, Rx for cough medication.                        Clinical Impression:   Final diagnoses:  [U07.1] COVID-19 (Primary)       This dictation has been generated using M-Modal Fluency Direct dictation; some phonetic errors may occur.       ED Prescriptions       Medication Sig Dispense Start Date End Date Auth. Provider    nirmatrelvir-ritonavir 300 mg (150 mg x 2)-100 mg copackaged tablets (EUA) Take 3 tablets by mouth 2 (two) times daily for 5 days. Each dose contains 2 nirmatrelvir (pink tablets) and 1 ritonavir (white tablet). Take all 3 tablets together 30 tablet 1/1/2023 1/6/2023 Selene Hayes MD    dextromethorphan-guaiFENesin  mg/5 ml (ROBITUSSIN-DM)  mg/5 mL liquid Take 5 mLs by mouth 4 (four) times daily as needed (cough). 120 mL 1/1/2023 1/11/2023 Selene Hayes MD          Follow-up Information       Follow up With Specialties Details Why Contact Info    David Olson MD Internal Medicine Schedule an appointment as soon as possible for a visit on 1/9/2023 To recheck your symptoms [address]  [address]  [facility] [address]  [phone]      [facility] Emergency Dept Emergency Medicine  As needed, If symptoms worsen [address]  [facility] [address] [phone]             Selene Hayes MD  01/01/23 1800

## 2023-01-01 NOTE — ED TRIAGE NOTES
Pt arrived to ED with c/o covid concerns. Pt reports cough, chills, body aches, fever, and sob x 1 week. Reports going to urgent care today and tested positive for covid. Reports sent from urgent care for further evaluation. Pt reports feeling like she is starting to get better. Nad.

## 2023-01-01 NOTE — PLAN OF CARE
LAINE SCORES 2-3.   [facility] - Neurosurgery (Hospital)  Discharge Final Note    Primary Care Provider: David Olson MD    Expected Discharge Date: 1/5/2022     Patient to be discharged home.  The patient rec was home health but patient refused, stating not necessary.  Family to provide transportation home.  Neurosurgery clinic to schedule follow up appointment.    Future Appointments   Date Time Provider Department Center   1/20/2022  9:00 AM Dayana Foss MD [facility] NEURO8 [facility]   1/20/2022 12:30 PM Neal Hernández MD [facility] NEUROS [facility]       Final Discharge Note (most recent)     Final Note - 01/05/22 1633        Final Note    Assessment Type Final Discharge Note     Anticipated Discharge Disposition Home or Self Care        Post-Acute Status    Post-Acute Authorization Other     Other Status No Post-Acute Service Needs     Discharge Delays None known at this time                 Important Message from Medicare

## 2023-01-01 NOTE — Clinical Note
"Elsa"Bobbi Steel was seen and treated in our emergency department on 1/1/2023.     COVID-19 is present in our communities across the state. There is limited testing for COVID at this time, so not all patients can be tested. In this situation, your employee meets the following criteria:    Elsa Steel has met the criteria for COVID-19 testing and has a POSITIVE result. She can return to work once they are asymptomatic for 24 hours without the use of fever reducing medications AND at least five days from the first positive result. A mask is recommended for 5 days post quarantine.     If you have any questions or concerns, or if I can be of further assistance, please do not hesitate to contact me.    Sincerely,             Selene Hayes MD"

## 2023-01-01 NOTE — DISCHARGE INSTRUCTIONS
Thank you for coming to our Emergency Department today. It is important to remember that some problems are difficult to diagnose and may not be found during your Emergency Department visit. Be sure to follow up with your primary care doctor and review all labs/imaging/tests that were performed during this visit with them. Some labs/tests may be outside of the normal range and require non-emergent follow-up and further investigation to help diagnose/exclude/prevent complications or other medical conditions.    If you do not have a primary care doctor, you may contact the one listed on your discharge paperwork or you may also call the [facility] Appointment Desk at [phone] to schedule an appointment and establish care with one. It is important to your health that you have a primary care doctor.    Medicaid Escalation Line:   [phone] - Please contact this number if you are having difficulty getting follow up with a Primary Care Provider or Speciality Provider.     Please take all medications as directed. All medications may potentially have side-effects and it is impossible to predict which medications may give you side-effects or what side-effects (if any) they will give you.. If you feel that you are having a negative effect or side-effect of any medication you should immediately stop taking them and seek medical attention. If you feel that you are having a life-threatening reaction call [phone].    Return to the ER with any questions/concerns, new/concerning symptoms, worsening or failure to improve.     Do not drive, swim, climb to height, take a bath or make any important decisions for 24 hours if you have received any pain medications, sedatives or mood altering drugs during your ER visit.        BELOW THIS LINE ONLY APPLIES IF YOU HAVE A COVID TEST PENDING OR IF YOU HAVE BEEN DIAGNOSED WITH COVID:  Please access [facility] to review the results of your test. Until the results of your COVID test  return, you should isolate yourself so as not to potentially spread illness to others.   If your COVID test returns positive, you should isolate yourself so as not to spread illness to others. After five full days, if you are feeling better and you have not had fever for 24 hours, you can return to your typical daily activities, but you must wear a mask around others for an additional 5 days.   If your COVID test returns negative and you are either unvaccinated or more than six months out from your two-dose vaccine and are not yet boosted, you should still quarantine for 5 full days followed by strict mask use for an additional 5 full days.   If your COVID test returns negative and you have received your 2-dose initial vaccine as well as a booster, you should continue strict mask use for 10 full days after the exposure.  For all those exposed, best practice includes a test at day 5 after the exposure. This can be a home test or a test through one of the many testing centers throughout our community.   Masking is always advised to limit the spread of COVID. Cdc.gov is an excellent site to obtain the latest up to date recommendations regarding COVID and COVID testing.     CDC Testing and Quarantine Guidelines for patients with exposure to a known-positive COVID-19 person:  A close exposure is defined as anyone who has had an exposure (masked or unmasked) to a known COVID -19 positive person within 6 feet of someone for a cumulative total of 15 minutes or more over a 24-hour period.   Vaccinated and/or if you recently had a positive covid test within 90 days do NOT need to quarantine after contact with someone who had COVID-19 unless you develop symptoms.   Fully vaccinated people who have not had a positive test within 90 days, should get tested 3-5 days after their exposure, even if they don't have symptoms and wear a mask indoors in public for 14 days following exposure or until their test result is  negative.      Unvaccinated and/or NOT had a positive test within 90 days and meet close exposure  You are required by CDC guidelines to quarantine for at least 5 days from time of exposure followed by 5 days of strict masking. It is recommended, but not required to test after 5 days, unless you develop symptoms, in which case you should test at that time.  If you get tested after 5 days and your test is positive, your 5 day period of isolation starts the day of the positive test.    If your exposure does not meet the above definition, you can return to your normal daily activities to include social distancing, wearing a mask and frequent handwashing.      Here is a link to guidance from the CDC:  https://www.cdc.gov/media/releases/2021/s1227-isolation-quarantine-guidance.html      [facility]  Health Testing Sites:  https://ldh.[place].gov/page/3934      [facility] website with testing locations and guidance:  https://[email or web address]

## 2023-01-03 ENCOUNTER — TELEPHONE (OUTPATIENT)
Dept: SURGERY | Facility: CLINIC | Age: 73
End: 2023-01-03
Payer: MEDICARE

## 2023-01-03 NOTE — TELEPHONE ENCOUNTER
Spoke with  regarding msg and informed her I would relay the msg to the surgery department. She verbalized understanding.         ----- Message from Aung Lino sent at 1/3/2023 11:56 AM CST -----  Name of Who is Calling: VERONICA PACE [[id number]]           What is the request in detail: Patient is requesting a call back to reschedule upcoming procedure and pre-admit.  Patient has been diagnosed with COVID. Please assist.           Can the clinic reply by [facility]: No           What Number to Call Back if not in [facility]: [phone]

## 2023-01-04 ENCOUNTER — NURSE TRIAGE (OUTPATIENT)
Dept: ADMINISTRATIVE | Facility: CLINIC | Age: 73
End: 2023-01-04
Payer: MEDICARE

## 2023-01-04 NOTE — TELEPHONE ENCOUNTER
Pt responded to [facility] text message. Pt reports that she is feeling weak, and tired at this time. Reports that she just started with Paxlovid yesterday. Reports she has cough congestion, and reports SOB , but states this is normal, and not worse than usual. Pt advised to be seen in ED now. Pt states that she will consider going.   Reason for Disposition   Difficulty breathing    Additional Information   Negative: SEVERE difficulty breathing (e.g., struggling for each breath, speaks in single words)   Negative: Shock suspected (e.g., cold/pale/clammy skin, too weak to stand, low BP, rapid pulse)   Negative: Difficult to awaken or acting confused (e.g., disoriented, slurred speech)   Negative: Fainted > 15 minutes ago and still feels too weak or dizzy to stand   Negative: SEVERE weakness (i.e., unable to walk or barely able to walk, requires support) and new-onset or worsening   Negative: Sounds like a life-threatening emergency to the triager    Protocols used: Weakness (Generalized) and Fatigue-A-OH

## 2023-01-20 ENCOUNTER — TELEPHONE (OUTPATIENT)
Dept: SURGERY | Facility: CLINIC | Age: 73
End: 2023-01-20
Payer: MEDICARE

## 2023-01-20 ENCOUNTER — PATIENT MESSAGE (OUTPATIENT)
Dept: NEUROLOGY | Facility: CLINIC | Age: 73
End: 2023-01-20
Payer: MEDICARE

## 2023-01-20 ENCOUNTER — PATIENT MESSAGE (OUTPATIENT)
Dept: SURGERY | Facility: CLINIC | Age: 73
End: 2023-01-20
Payer: MEDICARE

## 2023-01-20 NOTE — TELEPHONE ENCOUNTER
Spoke with  regarding rescheduling surgery with . pt decided 2/1, informed her I will relay the msg to the surgery department. Pt verbalized understanding.

## 2023-01-27 ENCOUNTER — OFFICE VISIT (OUTPATIENT)
Dept: CARDIOLOGY | Facility: CLINIC | Age: 73
End: 2023-01-27
Payer: MEDICARE

## 2023-01-27 ENCOUNTER — TELEPHONE (OUTPATIENT)
Dept: SURGERY | Facility: CLINIC | Age: 73
End: 2023-01-27
Payer: MEDICARE

## 2023-01-27 VITALS
BODY MASS INDEX: 23.66 KG/M2 | HEIGHT: 66 IN | RESPIRATION RATE: 15 BRPM | HEART RATE: 86 BPM | WEIGHT: 147.25 LBS | OXYGEN SATURATION: 97 % | DIASTOLIC BLOOD PRESSURE: 88 MMHG | SYSTOLIC BLOOD PRESSURE: 146 MMHG

## 2023-01-27 DIAGNOSIS — I10 HYPERTENSION, UNSPECIFIED TYPE: ICD-10-CM

## 2023-01-27 DIAGNOSIS — I50.32 CHRONIC DIASTOLIC HEART FAILURE: ICD-10-CM

## 2023-01-27 DIAGNOSIS — R07.9 CHEST PAIN, UNSPECIFIED TYPE: Primary | ICD-10-CM

## 2023-01-27 DIAGNOSIS — R07.89 CHEST PAIN, ATYPICAL: ICD-10-CM

## 2023-01-27 DIAGNOSIS — R06.09 DYSPNEA ON EXERTION: ICD-10-CM

## 2023-01-27 PROCEDURE — 3079F PR MOST RECENT DIASTOLIC BLOOD PRESSURE 80-89 MM HG: ICD-10-PCS | Mod: CPTII,S$GLB,, | Performed by: INTERNAL MEDICINE

## 2023-01-27 PROCEDURE — 3288F FALL RISK ASSESSMENT DOCD: CPT | Mod: CPTII,S$GLB,, | Performed by: INTERNAL MEDICINE

## 2023-01-27 PROCEDURE — 99214 OFFICE O/P EST MOD 30 MIN: CPT | Mod: S$GLB,,, | Performed by: INTERNAL MEDICINE

## 2023-01-27 PROCEDURE — 1126F PR PAIN SEVERITY QUANTIFIED, NO PAIN PRESENT: ICD-10-PCS | Mod: CPTII,S$GLB,, | Performed by: INTERNAL MEDICINE

## 2023-01-27 PROCEDURE — 99214 PR OFFICE/OUTPT VISIT, EST, LEVL IV, 30-39 MIN: ICD-10-PCS | Mod: S$GLB,,, | Performed by: INTERNAL MEDICINE

## 2023-01-27 PROCEDURE — 3008F BODY MASS INDEX DOCD: CPT | Mod: CPTII,S$GLB,, | Performed by: INTERNAL MEDICINE

## 2023-01-27 PROCEDURE — 3077F SYST BP >= 140 MM HG: CPT | Mod: CPTII,S$GLB,, | Performed by: INTERNAL MEDICINE

## 2023-01-27 PROCEDURE — 1159F PR MEDICATION LIST DOCUMENTED IN MEDICAL RECORD: ICD-10-PCS | Mod: CPTII,S$GLB,, | Performed by: INTERNAL MEDICINE

## 2023-01-27 PROCEDURE — 1160F PR REVIEW ALL MEDS BY PRESCRIBER/CLIN PHARMACIST DOCUMENTED: ICD-10-PCS | Mod: CPTII,S$GLB,, | Performed by: INTERNAL MEDICINE

## 2023-01-27 PROCEDURE — 1160F RVW MEDS BY RX/DR IN RCRD: CPT | Mod: CPTII,S$GLB,, | Performed by: INTERNAL MEDICINE

## 2023-01-27 PROCEDURE — 3288F PR FALLS RISK ASSESSMENT DOCUMENTED: ICD-10-PCS | Mod: CPTII,S$GLB,, | Performed by: INTERNAL MEDICINE

## 2023-01-27 PROCEDURE — 1101F PT FALLS ASSESS-DOCD LE1/YR: CPT | Mod: CPTII,S$GLB,, | Performed by: INTERNAL MEDICINE

## 2023-01-27 PROCEDURE — 99999 PR PBB SHADOW E&M-EST. PATIENT-LVL IV: ICD-10-PCS | Mod: PBBFAC,,, | Performed by: INTERNAL MEDICINE

## 2023-01-27 PROCEDURE — 3008F PR BODY MASS INDEX (BMI) DOCUMENTED: ICD-10-PCS | Mod: CPTII,S$GLB,, | Performed by: INTERNAL MEDICINE

## 2023-01-27 PROCEDURE — 1101F PR PT FALLS ASSESS DOC 0-1 FALLS W/OUT INJ PAST YR: ICD-10-PCS | Mod: CPTII,S$GLB,, | Performed by: INTERNAL MEDICINE

## 2023-01-27 PROCEDURE — 1159F MED LIST DOCD IN RCRD: CPT | Mod: CPTII,S$GLB,, | Performed by: INTERNAL MEDICINE

## 2023-01-27 PROCEDURE — 1126F AMNT PAIN NOTED NONE PRSNT: CPT | Mod: CPTII,S$GLB,, | Performed by: INTERNAL MEDICINE

## 2023-01-27 PROCEDURE — 3077F PR MOST RECENT SYSTOLIC BLOOD PRESSURE >= 140 MM HG: ICD-10-PCS | Mod: CPTII,S$GLB,, | Performed by: INTERNAL MEDICINE

## 2023-01-27 PROCEDURE — 99999 PR PBB SHADOW E&M-EST. PATIENT-LVL IV: CPT | Mod: PBBFAC,,, | Performed by: INTERNAL MEDICINE

## 2023-01-27 PROCEDURE — 3079F DIAST BP 80-89 MM HG: CPT | Mod: CPTII,S$GLB,, | Performed by: INTERNAL MEDICINE

## 2023-01-27 NOTE — PROGRESS NOTES
CARDIOVASCULAR CONSULTATION    REASON FOR CONSULT:   Elsa Steel is a 72 y.o. female who presents for evaluation    HISTORY OF PRESENT ILLNESS:     Patient is a pleasant 72-year-old lady.  Saw Dr. Sharma in 2021, now wants to shift to my clinic.  States occasionally gets chest tightness.  No particular aggravating or relieving factors.  Had a stress test done 2 years ago where she did not reach maximum age predicted heart rate but overall the stress echo was negative for ischemia.  Wants to go for cholecystectomy surgery next week.  Denies orthopnea, PND.  Does have dyspnea on exertion which is chronic.    2021:  The left ventricle is normal in size with normal systolic function.  The estimated ejection fraction is 55%.  Normal left ventricular diastolic function.  Normal right ventricular size with normal right ventricular systolic function.  Mild mitral regurgitation.  Intermediate central venous pressure (8 mmHg).  The estimated PA systolic pressure is 38 mmHg.  Trivial posterior pericardial effusion.  There were no arrhythmias during stress.  The ECG portion of this study is negative for myocardial ischemia.  The stress echo portion of this study is negative for myocardial ischemia.  Only 79% of maximum predicted heart rate achieved. Consider repeating as a pharmacological nuclear stress test       2022:        The left ventricle is normal in size with mild concentric hypertrophy and normal systolic function.  The estimated ejection fraction is 65%.  Grade I left ventricular diastolic dysfunction.  Normal right ventricular size with normal right ventricular systolic function.  Mild left atrial enlargement.  Normal central venous pressure (3 mmHg).        PAST MEDICAL HISTORY:     Past Medical History:   Diagnosis Date    Dyslipidemia     HTN (hypertension)     Tremor        PAST SURGICAL HISTORY:     Past Surgical History:   Procedure Laterality Date    DEEP BRAIN STIMULATOR PLACEMENT Bilateral 1/4/2022     Procedure: INSERTION, DEEP BRAIN STIMULATOR BILATERAL VIM;  Surgeon: Neal Hernández MD;  Location: [facility] OR [facility] FLR;  Service: Neurosurgery;  Laterality: Bilateral;  BILATERAL (LEFT FIRST, RIGHT SECOND)  INSERTION ELECTRODES FOR DEEP BRAIN STIMULATION IN VENTRALIS INTERMEDIUS/TEDS & SCD/ [employer]TRONICS, ALYSSIA LANDRY. CO SURGERY WITH DR JT MITCHELL.    ESOPHAGOGASTRODUODENOSCOPY N/A 5/21/2021    Procedure: EGD (ESOPHAGOGASTRODUODENOSCOPY);  Surgeon: Juan R Watson MD;  Location: [facility];  Service: Endoscopy;  Laterality: N/A;  covid test 5/18 [place], instr portal -ml    HAND SURGERY      HYSTERECTOMY      INSERTION OF DEEP BRAIN STIMULATOR GENERATOR Left 1/11/2022    Procedure: INSERTION, PULSE GENERATOR, DEEP BRAIN STIMULATOR;  Surgeon: Neal Hernández MD;  [facility];  Service: Neurosurgery;  Laterality: Left;  INSERT PULSE GENERATOR FOR DEEP BRAIN STIMULATION/ TEDS & SCD/ MEDTRONICS, ALYSSIA LANDRY.    PLACEMENT OF FIDUCIAL SCREW INTO SPINE N/A 1/4/2022    Procedure: INSERTION, FIDUCIAL SCREW, SKULL;  Surgeon: Neal Hernández MD;  [facility];  Service: Neurosurgery;  Laterality: N/A;  INSERTION FIDUCIAL SCREWS FOR DEEP BRAIN STIMULATION/ TEDS & ACD/ MEDTRONICS, ALYSSIA LANDRY/ CO SURGERY WITH DR JT MITCHELL       ALLERGIES AND MEDICATION:     Review of patient's allergies indicates:   Allergen Reactions    Iodine and iodide containing products         Medication List            Accurate as of January 27, 2023  1:11 PM. If you have any questions, ask your nurse or doctor.                CONTINUE taking these medications      amLODIPine 5 MG tablet  Commonly known as: NORVASC     famotidine 20 MG tablet  Commonly known as: PEPCID  Take 1 tablet (20 mg total) by mouth 2 (two) times daily.     gabapentin 300 MG capsule  Commonly known as: NEURONTIN     oxybutynin 5 MG Tab  Commonly known as: DITROPAN     primidone 50 MG Tab  Commonly known as: MYSOLINE  Take 2 tablets (100 mg total) by mouth 3  "(three) times daily.     sertraline 100 MG tablet  Commonly known as: ZOLOFT     triamcinolone acetonide 0.1% 0.1 % ointment  Commonly known as: KENALOG  Apply topically 2 (two) times daily.              SOCIAL HISTORY:     Social History     Socioeconomic History    Marital status: Single   Tobacco Use    Smoking status: Former     Packs/day: 2.00     Years: 30.00     Pack years: 60.00     Types: Cigarettes     Start date: 1968     Quit date: 1999     Years since quittin.0    Smokeless tobacco: Never   Substance and Sexual Activity    Alcohol use: Not Currently    Drug use: Never       FAMILY HISTORY:     Family History   Problem Relation Age of Onset    Alzheimer's disease Mother     Cancer Father        REVIEW OF SYSTEMS:   Review of Systems   Constitutional: Negative.   HENT: Negative.     Eyes: Negative.    Cardiovascular:  Positive for chest pain.   Respiratory: Negative.     Endocrine: Negative.    Hematologic/Lymphatic: Negative.    Skin: Negative.    Musculoskeletal: Negative.    Gastrointestinal: Negative.    Genitourinary: Negative.    Neurological: Negative.    Psychiatric/Behavioral: Negative.     Allergic/Immunologic: Negative.      A 10 point review of systems was performed and all the pertinent positives have been mentioned. Rest of review of systems was negative.        PHYSICAL EXAM:     Vitals:    23 1300   BP: (!) 146/88   Pulse: 86   Resp: 15    Body mass index is 23.77 kg/m².  Weight: 66.8 kg (147 lb 4.3 oz)   Height: 5' 6" (167.6 cm)     Physical Exam  Constitutional:       Appearance: Normal appearance. She is well-developed.   HENT:      Head: Normocephalic.   Eyes:      Pupils: Pupils are equal, round, and reactive to light.   Cardiovascular:      Rate and Rhythm: Normal rate and regular rhythm.   Pulmonary:      Effort: Pulmonary effort is normal.      Breath sounds: Normal breath sounds.   Abdominal:      General: Bowel sounds are normal.      Palpations: Abdomen is " soft.      Tenderness: There is no abdominal tenderness.   Musculoskeletal:         General: Normal range of motion.      Cervical back: Normal range of motion and neck supple.   Skin:     General: Skin is warm.   Neurological:      Mental Status: She is alert and oriented to person, place, and time.         DATA:     Laboratory:  CBC:  Recent Labs   Lab 12/11/22  0826 12/12/22  0706 12/14/22  0341   WBC 9.28 11.03 8.60   Hemoglobin 14.5 14.0 13.0   Hematocrit 41.8 42.1 39.4   Platelets 293 280 275       CHEMISTRIES:  Recent Labs   Lab 01/04/22  0620 01/05/22  0651 12/11/22  0826 12/12/22  0706 12/14/22  0340   Glucose 97 134 H 120 H 99 99   Sodium 138 137 142 139 143   Potassium 3.6 3.8 4.0 3.4 L 4.3   BUN 13 9 15 9 13   Creatinine 0.7 0.6 0.7 0.8 0.7   eGFR if African American >60.0 >60.0  --   --   --    eGFR if non African American >60.0 >60.0  --   --   --    Calcium 9.3 8.8 8.9 8.5 L 8.8   Magnesium  --  1.7  --   --   --        CARDIAC BIOMARKERS:  Recent Labs   Lab 12/11/22  1548 12/12/22  0007 12/12/22  0706   Troponin I 0.046 H 0.097 H 0.050 H       COAGS:  Recent Labs   Lab 01/04/22  0620 12/12/22  0706   INR 1.0 1.0       LIPIDS/LFTS:  Recent Labs   Lab 12/11/22  0826 12/11/22  1527 12/11/22  1532 12/12/22  0706 12/14/22  0340   Cholesterol 298 H  --  301 H  --   --    Triglycerides 270 H  --  191 H  --   --    HDL 45  --  50  --   --    LDL Cholesterol 199.0 H  --  212.8 H  --   --    Non-HDL Cholesterol 253  --  251  --   --    AST 23 32  --  24 17   ALT 15 25  --  16 15       Hemoglobin A1C   Date Value Ref Range Status   07/23/2011 5.2 4.0 - 6.2 % Final       TSH  Recent Labs   Lab 06/21/21  1054   TSH 3.007       The 10-year ASCVD risk score (Breonna CHAPA, et al., 2019) is: 25.3%    Values used to calculate the score:      Age: 72 years      Sex: Female      Is Non- : No      Diabetic: No      Tobacco smoker: No      Systolic Blood Pressure: 146 mmHg      Is BP treated: Yes       HDL Cholesterol: 50 mg/dL      Total Cholesterol: 301 mg/dL             ASSESSMENT AND PLAN     Patient Active Problem List   Diagnosis    Dyspnea on exertion    Dysphagia    Essential tremor    Scanning speech    Depression    Adjustment disorder with anxious mood    Chest pain, atypical    Pulmonary nodules    HTN (hypertension)    Abdominal pain    Calculus of gallbladder with acute cholecystitis without obstruction    Dysphonia       Patient with multiple risk factors for coronary artery disease going for gallbladder surgery.  Occasional chest pains.  Had not been able to achieve target heart rate during exercise stress test a few years ago.  Will do pharmacological nuclear stress test.  Further evaluation and risk assessment.  Follow-up after testing          Thank you very much for involving me in the care of your patient.  Please do not hesitate to contact me if there are any questions.      Carla Mayers MD, FACC, [facility]  Interventional Cardiologist, [facility].           This note was dictated with the help of speech recognition software.  There might be un-intended errors and/or substitutions.

## 2023-01-27 NOTE — TELEPHONE ENCOUNTER
Spoke with  she stop by the office today to cancel surgery with . she stated she have a few test that need to be done before surgery and will contact office for an appt. To reschedule.

## 2023-01-30 ENCOUNTER — HOSPITAL ENCOUNTER (OUTPATIENT)
Dept: RADIOLOGY | Facility: HOSPITAL | Age: 73
Discharge: HOME | End: 2023-01-30
Attending: INTERNAL MEDICINE
Payer: MEDICARE

## 2023-01-30 ENCOUNTER — HOSPITAL ENCOUNTER (OUTPATIENT)
Dept: CARDIOLOGY | Facility: HOSPITAL | Age: 73
Discharge: HOME | End: 2023-01-30
Attending: INTERNAL MEDICINE
Payer: MEDICARE

## 2023-01-30 DIAGNOSIS — R07.9 CHEST PAIN, UNSPECIFIED TYPE: ICD-10-CM

## 2023-01-30 LAB
CV STRESS BASE HR: 80 BPM
DIASTOLIC BLOOD PRESSURE: 82 MMHG
OHS CV CPX 85 PERCENT MAX PREDICTED HEART RATE MALE: 120
OHS CV CPX MAX PREDICTED HEART RATE: 141
OHS CV CPX PATIENT IS FEMALE: 1
OHS CV CPX PATIENT IS MALE: 0
OHS CV CPX PEAK DIASTOLIC BLOOD PRESSURE: 78 MMHG
OHS CV CPX PEAK HEAR RATE: 109 BPM
OHS CV CPX PEAK RATE PRESSURE PRODUCT: NORMAL
OHS CV CPX PEAK SYSTOLIC BLOOD PRESSURE: 137 MMHG
OHS CV CPX PERCENT MAX PREDICTED HEART RATE ACHIEVED: 77
OHS CV CPX RATE PRESSURE PRODUCT PRESENTING: NORMAL
SYSTOLIC BLOOD PRESSURE: 139 MMHG

## 2023-01-30 PROCEDURE — 93018 NUCLEAR STRESS - CARDIOLOGY INTERPRETED (CUPID ONLY): ICD-10-PCS | Mod: ,,, | Performed by: INTERNAL MEDICINE

## 2023-01-30 PROCEDURE — 93016 CV STRESS TEST SUPVJ ONLY: CPT | Mod: ,,, | Performed by: INTERNAL MEDICINE

## 2023-01-30 PROCEDURE — 93016 NUCLEAR STRESS - CARDIOLOGY INTERPRETED (CUPID ONLY): ICD-10-PCS | Mod: ,,, | Performed by: INTERNAL MEDICINE

## 2023-01-30 PROCEDURE — A9502 TC99M TETROFOSMIN: HCPCS

## 2023-01-30 PROCEDURE — 78452 NUCLEAR STRESS - CARDIOLOGY INTERPRETED (CUPID ONLY): ICD-10-PCS | Mod: 26,,, | Performed by: INTERNAL MEDICINE

## 2023-01-30 PROCEDURE — 63600175 PHARM REV CODE 636 W HCPCS: Performed by: INTERNAL MEDICINE

## 2023-01-30 PROCEDURE — 78452 HT MUSCLE IMAGE SPECT MULT: CPT | Mod: 26,,, | Performed by: INTERNAL MEDICINE

## 2023-01-30 PROCEDURE — 93017 CV STRESS TEST TRACING ONLY: CPT

## 2023-01-30 PROCEDURE — 78452 HT MUSCLE IMAGE SPECT MULT: CPT

## 2023-01-30 PROCEDURE — 93018 CV STRESS TEST I&R ONLY: CPT | Mod: ,,, | Performed by: INTERNAL MEDICINE

## 2023-01-30 RX ORDER — REGADENOSON 0.08 MG/ML
0.4 INJECTION, SOLUTION INTRAVENOUS ONCE
Status: COMPLETED | OUTPATIENT
Start: 2023-01-30 | End: 2023-01-30

## 2023-01-30 RX ADMIN — REGADENOSON 0.4 MG: 0.08 INJECTION, SOLUTION INTRAVENOUS at 08:01

## 2023-01-31 ENCOUNTER — TELEPHONE (OUTPATIENT)
Dept: PULMONOLOGY | Facility: CLINIC | Age: 73
End: 2023-01-31

## 2023-01-31 ENCOUNTER — OFFICE VISIT (OUTPATIENT)
Dept: PULMONOLOGY | Facility: CLINIC | Age: 73
End: 2023-01-31
Payer: MEDICARE

## 2023-01-31 VITALS
SYSTOLIC BLOOD PRESSURE: 122 MMHG | WEIGHT: 146.25 LBS | HEIGHT: 66 IN | HEART RATE: 77 BPM | OXYGEN SATURATION: 97 % | DIASTOLIC BLOOD PRESSURE: 76 MMHG | BODY MASS INDEX: 23.5 KG/M2

## 2023-01-31 DIAGNOSIS — R06.09 DYSPNEA ON EXERTION: ICD-10-CM

## 2023-01-31 DIAGNOSIS — J84.9 ILD (INTERSTITIAL LUNG DISEASE): ICD-10-CM

## 2023-01-31 DIAGNOSIS — R13.10 DYSPHAGIA, UNSPECIFIED TYPE: ICD-10-CM

## 2023-01-31 PROCEDURE — 3078F DIAST BP <80 MM HG: CPT | Mod: CPTII,S$GLB,, | Performed by: INTERNAL MEDICINE

## 2023-01-31 PROCEDURE — 99999 PR PBB SHADOW E&M-EST. PATIENT-LVL III: ICD-10-PCS | Mod: PBBFAC,,, | Performed by: INTERNAL MEDICINE

## 2023-01-31 PROCEDURE — 3008F PR BODY MASS INDEX (BMI) DOCUMENTED: ICD-10-PCS | Mod: CPTII,S$GLB,, | Performed by: INTERNAL MEDICINE

## 2023-01-31 PROCEDURE — 3288F FALL RISK ASSESSMENT DOCD: CPT | Mod: CPTII,S$GLB,, | Performed by: INTERNAL MEDICINE

## 2023-01-31 PROCEDURE — 3008F BODY MASS INDEX DOCD: CPT | Mod: CPTII,S$GLB,, | Performed by: INTERNAL MEDICINE

## 2023-01-31 PROCEDURE — 1126F PR PAIN SEVERITY QUANTIFIED, NO PAIN PRESENT: ICD-10-PCS | Mod: CPTII,S$GLB,, | Performed by: INTERNAL MEDICINE

## 2023-01-31 PROCEDURE — 1159F MED LIST DOCD IN RCRD: CPT | Mod: CPTII,S$GLB,, | Performed by: INTERNAL MEDICINE

## 2023-01-31 PROCEDURE — 3288F PR FALLS RISK ASSESSMENT DOCUMENTED: ICD-10-PCS | Mod: CPTII,S$GLB,, | Performed by: INTERNAL MEDICINE

## 2023-01-31 PROCEDURE — 3074F SYST BP LT 130 MM HG: CPT | Mod: CPTII,S$GLB,, | Performed by: INTERNAL MEDICINE

## 2023-01-31 PROCEDURE — 99214 OFFICE O/P EST MOD 30 MIN: CPT | Mod: S$GLB,,, | Performed by: INTERNAL MEDICINE

## 2023-01-31 PROCEDURE — 3078F PR MOST RECENT DIASTOLIC BLOOD PRESSURE < 80 MM HG: ICD-10-PCS | Mod: CPTII,S$GLB,, | Performed by: INTERNAL MEDICINE

## 2023-01-31 PROCEDURE — 1101F PR PT FALLS ASSESS DOC 0-1 FALLS W/OUT INJ PAST YR: ICD-10-PCS | Mod: CPTII,S$GLB,, | Performed by: INTERNAL MEDICINE

## 2023-01-31 PROCEDURE — 99999 PR PBB SHADOW E&M-EST. PATIENT-LVL III: CPT | Mod: PBBFAC,,, | Performed by: INTERNAL MEDICINE

## 2023-01-31 PROCEDURE — 1126F AMNT PAIN NOTED NONE PRSNT: CPT | Mod: CPTII,S$GLB,, | Performed by: INTERNAL MEDICINE

## 2023-01-31 PROCEDURE — 3074F PR MOST RECENT SYSTOLIC BLOOD PRESSURE < 130 MM HG: ICD-10-PCS | Mod: CPTII,S$GLB,, | Performed by: INTERNAL MEDICINE

## 2023-01-31 PROCEDURE — 1159F PR MEDICATION LIST DOCUMENTED IN MEDICAL RECORD: ICD-10-PCS | Mod: CPTII,S$GLB,, | Performed by: INTERNAL MEDICINE

## 2023-01-31 PROCEDURE — 99214 PR OFFICE/OUTPT VISIT, EST, LEVL IV, 30-39 MIN: ICD-10-PCS | Mod: S$GLB,,, | Performed by: INTERNAL MEDICINE

## 2023-01-31 PROCEDURE — 1101F PT FALLS ASSESS-DOCD LE1/YR: CPT | Mod: CPTII,S$GLB,, | Performed by: INTERNAL MEDICINE

## 2023-01-31 RX ORDER — SODIUM CHLORIDE FOR INHALATION 3 %
VIAL, NEBULIZER (ML) INHALATION
Qty: 360 ML | Refills: 3 | Status: SHIPPED | OUTPATIENT
Start: 2023-01-31

## 2023-01-31 RX ORDER — IPRATROPIUM BROMIDE AND ALBUTEROL SULFATE 2.5; .5 MG/3ML; MG/3ML
3 SOLUTION RESPIRATORY (INHALATION) EVERY 6 HOURS PRN
Qty: 100 EACH | Refills: 1 | Status: SHIPPED | OUTPATIENT
Start: 2023-01-31

## 2023-01-31 RX ORDER — OMEPRAZOLE 20 MG/1
20 CAPSULE, DELAYED RELEASE ORAL DAILY
Qty: 30 CAPSULE | Refills: 11 | Status: SHIPPED | OUTPATIENT
Start: 2023-01-31 | End: 2024-01-31

## 2023-01-31 NOTE — PROGRESS NOTES
"Elsa Steel  was seen as a follow up.    CHIEF COMPLAINT:  hosp f/u      HISTORY OF PRESENT ILLNESS: Elsa Steel is a 72 y.o. female  has a past medical history of Dyslipidemia, HTN (hypertension), and Tremor.  Our first encounter was 3/10/21.  Patient smokes for 30 years up to 2 ppd.  Patient quit smoking in 1999.  Patient with mckeon x 1/2 block for past 2 years.  +daily coughing and wheezing.  Wheezing is worse at night.  Clear to yellowish phlegm.  Intermittent nasal congestion.  Occasional gerd (every 2-3 days). Occasional chest tightness a/w sob.  No fever/chill.  Still work at [employer] in [place].  +weight gain 25 lbs since 2020.  +solid and liquid food dysphagia.  Once every 2 weeks.      S/p speech and gi evaluation.  S/p egd with small hiatal hernia.      Since our last encounter, s/p DBS for essential tremor by Dr. Hernández.  +improvement in tremor.  Still with intermittent "choking sensation."  +dyspnea after 1/2 block.  S/p CT chest/abd for abd pain.  CT with basilar bronchiectasis and airspace disease.      PAST MEDICAL HISTORY:    Active Ambulatory Problems     Diagnosis Date Noted    Dyspnea on exertion 03/10/2021    Dysphagia 03/10/2021    Essential tremor 06/21/2021    Scanning speech 06/21/2021    Depression 09/22/2021    Adjustment disorder with anxious mood 10/15/2021    Chest pain, atypical 12/10/2021    Pulmonary nodules 12/11/2022    HTN (hypertension) 12/11/2022    Abdominal pain 12/11/2022    Calculus of gallbladder with acute cholecystitis without obstruction 12/12/2022    Dysphonia 12/15/2022    ILD (interstitial lung disease) 01/31/2023     Resolved Ambulatory Problems     Diagnosis Date Noted    No Resolved Ambulatory Problems     Past Medical History:   Diagnosis Date    Dyslipidemia     Tremor                 PAST SURGICAL HISTORY:    Past Surgical History:   Procedure Laterality Date    DEEP BRAIN STIMULATOR PLACEMENT Bilateral 1/4/2022    Procedure: INSERTION, DEEP BRAIN " STIMULATOR BILATERAL VIM;  Surgeon: Neal Hernández MD;  [facility];  Service: Neurosurgery;  Laterality: Bilateral;  BILATERAL (LEFT FIRST, RIGHT SECOND)  INSERTION ELECTRODES FOR DEEP BRAIN STIMULATION IN VENTRALIS INTERMEDIUS/TEDS & SCD/ MEDTRONICS, ALYSSIA WHITE. CO SURGERY WITH DR JT MITCHELL.    ESOPHAGOGASTRODUODENOSCOPY N/A 2021    Procedure: EGD (ESOPHAGOGASTRODUODENOSCOPY);  Surgeon: Juan R Watson MD;  Location: [facility];  Service: Endoscopy;  Laterality: N/A;  covid test  [place], instr portal -ml    HAND SURGERY      HYSTERECTOMY      INSERTION OF DEEP BRAIN STIMULATOR GENERATOR Left 2022    Procedure: INSERTION, PULSE GENERATOR, DEEP BRAIN STIMULATOR;  Surgeon: Neal Hernández MD;  [facility];  Service: Neurosurgery;  Laterality: Left;  INSERT PULSE GENERATOR FOR DEEP BRAIN STIMULATION/ TEDS & SCD/ MEDTRONICS, ALYSSIA WHITE.    PLACEMENT OF FIDUCIAL SCREW INTO SPINE N/A 2022    Procedure: INSERTION, FIDUCIAL SCREW, SKULL;  Surgeon: Neal Hernández MD;  [facility];  Service: Neurosurgery;  Laterality: N/A;  INSERTION FIDUCIAL SCREWS FOR DEEP BRAIN STIMULATION/ TEDS & ACD/ MEDTRONICS, ALYSSIA LANDRY/ CO SURGERY WITH DR JT MITCHELL         FAMILY HISTORY:                Family History   Problem Relation Age of Onset    Alzheimer's disease Mother     Cancer Father        SOCIAL HISTORY:          Tobacco:   Social History     Tobacco Use   Smoking Status Former    Packs/day: 2.00    Years: 30.00    Pack years: 60.00    Types: Cigarettes    Start date: 1968    Quit date: 1999    Years since quittin.0   Smokeless Tobacco Former     alcohol use:    Social History     Substance and Sexual Activity   Alcohol Use Not Currently               Occupation:  [employer].    ALLERGIES:    Review of patient's allergies indicates:   Allergen Reactions    Iodine and iodide containing products        CURRENT MEDICATIONS:    Current Outpatient  "Medications   Medication Sig Dispense Refill    amLODIPine (NORVASC) 5 MG tablet Take 5 mg by mouth once daily.      gabapentin (NEURONTIN) 300 MG capsule Take 300 mg by mouth 3 (three) times daily.      oxybutynin (DITROPAN) 5 MG Tab Take 5 mg by mouth 2 (two) times daily.      sertraline (ZOLOFT) 100 MG tablet Take 100 mg by mouth once daily.      triamcinolone acetonide 0.1% (KENALOG) 0.1 % ointment Apply topically 2 (two) times daily. 15 g 1    albuterol-ipratropium (DUO-NEB) 2.5 mg-0.5 mg/3 mL nebulizer solution Take 3 mLs by nebulization every 6 (six) hours as needed for Wheezing. 100 each 1    omeprazole (PRILOSEC) 20 MG capsule Take 1 capsule (20 mg total) by mouth once daily. 30 capsule 11    primidone (MYSOLINE) 50 MG Tab Take 2 tablets (100 mg total) by mouth 3 (three) times daily. 180 tablet 11    sodium chloride 3% 3 % nebulizer solution 4 cc of 3% saline into nebs twice daily prn 360 mL 3     No current facility-administered medications for this visit.                  REVIEW OF SYSTEMS:     Pulmonary related symptoms as per HPI.  Gen:  no weight loss, +weight gain 25 lbs since 2020, no fever, no night sweat  HEENT:  no visual changes, no sore throat, no hearing loss  CV:  Per hpi  GI:  no melena, no hematochezia, no diarhea, no constipation.  :  no dysuria, no hematuria, no hesistancy, no dribbling  Neuro:  no syncope, no vertigo, no tinnitus, occasional balance issue a/w driving.  +tremor  Psych:  No homocide or suicide ideation; + depression.  Endocrine:  No heat or cold intolerance.  Sleep:  No snoring; no witnessed apnea.  Rested upon awake.    Otherwise, a balance of systems reviewed is negative.          PHYSICAL EXAM:  Vitals:    01/31/23 0803   BP: 122/76   Pulse: 77   SpO2: 97%   Weight: 66.3 kg (146 lb 4.4 oz)   Height: 5' 6" (1.676 m)   PainSc: 0-No pain     Body mass index is 23.61 kg/m².     GENERAL:  well develop; no apparent distress  HEENT:  no nasal congestion; no discharge noted; " class 3 modified mallampatti.   NECK:  supple; no palpable masses.  CARDIO: regular rate and rhythm  PULM:  clear to auscultation bilaterally; no intercostals retractions; no accessory muscle usage   ABDOMEN:  soft nontender/nondistended.  +bowel sound  EXTREMITIES no cce  NEURO:  CN II-XII intact.  5/5 motor in all extremities.  sensation grossly intact   to light touch.  PSYCH:  normal affect.  Alert and oriented x 4    LABS  Pulmonary Functions Testing Results(personally reviewed):    PFT 3/16/21  Ratio of 58%; FVC 2.35 L (79%); FEV1 1.36 L (60%); TLC 3.98 L (75%); dlco 12 (55%)   ABG (personally reviewed):  none  CXR (personally reviewed):  7/21/20 no consolidation or effusion.  Bilateral implants.  Poor inspiratory effort.  ?increase reticulation  CT CHEST(personally reviewed):   12/11/22 basilar bronchiectasis and airspace disease.      Stress echo 4/22/21  The left ventricle is normal in size with normal systolic function.  The estimated ejection fraction is 55%.  Normal left ventricular diastolic function.  Normal right ventricular size with normal right ventricular systolic function.  Mild mitral regurgitation.  Intermediate central venous pressure (8 mmHg).  The estimated PA systolic pressure is 38 mmHg.  Trivial posterior pericardial effusion.  There were no arrhythmias during stress.  The ECG portion of this study is negative for myocardial ischemia.  The stress echo portion of this study is negative for myocardial ischemia.  Only 79% of maximum predicted heart rate achieved. Consider repeating as a pharmacological nuclear stress test      ASSESSMENT/PLAN  Problem List Items Addressed This Visit       Dysphagia    Overview     -Intermittent solid and liquid dysphagia.  Normal swallow study without evidence of aspiration.  S/p egd with normal esophagus and positive hiatal hernia.   - In the past, patient declined ppi due to lack of symptoms.    -recently started on famotidine (12/22) with improvement in  symptoms  -ct with basilar airspace space disease c/w chronic aspiration.    -will switch to ppi for better efficacy         Relevant Medications    omeprazole (PRILOSEC) 20 MG capsule    Dyspnea on exertion    Overview     - pft with restrictive physiology and reduced dlco.  Stress echo without evidence of cad.  PAP 38 mm Hg.    -CT 12/22 with dependant airspace disease and basilar bronchiectasis.   -airway way clearance with nebs and hypertonic saline.    -will repeat pft and 6 min walk.  May need oxygen.           ILD (interstitial lung disease)    Overview     -basilar airspace disease and traction bronchietasis.  No honeycombing.  ?chronic aspiration.   -was on famotidine.  Now on ppi.  -await pft         Relevant Medications    sodium chloride 3% 3 % nebulizer solution    albuterol-ipratropium (DUO-NEB) 2.5 mg-0.5 mg/3 mL nebulizer solution    Other Relevant Orders    Complete PFT with bronchodilator    Stress test, pulmonary    NEBULIZER FOR HOME USE         Patient will No follow-ups on file. with md/np.    25 minutes of total time spent on the encounter, which includes face to face time and non-face to face time preparing to see the patient (eg, review of tests), Obtaining and/or reviewing separately obtained history, documenting clinical information in the electronic or other health record, independently interpreting results (not separately reported) and communicating results to the patient/family/caregiver, or Care coordination (not separately reported).

## 2023-02-01 NOTE — TELEPHONE ENCOUNTER
Answered questions.                ----- Message from Deysi Gilliland MA sent at 1/31/2023  4:06 PM CST -----  Rosalba Jackson V Staff  Caller: Unspecified (Today,  4:05 PM)  Type: Patient Call Back         Who called: self         What is the request in detail: Pt states she has questions about her medication she received this morning ...         Can the clinic reply by [facility]? No         Would the patient rather a call back or a response via [facility]? Yes         Best call back number: [phone] (home)                   Thank You

## 2023-02-06 ENCOUNTER — OFFICE VISIT (OUTPATIENT)
Dept: CARDIOLOGY | Facility: CLINIC | Age: 73
End: 2023-02-06
Payer: MEDICARE

## 2023-02-06 VITALS
BODY MASS INDEX: 23.61 KG/M2 | RESPIRATION RATE: 18 BRPM | DIASTOLIC BLOOD PRESSURE: 82 MMHG | SYSTOLIC BLOOD PRESSURE: 128 MMHG | WEIGHT: 146.25 LBS | HEART RATE: 77 BPM

## 2023-02-06 DIAGNOSIS — R13.10 DYSPHAGIA, UNSPECIFIED TYPE: ICD-10-CM

## 2023-02-06 DIAGNOSIS — R07.89 CHEST PAIN, ATYPICAL: ICD-10-CM

## 2023-02-06 DIAGNOSIS — I10 HYPERTENSION, UNSPECIFIED TYPE: ICD-10-CM

## 2023-02-06 DIAGNOSIS — G25.0 ESSENTIAL TREMOR: ICD-10-CM

## 2023-02-06 DIAGNOSIS — R06.09 DYSPNEA ON EXERTION: Primary | ICD-10-CM

## 2023-02-06 DIAGNOSIS — J84.9 ILD (INTERSTITIAL LUNG DISEASE): ICD-10-CM

## 2023-02-06 DIAGNOSIS — F32.A DEPRESSION, UNSPECIFIED DEPRESSION TYPE: ICD-10-CM

## 2023-02-06 PROCEDURE — 3074F SYST BP LT 130 MM HG: CPT | Mod: CPTII,S$GLB,, | Performed by: INTERNAL MEDICINE

## 2023-02-06 PROCEDURE — 99999 PR PBB SHADOW E&M-EST. PATIENT-LVL III: CPT | Mod: PBBFAC,,, | Performed by: INTERNAL MEDICINE

## 2023-02-06 PROCEDURE — 1159F MED LIST DOCD IN RCRD: CPT | Mod: CPTII,S$GLB,, | Performed by: INTERNAL MEDICINE

## 2023-02-06 PROCEDURE — 3008F PR BODY MASS INDEX (BMI) DOCUMENTED: ICD-10-PCS | Mod: CPTII,S$GLB,, | Performed by: INTERNAL MEDICINE

## 2023-02-06 PROCEDURE — 3008F BODY MASS INDEX DOCD: CPT | Mod: CPTII,S$GLB,, | Performed by: INTERNAL MEDICINE

## 2023-02-06 PROCEDURE — 3074F PR MOST RECENT SYSTOLIC BLOOD PRESSURE < 130 MM HG: ICD-10-PCS | Mod: CPTII,S$GLB,, | Performed by: INTERNAL MEDICINE

## 2023-02-06 PROCEDURE — 1126F PR PAIN SEVERITY QUANTIFIED, NO PAIN PRESENT: ICD-10-PCS | Mod: CPTII,S$GLB,, | Performed by: INTERNAL MEDICINE

## 2023-02-06 PROCEDURE — 1159F PR MEDICATION LIST DOCUMENTED IN MEDICAL RECORD: ICD-10-PCS | Mod: CPTII,S$GLB,, | Performed by: INTERNAL MEDICINE

## 2023-02-06 PROCEDURE — 3079F PR MOST RECENT DIASTOLIC BLOOD PRESSURE 80-89 MM HG: ICD-10-PCS | Mod: CPTII,S$GLB,, | Performed by: INTERNAL MEDICINE

## 2023-02-06 PROCEDURE — 99214 OFFICE O/P EST MOD 30 MIN: CPT | Mod: S$GLB,,, | Performed by: INTERNAL MEDICINE

## 2023-02-06 PROCEDURE — 1126F AMNT PAIN NOTED NONE PRSNT: CPT | Mod: CPTII,S$GLB,, | Performed by: INTERNAL MEDICINE

## 2023-02-06 PROCEDURE — 1160F PR REVIEW ALL MEDS BY PRESCRIBER/CLIN PHARMACIST DOCUMENTED: ICD-10-PCS | Mod: CPTII,S$GLB,, | Performed by: INTERNAL MEDICINE

## 2023-02-06 PROCEDURE — 99999 PR PBB SHADOW E&M-EST. PATIENT-LVL III: ICD-10-PCS | Mod: PBBFAC,,, | Performed by: INTERNAL MEDICINE

## 2023-02-06 PROCEDURE — 99214 PR OFFICE/OUTPT VISIT, EST, LEVL IV, 30-39 MIN: ICD-10-PCS | Mod: S$GLB,,, | Performed by: INTERNAL MEDICINE

## 2023-02-06 PROCEDURE — 3079F DIAST BP 80-89 MM HG: CPT | Mod: CPTII,S$GLB,, | Performed by: INTERNAL MEDICINE

## 2023-02-06 PROCEDURE — 1160F RVW MEDS BY RX/DR IN RCRD: CPT | Mod: CPTII,S$GLB,, | Performed by: INTERNAL MEDICINE

## 2023-02-06 NOTE — PROGRESS NOTES
CARDIOVASCULAR CONSULTATION    REASON FOR CONSULT:   Elsa Steel is a 72 y.o. female who presents for evaluation    HISTORY OF PRESENT ILLNESS:     Patient is a pleasant 72-year-old lady.  Saw Dr. Sharma in 2021, now wants to shift to my clinic.  States occasionally gets chest tightness.  No particular aggravating or relieving factors.  Had a stress test done 2 years ago where she did not reach maximum age predicted heart rate but overall the stress echo was negative for ischemia.  Wants to go for cholecystectomy surgery next week.  Denies orthopnea, PND.  Does have dyspnea on exertion which is chronic.    2021:  The left ventricle is normal in size with normal systolic function.  The estimated ejection fraction is 55%.  Normal left ventricular diastolic function.  Normal right ventricular size with normal right ventricular systolic function.  Mild mitral regurgitation.  Intermediate central venous pressure (8 mmHg).  The estimated PA systolic pressure is 38 mmHg.  Trivial posterior pericardial effusion.  There were no arrhythmias during stress.  The ECG portion of this study is negative for myocardial ischemia.  The stress echo portion of this study is negative for myocardial ischemia.  Only 79% of maximum predicted heart rate achieved. Consider repeating as a pharmacological nuclear stress test       2022:        The left ventricle is normal in size with mild concentric hypertrophy and normal systolic function.  The estimated ejection fraction is 65%.  Grade I left ventricular diastolic dysfunction.  Normal right ventricular size with normal right ventricular systolic function.  Mild left atrial enlargement.  Normal central venous pressure (3 mmHg).        Notes from February 2023: Patient here for follow-up.  Stress test did not show any significant ischemia.  States that her chest pressure has gone away.        Normal myocardial perfusion scan. There is no evidence of myocardial ischemia or infarction.    The  LVEF is not accurate due to poor software boundary tracking during stress.    The ECG portion of the study is negative for ischemia.    The patient reported no chest pain during the stress test.    There were no arrhythmias during stress.         PAST MEDICAL HISTORY:     Past Medical History:   Diagnosis Date    Dyslipidemia     HTN (hypertension)     Tremor        PAST SURGICAL HISTORY:     Past Surgical History:   Procedure Laterality Date    DEEP BRAIN STIMULATOR PLACEMENT Bilateral 1/4/2022    Procedure: INSERTION, DEEP BRAIN STIMULATOR BILATERAL VIM;  Surgeon: Neal Hernández MD;  Location: [facility] OR [address] [place] [address];  Service: Neurosurgery;  Laterality: Bilateral;  BILATERAL (LEFT FIRST, RIGHT SECOND)  INSERTION ELECTRODES FOR DEEP BRAIN STIMULATION IN VENTRALIS INTERMEDIUS/TEDS & SCD/ [facility], ALYSSIA WHITE. CO SURGERY WITH DR JT MITCHELL.    ESOPHAGOGASTRODUODENOSCOPY N/A 5/21/2021    Procedure: EGD (ESOPHAGOGASTRODUODENOSCOPY);  Surgeon: Juan R Watson MD;  Location: [facility];  Service: Endoscopy;  Laterality: N/A;  covid test 5/18 [place], instr portal -ml    HAND SURGERY      HYSTERECTOMY      INSERTION OF DEEP BRAIN STIMULATOR GENERATOR Left 1/11/2022    Procedure: INSERTION, PULSE GENERATOR, DEEP BRAIN STIMULATOR;  Surgeon: Neal Hernández MD;  Location: [facility] OR [address] [place] [address];  Service: Neurosurgery;  Laterality: Left;  INSERT PULSE GENERATOR FOR DEEP BRAIN STIMULATION/ TEDS & SCD/ [facility], ALYSSIA WHITE.    PLACEMENT OF FIDUCIAL SCREW INTO SPINE N/A 1/4/2022    Procedure: INSERTION, FIDUCIAL SCREW, SKULL;  Surgeon: Neal Hernández MD;  Location: [facility] OR [address] [place] [address];  Service: Neurosurgery;  Laterality: N/A;  INSERTION FIDUCIAL SCREWS FOR DEEP BRAIN STIMULATION/ TEDS & ACD/ [employer], ALYSSIA WHITE/ CO SURGERY WITH DR JT MITCHELL       ALLERGIES AND MEDICATION:     Review of patient's allergies indicates:   Allergen Reactions    Iodine and iodide containing products         Medication List            Accurate as of  2023  1:46 PM. If you have any questions, ask your nurse or doctor.                CONTINUE taking these medications      albuterol-ipratropium 2.5 mg-0.5 mg/3 mL nebulizer solution  Commonly known as: DUO-NEB  Take 3 mLs by nebulization every 6 (six) hours as needed for Wheezing.     amLODIPine 5 MG tablet  Commonly known as: NORVASC     gabapentin 300 MG capsule  Commonly known as: NEURONTIN     omeprazole 20 MG capsule  Commonly known as: PRILOSEC  Take 1 capsule (20 mg total) by mouth once daily.     oxybutynin 5 MG Tab  Commonly known as: DITROPAN     sertraline 100 MG tablet  Commonly known as: ZOLOFT     sodium chloride 3% 3 % nebulizer solution  4 cc of 3% saline into nebs twice daily prn     triamcinolone acetonide 0.1% 0.1 % ointment  Commonly known as: KENALOG  Apply topically 2 (two) times daily.              SOCIAL HISTORY:     Social History     Socioeconomic History    Marital status: Single   Tobacco Use    Smoking status: Former     Packs/day: 2.00     Years: 30.00     Pack years: 60.00     Types: Cigarettes     Start date: 1968     Quit date: 1999     Years since quittin.1    Smokeless tobacco: Former   Substance and Sexual Activity    Alcohol use: Not Currently    Drug use: Never       FAMILY HISTORY:     Family History   Problem Relation Age of Onset    Alzheimer's disease Mother     Cancer Father        REVIEW OF SYSTEMS:   Review of Systems   Constitutional: Negative.   HENT: Negative.     Eyes: Negative.    Respiratory: Negative.     Endocrine: Negative.    Hematologic/Lymphatic: Negative.    Skin: Negative.    Musculoskeletal: Negative.    Gastrointestinal: Negative.    Genitourinary: Negative.    Neurological: Negative.    Psychiatric/Behavioral: Negative.     Allergic/Immunologic: Negative.      A 10 point review of systems was performed and all the pertinent positives have been mentioned. Rest of review of systems was negative.        PHYSICAL EXAM:     Vitals:     02/06/23 1336   BP: 128/82   Pulse: 77   Resp: 18    Body mass index is 23.61 kg/m².  Weight: 66.3 kg (146 lb 4.4 oz)         Physical Exam  Constitutional:       Appearance: Normal appearance. She is well-developed.   HENT:      Head: Normocephalic.   Eyes:      Pupils: Pupils are equal, round, and reactive to light.   Cardiovascular:      Rate and Rhythm: Normal rate and regular rhythm.   Pulmonary:      Effort: Pulmonary effort is normal.      Breath sounds: Normal breath sounds.   Abdominal:      General: Bowel sounds are normal.      Palpations: Abdomen is soft.      Tenderness: There is no abdominal tenderness.   Musculoskeletal:         General: Normal range of motion.      Cervical back: Normal range of motion and neck supple.   Skin:     General: Skin is warm.   Neurological:      Mental Status: She is alert and oriented to person, place, and time.         DATA:     Laboratory:  CBC:  Recent Labs   Lab 12/11/22  0826 12/12/22  0706 12/14/22  0341   WBC 9.28 11.03 8.60   Hemoglobin 14.5 14.0 13.0   Hematocrit 41.8 42.1 39.4   Platelets 293 280 275         CHEMISTRIES:  Recent Labs   Lab 01/04/22  0620 01/05/22  0651 12/11/22  0826 12/12/22  0706 12/14/22  0340   Glucose 97 134 H 120 H 99 99   Sodium 138 137 142 139 143   Potassium 3.6 3.8 4.0 3.4 L 4.3   BUN 13 9 15 9 13   Creatinine 0.7 0.6 0.7 0.8 0.7   eGFR if African American >60.0 >60.0  --   --   --    eGFR if non African American >60.0 >60.0  --   --   --    Calcium 9.3 8.8 8.9 8.5 L 8.8   Magnesium  --  1.7  --   --   --          CARDIAC BIOMARKERS:  Recent Labs   Lab 12/11/22  1548 12/12/22  0007 12/12/22  0706   Troponin I 0.046 H 0.097 H 0.050 H         COAGS:  Recent Labs   Lab 01/04/22  0620 12/12/22  0706   INR 1.0 1.0         LIPIDS/LFTS:  Recent Labs   Lab 12/11/22  0826 12/11/22  1527 12/11/22  1532 12/12/22  0706 12/14/22  0340   Cholesterol 298 H  --  301 H  --   --    Triglycerides 270 H  --  191 H  --   --    HDL 45  --  50  --   --     LDL Cholesterol 199.0 H  --  212.8 H  --   --    Non-HDL Cholesterol 253  --  251  --   --    AST 23 32  --  24 17   ALT 15 25  --  16 15         Hemoglobin A1C   Date Value Ref Range Status   07/23/2011 5.2 4.0 - 6.2 % Final       TSH  Recent Labs   Lab 06/21/21  1054   TSH 3.007         The 10-year ASCVD risk score (Breonna CHAPA, et al., 2019) is: 20.1%    Values used to calculate the score:      Age: 72 years      Sex: Female      Is Non- : No      Diabetic: No      Tobacco smoker: No      Systolic Blood Pressure: 128 mmHg      Is BP treated: Yes      HDL Cholesterol: 50 mg/dL      Total Cholesterol: 301 mg/dL             ASSESSMENT AND PLAN     Patient Active Problem List   Diagnosis    Dyspnea on exertion    Dysphagia    Essential tremor    Scanning speech    Depression    Adjustment disorder with anxious mood    Chest pain, atypical    Pulmonary nodules    HTN (hypertension)    Abdominal pain    Calculus of gallbladder with acute cholecystitis without obstruction    Dysphonia    ILD (interstitial lung disease)       Patient with multiple risk factors for coronary artery disease going for gallbladder surgery.   Stress test did not show any significant ischemia.  Patient may proceed for gallbladder surgery at low-to-moderate risk for coronary ischemia during surgery.    Follow-up after  6 months      Thank you very much for involving me in the care of your patient.  Please do not hesitate to contact me if there are any questions.      Carla Mayers MD, FACC, [facility]  Interventional Cardiologist, [facility].           This note was dictated with the help of speech recognition software.  There might be un-intended errors and/or substitutions.

## 2023-02-15 ENCOUNTER — HOSPITAL ENCOUNTER (OUTPATIENT)
Dept: RESPIRATORY THERAPY | Facility: HOSPITAL | Age: 73
Discharge: HOME | End: 2023-02-15
Attending: INTERNAL MEDICINE
Payer: MEDICARE

## 2023-02-15 VITALS — RESPIRATION RATE: 19 BRPM | HEART RATE: 77 BPM

## 2023-02-15 DIAGNOSIS — J84.9 ILD (INTERSTITIAL LUNG DISEASE): ICD-10-CM

## 2023-02-15 LAB
BRPFT: NORMAL
DLCO ADJ PRE: 10.31 ML/(MIN*MMHG)
DLCO SINGLE BREATH LLN: 16.05
DLCO SINGLE BREATH PRE REF: 46.7 %
DLCO SINGLE BREATH REF: 21.78
DLCOC SBVA LLN: 2.81
DLCOC SBVA PRE REF: 67.8 %
DLCOC SBVA REF: 4.16
DLCOC SINGLE BREATH LLN: 16.05
DLCOC SINGLE BREATH PRE REF: 47.3 %
DLCOC SINGLE BREATH REF: 21.78
DLCOVA LLN: 2.81
DLCOVA PRE REF: 67 %
DLCOVA PRE: 2.79 ML/(MIN*MMHG*L)
DLCOVA REF: 4.16
DLVAADJ PRE: 2.82 ML/(MIN*MMHG*L)
ERVN2 LLN: -16449.39
ERVN2 PRE REF: 100.3 %
ERVN2 PRE: 0.61 L
ERVN2 REF: 0.61
FEF 25 75 CHG: -4.1 %
FEF 25 75 LLN: 0.79
FEF 25 75 POST REF: 43.8 %
FEF 25 75 PRE REF: 45.6 %
FEF 25 75 REF: 1.78
FET100 CHG: 1.7 %
FEV1 CHG: 1.5 %
FEV1 FVC CHG: -1.8 %
FEV1 FVC LLN: 64
FEV1 FVC POST REF: 84.8 %
FEV1 FVC PRE REF: 86.3 %
FEV1 FVC REF: 77
FEV1 LLN: 1.58
FEV1 POST REF: 72.7 %
FEV1 PRE REF: 71.6 %
FEV1 REF: 2.2
FRCN2 LLN: 1.99
FRCN2 PRE REF: 103.4 %
FRCN2 REF: 2.81
FVC CHG: 3.3 %
FVC LLN: 2.06
FVC POST REF: 84.8 %
FVC PRE REF: 82 %
FVC REF: 2.88
IVC PRE: 2.2 L
IVC SINGLE BREATH LLN: 2.06
IVC SINGLE BREATH PRE REF: 76.6 %
IVC SINGLE BREATH REF: 2.88
PEF CHG: 5 %
PEF LLN: 3.8
PEF POST REF: 100.4 %
PEF PRE REF: 95.6 %
PEF REF: 5.6
POST FEF 25 75: 0.78 L/S
POST FET 100: 7.2 SEC
POST FEV1 FVC: 65.63 %
POST FEV1: 1.6 L
POST FVC: 2.44 L
POST PEF: 5.62 L/S
PRE DLCO: 10.18 ML/(MIN*MMHG)
PRE FEF 25 75: 0.81 L/S
PRE FET 100: 7.08 SEC
PRE FEV1 FVC: 66.81 %
PRE FEV1: 1.58 L
PRE FRC N2: 2.91 L
PRE FVC: 2.36 L
PRE PEF: 5.35 L/S
RVN2 LLN: 1.63
RVN2 PRE REF: 104.2 %
RVN2 PRE: 2.3 L
RVN2 REF: 2.21
RVN2TLCN2 LLN: 34.53
RVN2TLCN2 PRE REF: 111.6 %
RVN2TLCN2 PRE: 49.25 %
RVN2TLCN2 REF: 44.12
TLCN2 LLN: 4.24
TLCN2 PRE REF: 89.3 %
TLCN2 PRE: 4.67 L
TLCN2 REF: 5.23
VA PRE: 3.66 L
VA SINGLE BREATH LLN: 5.08
VA SINGLE BREATH PRE REF: 72.1 %
VA SINGLE BREATH REF: 5.08
VCMAXN2 LLN: 2.06
VCMAXN2 PRE REF: 82.4 %
VCMAXN2 PRE: 2.37 L
VCMAXN2 REF: 2.88

## 2023-02-15 PROCEDURE — 94618 PULMONARY STRESS TESTING: ICD-10-PCS | Mod: 26,,, | Performed by: INTERNAL MEDICINE

## 2023-02-15 PROCEDURE — 94618 PULMONARY STRESS TESTING: CPT

## 2023-02-15 PROCEDURE — 94729 PR C02/MEMBANE DIFFUSE CAPACITY: ICD-10-PCS | Mod: 26,,, | Performed by: INTERNAL MEDICINE

## 2023-02-15 PROCEDURE — 94060 EVALUATION OF WHEEZING: CPT

## 2023-02-15 PROCEDURE — 94729 DIFFUSING CAPACITY: CPT

## 2023-02-15 PROCEDURE — 94727 GAS DIL/WSHOT DETER LNG VOL: CPT | Mod: 26,,, | Performed by: INTERNAL MEDICINE

## 2023-02-15 PROCEDURE — 25000242 PHARM REV CODE 250 ALT 637 W/ HCPCS: Performed by: INTERNAL MEDICINE

## 2023-02-15 PROCEDURE — 94727 GAS DIL/WSHOT DETER LNG VOL: CPT

## 2023-02-15 PROCEDURE — 94060 EVALUATION OF WHEEZING: CPT | Mod: 26,59,, | Performed by: INTERNAL MEDICINE

## 2023-02-15 PROCEDURE — 94618 PULMONARY STRESS TESTING: CPT | Mod: 26,,, | Performed by: INTERNAL MEDICINE

## 2023-02-15 PROCEDURE — 94727 PR PULM FUNCTION TEST BY GAS: ICD-10-PCS | Mod: 26,,, | Performed by: INTERNAL MEDICINE

## 2023-02-15 PROCEDURE — 94060 PR EVAL OF BRONCHOSPASM: ICD-10-PCS | Mod: 26,59,, | Performed by: INTERNAL MEDICINE

## 2023-02-15 PROCEDURE — 94729 DIFFUSING CAPACITY: CPT | Mod: 26,,, | Performed by: INTERNAL MEDICINE

## 2023-02-15 RX ORDER — ALBUTEROL SULFATE 2.5 MG/.5ML
2.5 SOLUTION RESPIRATORY (INHALATION) ONCE
Status: COMPLETED | OUTPATIENT
Start: 2023-02-15 | End: 2023-02-15

## 2023-02-15 RX ADMIN — ALBUTEROL SULFATE 2.5 MG: 2.5 SOLUTION RESPIRATORY (INHALATION) at 01:02

## 2023-03-03 ENCOUNTER — PATIENT MESSAGE (OUTPATIENT)
Dept: PULMONOLOGY | Facility: CLINIC | Age: 73
End: 2023-03-03
Payer: MEDICARE

## 2023-03-03 DIAGNOSIS — J84.9 ILD (INTERSTITIAL LUNG DISEASE): Primary | ICD-10-CM

## 2023-04-13 ENCOUNTER — LAB VISIT (OUTPATIENT)
Dept: LAB | Facility: HOSPITAL | Age: 73
End: 2023-04-13
Attending: PSYCHIATRY & NEUROLOGY
Payer: MEDICARE

## 2023-04-13 ENCOUNTER — OFFICE VISIT (OUTPATIENT)
Dept: NEUROLOGY | Facility: CLINIC | Age: 73
End: 2023-04-13
Payer: MEDICARE

## 2023-04-13 VITALS
SYSTOLIC BLOOD PRESSURE: 129 MMHG | WEIGHT: 140 LBS | DIASTOLIC BLOOD PRESSURE: 84 MMHG | BODY MASS INDEX: 22.5 KG/M2 | HEART RATE: 65 BPM | HEIGHT: 66 IN

## 2023-04-13 DIAGNOSIS — G25.0 ESSENTIAL TREMOR: ICD-10-CM

## 2023-04-13 DIAGNOSIS — R41.3 MEMORY CHANGE: Primary | ICD-10-CM

## 2023-04-13 DIAGNOSIS — R41.3 MEMORY CHANGE: ICD-10-CM

## 2023-04-13 LAB — TSH SERPL DL<=0.005 MIU/L-ACNC: 4.54 UIU/ML (ref 0.4–4)

## 2023-04-13 PROCEDURE — 99215 OFFICE O/P EST HI 40 MIN: CPT | Mod: 25,S$GLB,, | Performed by: PSYCHIATRY & NEUROLOGY

## 2023-04-13 PROCEDURE — 82607 VITAMIN B-12: CPT | Performed by: PSYCHIATRY & NEUROLOGY

## 2023-04-13 PROCEDURE — 36415 COLL VENOUS BLD VENIPUNCTURE: CPT | Performed by: PSYCHIATRY & NEUROLOGY

## 2023-04-13 PROCEDURE — 3008F BODY MASS INDEX DOCD: CPT | Mod: CPTII,S$GLB,, | Performed by: PSYCHIATRY & NEUROLOGY

## 2023-04-13 PROCEDURE — 3008F PR BODY MASS INDEX (BMI) DOCUMENTED: ICD-10-PCS | Mod: CPTII,S$GLB,, | Performed by: PSYCHIATRY & NEUROLOGY

## 2023-04-13 PROCEDURE — 3079F DIAST BP 80-89 MM HG: CPT | Mod: CPTII,S$GLB,, | Performed by: PSYCHIATRY & NEUROLOGY

## 2023-04-13 PROCEDURE — 1101F PT FALLS ASSESS-DOCD LE1/YR: CPT | Mod: CPTII,S$GLB,, | Performed by: PSYCHIATRY & NEUROLOGY

## 2023-04-13 PROCEDURE — 95984 PR ELEC ANALYSIS, IMPLT NEURO PULSE GEN, W/PRGRM, BRAIN,  EA ADDTL 15 MINS: ICD-10-PCS | Mod: S$GLB,,, | Performed by: PSYCHIATRY & NEUROLOGY

## 2023-04-13 PROCEDURE — 83921 ORGANIC ACID SINGLE QUANT: CPT | Performed by: PSYCHIATRY & NEUROLOGY

## 2023-04-13 PROCEDURE — 3079F PR MOST RECENT DIASTOLIC BLOOD PRESSURE 80-89 MM HG: ICD-10-PCS | Mod: CPTII,S$GLB,, | Performed by: PSYCHIATRY & NEUROLOGY

## 2023-04-13 PROCEDURE — 1159F PR MEDICATION LIST DOCUMENTED IN MEDICAL RECORD: ICD-10-PCS | Mod: CPTII,S$GLB,, | Performed by: PSYCHIATRY & NEUROLOGY

## 2023-04-13 PROCEDURE — 84443 ASSAY THYROID STIM HORMONE: CPT | Performed by: PSYCHIATRY & NEUROLOGY

## 2023-04-13 PROCEDURE — 3288F PR FALLS RISK ASSESSMENT DOCUMENTED: ICD-10-PCS | Mod: CPTII,S$GLB,, | Performed by: PSYCHIATRY & NEUROLOGY

## 2023-04-13 PROCEDURE — 99215 PR OFFICE/OUTPT VISIT, EST, LEVL V, 40-54 MIN: ICD-10-PCS | Mod: 25,S$GLB,, | Performed by: PSYCHIATRY & NEUROLOGY

## 2023-04-13 PROCEDURE — 3074F SYST BP LT 130 MM HG: CPT | Mod: CPTII,S$GLB,, | Performed by: PSYCHIATRY & NEUROLOGY

## 2023-04-13 PROCEDURE — 95983 PR ELEC ANALYSIS, IMPLT NEURO PULSE GEN, W/PRGRM, BRAIN, 1ST 15 MINS: ICD-10-PCS | Mod: S$GLB,,, | Performed by: PSYCHIATRY & NEUROLOGY

## 2023-04-13 PROCEDURE — 1126F PR PAIN SEVERITY QUANTIFIED, NO PAIN PRESENT: ICD-10-PCS | Mod: CPTII,S$GLB,, | Performed by: PSYCHIATRY & NEUROLOGY

## 2023-04-13 PROCEDURE — 1126F AMNT PAIN NOTED NONE PRSNT: CPT | Mod: CPTII,S$GLB,, | Performed by: PSYCHIATRY & NEUROLOGY

## 2023-04-13 PROCEDURE — 3074F PR MOST RECENT SYSTOLIC BLOOD PRESSURE < 130 MM HG: ICD-10-PCS | Mod: CPTII,S$GLB,, | Performed by: PSYCHIATRY & NEUROLOGY

## 2023-04-13 PROCEDURE — 3288F FALL RISK ASSESSMENT DOCD: CPT | Mod: CPTII,S$GLB,, | Performed by: PSYCHIATRY & NEUROLOGY

## 2023-04-13 PROCEDURE — 95983 ALYS BRN NPGT PRGRMG 15 MIN: CPT | Mod: S$GLB,,, | Performed by: PSYCHIATRY & NEUROLOGY

## 2023-04-13 PROCEDURE — 84439 ASSAY OF FREE THYROXINE: CPT | Performed by: PSYCHIATRY & NEUROLOGY

## 2023-04-13 PROCEDURE — 95984 ALYS BRN NPGT PRGRMG ADDL 15: CPT | Mod: S$GLB,,, | Performed by: PSYCHIATRY & NEUROLOGY

## 2023-04-13 PROCEDURE — 1101F PR PT FALLS ASSESS DOC 0-1 FALLS W/OUT INJ PAST YR: ICD-10-PCS | Mod: CPTII,S$GLB,, | Performed by: PSYCHIATRY & NEUROLOGY

## 2023-04-13 PROCEDURE — 1159F MED LIST DOCD IN RCRD: CPT | Mod: CPTII,S$GLB,, | Performed by: PSYCHIATRY & NEUROLOGY

## 2023-04-13 PROCEDURE — 99999 PR PBB SHADOW E&M-EST. PATIENT-LVL III: CPT | Mod: PBBFAC,,, | Performed by: PSYCHIATRY & NEUROLOGY

## 2023-04-13 PROCEDURE — 99999 PR PBB SHADOW E&M-EST. PATIENT-LVL III: ICD-10-PCS | Mod: PBBFAC,,, | Performed by: PSYCHIATRY & NEUROLOGY

## 2023-04-13 PROCEDURE — 84425 ASSAY OF VITAMIN B-1: CPT | Performed by: PSYCHIATRY & NEUROLOGY

## 2023-04-13 RX ORDER — GABAPENTIN 300 MG/1
300 CAPSULE ORAL 3 TIMES DAILY
Qty: 30 CAPSULE | Refills: 12 | Status: SHIPPED | OUTPATIENT
Start: 2023-04-13 | End: 2023-08-03

## 2023-04-13 NOTE — ASSESSMENT & PLAN NOTE
Typical fam hx and exam consistent with a debilitating essential tremor.  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

## 2023-04-13 NOTE — PROGRESS NOTES
"Movement Eval    Interval Hx    Since last visit,     B/L VIM DBS    Since last visit  Had COVID  Continues to have major tremor control    She tried several groups C and D- D is best for tremor - no SFX any groups    Mild imbalance No falls  L hand better controlled than R  Likes to craft and paint  Turns off DBS at night    Feels short term memory loss at times    Some insomnia  Continues gabapentin 300mg QAM - mild sleepiness    -------------------    "Hpi:  72 yo woman with hx HTN, ET coming for tremor and DBS eval. She notes hand tremors since age 28's. She notes a b/l hand tremor starting slightly in youth and progressive very slowly. A this point she struggles to do eyemakeup. Struggle to do her hair. Spills food often and she is embarrassed to go out even with weighted silverware. Cannot type. Struggles to text message. Likes to watercolor. In the last year she has a mouth and leg tremor. 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 No falls.   Her speech has changed in the last  6-8mos. Can go to a whisper.  At times when she writes her neck and hand muscles feel tight.    No ETOH sensitivity    Father had hand tremors at age 40's - no issues with gait  Paternal grandmother also with tremors    3 children with termor    MRI brain 2021 NL    Med hx  Has tried propranolol 80mg  Takes primidone 150mg and this  Gabapentin failed"      PD Review of Symptoms:  Anosmia: none  Dysarthria/Hypophonia: some scanning sperech  Dysphagia/Sialorrhea: none  Depression: longstanding  Cognitive slowing: none  Hallucinations: several times saw an insect crawling  Urinary changes: mild incontinence  Constipation: None  Orthostasis: mild  Falls: none  Freezing: none  Micrographia: none  Sleep issues:  -RBD: none      Past Medical History:   Diagnosis Date    Dyslipidemia     HTN (hypertension)     Tremor        PAST SURGICAL HISTORY:  Past Surgical History:   Procedure Laterality Date    DEEP BRAIN " STIMULATOR PLACEMENT Bilateral 2022    Procedure: INSERTION, DEEP BRAIN STIMULATOR BILATERAL VIM;  Surgeon: Neal Hernnádez MD;  Location: [facility] OR 2ND FLR;  Service: Neurosurgery;  Laterality: Bilateral;  BILATERAL (LEFT FIRST, RIGHT SECOND)  INSERTION ELECTRODES FOR DEEP BRAIN STIMULATION IN VENTRALIS INTERMEDIUS/TEDS & SCD/ [facility], ALYSSIA WHITE. CO SURGERY WITH DR JT MITCHELL.    ESOPHAGOGASTRODUODENOSCOPY N/A 2021    Procedure: EGD (ESOPHAGOGASTRODUODENOSCOPY);  Surgeon: Juan R Watson MD;  Location: [facility] ENDO;  Service: Endoscopy;  Laterality: N/A;  covid test  [place], instr portal -ml    HAND SURGERY      HYSTERECTOMY      INSERTION OF DEEP BRAIN STIMULATOR GENERATOR Left 2022    Procedure: INSERTION, PULSE GENERATOR, DEEP BRAIN STIMULATOR;  Surgeon: Neal Hernández MD;  Location: [facility] OR 2ND FLR;  Service: Neurosurgery;  Laterality: Left;  INSERT PULSE GENERATOR FOR DEEP BRAIN STIMULATION/ TEDS & SCD/ [facility], ALYSSIA WHITE.    PLACEMENT OF FIDUCIAL SCREW INTO SPINE N/A 2022    Procedure: INSERTION, FIDUCIAL SCREW, SKULL;  Surgeon: Neal Hernández MD;  Location: [facility] OR 2ND FLR;  Service: Neurosurgery;  Laterality: N/A;  INSERTION FIDUCIAL SCREWS FOR DEEP BRAIN STIMULATION/ TEDS & ACD/ [facility], ALYSSIA WHITE/ CO SURGERY WITH DR JT MITCHELL       SOCIAL HISTORY:  Social History     Socioeconomic History    Marital status: Single   Tobacco Use    Smoking status: Former     Packs/day: 2.00     Years: 30.00     Pack years: 60.00     Types: Cigarettes     Start date: 1968     Quit date: 1999     Years since quittin.2    Smokeless tobacco: Former   Substance and Sexual Activity    Alcohol use: Not Currently    Drug use: Never       FAMILY HISTORY:  Family History   Problem Relation Age of Onset    Alzheimer's disease Mother     Cancer Father        ALLERGIES AND MEDICATIONS: updated and reviewed.  Review of patient's allergies indicates:   Allergen Reactions    Iodine and  "iodide containing products      Current Outpatient Medications   Medication Sig Dispense Refill    albuterol-ipratropium (DUO-NEB) 2.5 mg-0.5 mg/3 mL nebulizer solution Take 3 mLs by nebulization every 6 (six) hours as needed for Wheezing. 100 each 1    amLODIPine (NORVASC) 5 MG tablet Take 5 mg by mouth once daily.      omeprazole (PRILOSEC) 20 MG capsule Take 1 capsule (20 mg total) by mouth once daily. 30 capsule 11    oxybutynin (DITROPAN) 5 MG Tab Take 5 mg by mouth 2 (two) times daily.      sertraline (ZOLOFT) 100 MG tablet Take 100 mg by mouth once daily.      sodium chloride 3% 3 % nebulizer solution 4 cc of 3% saline into nebs twice daily prn 360 mL 3    triamcinolone acetonide 0.1% (KENALOG) 0.1 % ointment Apply topically 2 (two) times daily. 15 g 1    gabapentin (NEURONTIN) 300 MG capsule Take 1 capsule (300 mg total) by mouth 3 (three) times daily. 30 capsule 12     No current facility-administered medications for this visit.       Review of Systems   Constitutional:  Negative for activity change, appetite change, fever and unexpected weight change.   HENT:  Positive for trouble swallowing. Negative for voice change.    Eyes:  Negative for photophobia and visual disturbance.   Respiratory:  Negative for apnea and shortness of breath.    Cardiovascular:  Negative for chest pain and leg swelling.   Gastrointestinal:  Negative for constipation and nausea.   Genitourinary:  Negative for difficulty urinating.   Musculoskeletal:  Negative for back pain, gait problem and neck pain.   Skin:  Negative for color change and pallor.   Neurological:  Positive for tremors. Negative for dizziness, seizures, syncope, weakness and numbness.   Hematological:  Negative for adenopathy.   Psychiatric/Behavioral:  Positive for dysphoric mood. Negative for agitation, confusion and decreased concentration.      PHYSICAL:  /84   Pulse 65   Ht 5' 6" (1.676 m)   Wt 63.5 kg (139 lb 15.9 oz)   BMI 22.60 kg/m²     General " Medical Examination:  General: The patient is alert and cooperative with the exam.   HEENT: Normocephalic, atraumatic.   Neck: Supple.   Chest: Unlabored breathing.   CV: Symmetric pulses.   Ext: No clubbing, cyanosis, or edema.     Mental Status:  General: Good hygiene, appropriate appearance.  Mood/Affect: Appropriate/congruent.  Level of consciousness: Awake, alert.  Orientation: Oriented to person, place, time and situation.  Language: Mild scanning    Cranial nerves:  I: Not tested  II: PERRL, VFF to counting  III, IV, VI: EOMI with conjugate gaze and no nystagmus on end gaze  V: Facial sensation intact and symmetric over the bilateral V1-V3  VII: Facial muscle activation intact and symmetric over the bilateral upper and lower face  VIII: Hearing intact in the b/l ears and symmetrical to finger rub  IX, X, XII: TUP midline  X: SCMs and shoulder shrug full strength b/l and symmetric    Motor:   Strength Intact throughout upper and lower extremities, 5/5.    DTRs:  ? Biceps Triceps Brachioradialis Knee Ankle   Left 3+ 2+ 2+ 3+    Right 3+ 2+ 2+ 3+      No cervical dystonia  At times her voice cuts out to a whisper    ? Finger taps Finger flicks BULL Heel taps   Left 0 0 0 0   Right 0 0 0 0   Neck tone: 0  ? Arm Leg   Left 0 0   Right 0 0     Sensation:   -Light touch: Intact and symmetric in the bilateral upper and lower extremities.    Coordination:   -Finger to nose: nl  -BULL OK    Gait:  NL  -Arm Swing: mild decreased R        Tremor Exam   Arms extended Arms in wing position, fingers almost touching Re-emergent Arms extended wrists extended Intention Resting Kinetic   Left ? ? ? ? ?+ ? ?   Right ? ? ? ? ?+ ? ?   Head tremor: No-No  NEG       Archimedes Spirals   Left nl   Right ?nl       DBS MRI Compatibility INFO  04/13/2023    IPG Model(s) G24653   Serial No. [id number]   Impedance OK       Pocket Adapter  NO      4/13/23 DBS Procedure:    Battery information: status ok.     iNITIAL   Group A - OLD  left  VIM, C positive, 1 negative.  Amplitude 1.3 mA  60/120     Group B - block 0 - ON  left VIM, 0 positive, 1 negative.  Amplitude 1.5 mA  60/120    Group C- High freq   left VIM, 0 positive, 1 negative.  Amplitude 1.5 mA  60/120->160    Group D - segment 2 levels unprotected -ON  left VIM,   1b 1.5mA  2b 0.2mA  Amplitude 1.5 mA  60/120    R VIM same between groups  right VIM, C positive, 9 negative.  Amplitude 1.3 mA  60/120    Programming  Made cope of D to A  Made oval shaped stim field to help tremor  D is ON    FINAL        --------------------------    12/15/22 DBS  Procedure:  Electrode type:  Neurostimulator type: Percept    Battery information: status ok.   Electrode impedance check:  Left hemisphere: No electrodes out of range at 1.0mA.  Right hemisphere: No electrodes out of range at 1.0mA.    Initial -    Group A - OLD  left VIM, C positive, 1 negative.  Amplitude 1.3 mA  60/120     Group B - block 0 - ON  left VIM, 0 positive, 1 negative.  Amplitude 1.5 mA  60/120    Group C- 1b segment mode  1b ON - parasthesias mild - BEST  left VIM,   Amplitude 1.5 mA  60/120    Group D - 2ringmode   left VIM,   C+ 2- ringmode  Amplitude 1.5 mA  60/120    R VIM same between groups  right VIM, C positive, 9 negative.  Amplitude 1.3 mA  60/120    Programming  Copied group B to C and D for better R hand tremor control    Group C- High freq  left VIM, 0 positive, 1 negative.  Amplitude 1.5 mA  60/120->160    Group D - segment 2 levels unprotected  left VIM,   1b 1.5mA  2b 0.2mA  Amplitude 1.5 mA  60/120    R VIM same between groups  right VIM, C positive, 9 negative.  Amplitude 1.3 mA  60/120    FINAL   Group A - OLD  left VIM, C positive, 1 negative.  Amplitude 1.3 mA  60/120     Group B - block 0 - ON  left VIM, 0 positive, 1 negative.  Amplitude 1.5 mA  60/120    Group C- High freq - ON  left VIM, 0 positive, 1 negative.  Amplitude 1.5 mA  60/120->160    Group D - segment 2 levels unprotected  left VIM,   1b 1.5mA  2b  0.2mA  Amplitude 1.5 mA  60/120    R VIM same between groups  right VIM, C positive, 9 negative.  Amplitude 1.3 mA  60/120      --------------------------------------  5/12/22 DBS  Procedure:  Electrode type:  Neurostimulator type: Percept    Battery information: status ok.   Electrode impedance check:  Left hemisphere: No electrodes out of range at 1.0mA.  Right hemisphere: No electrodes out of range at 1.0mA.    Initial settings:  left VIM, C positive, 1 negative.  Amplitude 1.3 mA  60/120    right VIM, C positive, 9 negative.  Amplitude 1.3 mA  60/120    Programming - appears to have scanning speech and intention tremor R>L when stim ON- program to block cerebellar tracts    Turned stim OFF- speech better  B/L hand tremors come back    Copied Group A to B - block 0  left VIM, 0 positive, 1 negative.  Amplitude 1.5 mA  60/120  Postural tremor controlled  Intention tremor remains    Segments  1a ON - parasthesias mild  left VIM,   Amplitude 1.5 mA  60/120  Postural tremor controlled  Intention tremor remains    1b ON - parasthesias mild - BEST  left VIM,   Amplitude 1.5 mA  60/120  Postural tremor controlled  Intention tremor better    1c ON- parasthesias mild  left VIM,   Amplitude 1.5 mA  60/120  Postural tremor controlled  Intention tremor remains  (maybe worse)    Made 1b Group C  1b ON - parasthesias mild - BEST  left VIM,   Amplitude 1.5 mA  60/120  Postural tremor controlled  Intention tremor better    Group D - 2ringmode to get away from cerebellar tracts  left VIM,   C+ 2- ringmode  Amplitude 1.5 mA  60/120  Postural tremor controlled  Intention tremor better    FINAL   Group A - OLD  left VIM, C positive, 1 negative.  Amplitude 1.3 mA  60/120     Group B - block 0  left VIM, 0 positive, 1 negative.  Amplitude 1.5 mA  60/120    Group C- 1b segment mode  1b ON - parasthesias mild - BEST  left VIM,   Amplitude 1.5 mA  60/120    Group D - 2ringmode   left VIM,   C+ 2- ringmode  Amplitude 1.5 mA  60/120    R  "VIM same between groups  right VIM, C positive, 9 negative.  Amplitude 1.3 mA  60/120  _______________________________________  Prior Procedure:  Electrode type:  Neurostimulator type: Percept    Battery information: status ok.   Electrode impedance check:  Left hemisphere: No electrodes out of range at 1.0mA.  Right hemisphere: No electrodes out of range at 1.0mA.    LEFT  Pulse width set at 60; Frequency set at 120  left VIM Monopolar review  C&3 @1.5mA- No paraesthesias   Spirals No better  C&2 @1.5mA- No paraesthesias   Spirals Better  C&1 @1.3mA- Mild fleeting R arm paraesthesias   Spirals better  @1.5mA- Mild fleeting R arm paraesthesias   Spirals same  C&0 @1.0mA- Mild fleeting R arm paraesthesias   Spirals MUCH better (may be best contact but parasthesias stronger)    RIGHT  Pulse width set at 60; Frequency set at 120  right VIM Monopolar review  C&11 @1mA - dizzy feeling  C&10 @1.0mA  C&9 @0.5mA - strong fleeting parasthesias - not help to tremor   @1.0mA - great tremor control   @1.3mA - BEST tremor control  C&8 @0.5mA - strong fleeting parasthesias  @0.7mA good tremor control    Final settings:  left VIM, C positive, 1 negative.  Amplitude 1.3 mA  60/120    right VIM, C positive, 9 negative.  Amplitude 1.3 mA  60/120        Vitals:    04/13/23 0811   BP: 129/84   Pulse: 65   Weight: 63.5 kg (139 lb 15.9 oz)   Height: 5' 6" (1.676 m)       Assessment & Plan:    Problem List Items Addressed This Visit          Neuro    Essential tremor    Overview     Follow by Dr. Foss and Patricia.  Scheduled for DBS           Current Assessment & Plan     Typical fam hx and exam consistent with a debilitating essential tremor.  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 however no PDism on exam     Stopped primidone successfully    S/P B/L VIM DBS  Today programming went well with marked tremor reduction b/l hands    Changed L VIM to avoid cerebellar stim and help R hand tremor  L VIM changes  A - " OvaL SHAPED STIM - NEW  C - high FQ - Old group  D - Pear shaped stim - ON    R VIM no changes               Memory change - Primary    Current Assessment & Plan     Mild memory issues- short term  F/u B1, TSH, B12           Relevant Orders    Vitamin B1    Vitamin B12 Deficiency Panel    TSH         I spent 40 minutes programming        Dayana Foss MD, MS  [facility]  Department of Neurology  Movement Disorders

## 2023-04-14 ENCOUNTER — PATIENT MESSAGE (OUTPATIENT)
Dept: NEUROLOGY | Facility: CLINIC | Age: 73
End: 2023-04-14
Payer: MEDICARE

## 2023-04-14 LAB
T4 FREE SERPL-MCNC: 0.82 NG/DL (ref 0.71–1.51)
VIT B12 SERPL-MCNC: 275 NG/L (ref 180–914)

## 2023-04-14 RX ORDER — CHOLECALCIFEROL (VITAMIN D3) 25 MCG
1 TABLET,CHEWABLE ORAL DAILY
Qty: 30 CAPSULE | Refills: 12 | Status: SHIPPED | OUTPATIENT
Start: 2023-04-14

## 2023-04-18 LAB — METHYLMALONATE SERPL-SCNC: 0.21 NMOL/ML

## 2023-04-19 LAB — VIT B1 BLD-MCNC: 74 UG/L (ref 38–122)

## 2023-04-25 ENCOUNTER — OFFICE VISIT (OUTPATIENT)
Dept: PULMONOLOGY | Facility: CLINIC | Age: 73
End: 2023-04-25
Payer: MEDICARE

## 2023-04-25 VITALS
HEIGHT: 66 IN | SYSTOLIC BLOOD PRESSURE: 130 MMHG | OXYGEN SATURATION: 96 % | BODY MASS INDEX: 22.62 KG/M2 | DIASTOLIC BLOOD PRESSURE: 82 MMHG | WEIGHT: 140.75 LBS

## 2023-04-25 DIAGNOSIS — J84.9 ILD (INTERSTITIAL LUNG DISEASE): ICD-10-CM

## 2023-04-25 DIAGNOSIS — R06.09 DYSPNEA ON EXERTION: ICD-10-CM

## 2023-04-25 DIAGNOSIS — J44.9 CHRONIC OBSTRUCTIVE PULMONARY DISEASE, UNSPECIFIED COPD TYPE: ICD-10-CM

## 2023-04-25 DIAGNOSIS — R13.10 DYSPHAGIA, UNSPECIFIED TYPE: ICD-10-CM

## 2023-04-25 PROCEDURE — 99999 PR PBB SHADOW E&M-EST. PATIENT-LVL III: CPT | Mod: PBBFAC,,, | Performed by: INTERNAL MEDICINE

## 2023-04-25 PROCEDURE — 1159F PR MEDICATION LIST DOCUMENTED IN MEDICAL RECORD: ICD-10-PCS | Mod: CPTII,S$GLB,, | Performed by: INTERNAL MEDICINE

## 2023-04-25 PROCEDURE — 99999 PR PBB SHADOW E&M-EST. PATIENT-LVL III: ICD-10-PCS | Mod: PBBFAC,,, | Performed by: INTERNAL MEDICINE

## 2023-04-25 PROCEDURE — 1101F PR PT FALLS ASSESS DOC 0-1 FALLS W/OUT INJ PAST YR: ICD-10-PCS | Mod: CPTII,S$GLB,, | Performed by: INTERNAL MEDICINE

## 2023-04-25 PROCEDURE — 1126F AMNT PAIN NOTED NONE PRSNT: CPT | Mod: CPTII,S$GLB,, | Performed by: INTERNAL MEDICINE

## 2023-04-25 PROCEDURE — 99214 PR OFFICE/OUTPT VISIT, EST, LEVL IV, 30-39 MIN: ICD-10-PCS | Mod: S$GLB,,, | Performed by: INTERNAL MEDICINE

## 2023-04-25 PROCEDURE — 3288F PR FALLS RISK ASSESSMENT DOCUMENTED: ICD-10-PCS | Mod: CPTII,S$GLB,, | Performed by: INTERNAL MEDICINE

## 2023-04-25 PROCEDURE — 3079F PR MOST RECENT DIASTOLIC BLOOD PRESSURE 80-89 MM HG: ICD-10-PCS | Mod: CPTII,S$GLB,, | Performed by: INTERNAL MEDICINE

## 2023-04-25 PROCEDURE — 99214 OFFICE O/P EST MOD 30 MIN: CPT | Mod: S$GLB,,, | Performed by: INTERNAL MEDICINE

## 2023-04-25 PROCEDURE — 3079F DIAST BP 80-89 MM HG: CPT | Mod: CPTII,S$GLB,, | Performed by: INTERNAL MEDICINE

## 2023-04-25 PROCEDURE — 1101F PT FALLS ASSESS-DOCD LE1/YR: CPT | Mod: CPTII,S$GLB,, | Performed by: INTERNAL MEDICINE

## 2023-04-25 PROCEDURE — 3075F PR MOST RECENT SYSTOLIC BLOOD PRESS GE 130-139MM HG: ICD-10-PCS | Mod: CPTII,S$GLB,, | Performed by: INTERNAL MEDICINE

## 2023-04-25 PROCEDURE — 3008F PR BODY MASS INDEX (BMI) DOCUMENTED: ICD-10-PCS | Mod: CPTII,S$GLB,, | Performed by: INTERNAL MEDICINE

## 2023-04-25 PROCEDURE — 1126F PR PAIN SEVERITY QUANTIFIED, NO PAIN PRESENT: ICD-10-PCS | Mod: CPTII,S$GLB,, | Performed by: INTERNAL MEDICINE

## 2023-04-25 PROCEDURE — 3288F FALL RISK ASSESSMENT DOCD: CPT | Mod: CPTII,S$GLB,, | Performed by: INTERNAL MEDICINE

## 2023-04-25 PROCEDURE — 3008F BODY MASS INDEX DOCD: CPT | Mod: CPTII,S$GLB,, | Performed by: INTERNAL MEDICINE

## 2023-04-25 PROCEDURE — 1159F MED LIST DOCD IN RCRD: CPT | Mod: CPTII,S$GLB,, | Performed by: INTERNAL MEDICINE

## 2023-04-25 PROCEDURE — 3075F SYST BP GE 130 - 139MM HG: CPT | Mod: CPTII,S$GLB,, | Performed by: INTERNAL MEDICINE

## 2023-04-25 RX ORDER — TIOTROPIUM BROMIDE 18 UG/1
18 CAPSULE ORAL; RESPIRATORY (INHALATION) DAILY
Qty: 30 CAPSULE | Refills: 3 | Status: SHIPPED | OUTPATIENT
Start: 2023-04-25 | End: 2023-05-25

## 2023-04-25 NOTE — PROGRESS NOTES
"Elsa Steel  was seen as a follow up.    CHIEF COMPLAINT:  Interstitial Lung Disease      HISTORY OF PRESENT ILLNESS: Elsa Steel is a 72 y.o. female  has a past medical history of Dyslipidemia, HTN (hypertension), and Tremor.  Our first encounter was 3/10/21.  Patient smokes for 30 years up to 2 ppd.  Patient quit smoking in 1999.  Patient with mckeon x 1/2 block for past 2 years.  +daily coughing and wheezing.  Wheezing is worse at night.  Clear to yellowish phlegm.  Intermittent nasal congestion.  Occasional gerd (every 2-3 days). Occasional chest tightness a/w sob.  No fever/chill.  Still work at [employer] in [employer].  +weight gain 25 lbs since 2020.  +solid and liquid food dysphagia.  Once every 2 weeks.      S/p speech and gi evaluation.  S/p egd with small hiatal hernia.      Since our last encounter, s/p DBS for essential tremor by Dr. Hernández.  +improvement in tremor.  Still with intermittent "choking sensation."  +dyspnea after 1/2 block.  S/p CT chest/abd for abd pain.  CT with basilar bronchiectasis and airspace disease. Patient s/p 6 min walk with desat of 88%.  Patient decline oxygen due to high copay.  Breathing improve with airway clearance.  Use to smoke 2 ppd x 20-30 years.  Quit in 1999.   Quit smoke after death of father.       PAST MEDICAL HISTORY:    Active Ambulatory Problems     Diagnosis Date Noted    Dyspnea on exertion 03/10/2021    Dysphagia 03/10/2021    Essential tremor 06/21/2021    Scanning speech 06/21/2021    Depression 09/22/2021    Adjustment disorder with anxious mood 10/15/2021    Chest pain, atypical 12/10/2021    Pulmonary nodules 12/11/2022    HTN (hypertension) 12/11/2022    Abdominal pain 12/11/2022    Calculus of gallbladder with acute cholecystitis without obstruction 12/12/2022    Dysphonia 12/15/2022    ILD (interstitial lung disease) 01/31/2023    Memory change 04/13/2023    COPD (chronic obstructive pulmonary disease) 04/25/2023     Resolved Ambulatory Problems    "  Diagnosis Date Noted    No Resolved Ambulatory Problems     Past Medical History:   Diagnosis Date    Dyslipidemia     Tremor                 PAST SURGICAL HISTORY:    Past Surgical History:   Procedure Laterality Date    DEEP BRAIN STIMULATOR PLACEMENT Bilateral 2022    Procedure: INSERTION, DEEP BRAIN STIMULATOR BILATERAL VIM;  Surgeon: Neal Hernández MD;  Location: [address];  Service: Neurosurgery;  Laterality: Bilateral;  BILATERAL (LEFT FIRST, RIGHT SECOND)  INSERTION ELECTRODES FOR DEEP BRAIN STIMULATION IN [facility]TRALIS INTERMEDIUS/TEDS & SCD/ [facility], ALYSSIA WHITE. CO SURGERY WITH DR JT MITCHELL.    ESOPHAGOGASTRODUODENOSCOPY N/A 2021    Procedure: EGD (ESOPHAGOGASTRODUODENOSCOPY);  Surgeon: Juan R Watson MD;  Location: [facility];  Service: Endoscopy;  Laterality: N/A;  covid test  [place], instr portal -ml    HAND SURGERY      HYSTERECTOMY      INSERTION OF DEEP BRAIN STIMULATOR GENERATOR Left 2022    Procedure: INSERTION, PULSE GENERATOR, DEEP BRAIN STIMULATOR;  Surgeon: Neal Hernández MD;  Location: [address];  Service: Neurosurgery;  Laterality: Left;  INSERT PULSE GENERATOR FOR DEEP BRAIN STIMULATION/ TEDS & SCD/ [facility], ALYSSIA WHITE.    PLACEMENT OF FIDUCIAL SCREW INTO SPINE N/A 2022    Procedure: INSERTION, FIDUCIAL SCREW, SKULL;  Surgeon: Neal Hernández MD;  Location: [address];  Service: Neurosurgery;  Laterality: N/A;  INSERTION FIDUCIAL SCREWS FOR DEEP BRAIN STIMULATION/ TEDS & ACD/ [facility], ALYSSIA WHITE/ CO SURGERY WITH DR JT MITCHELL         FAMILY HISTORY:                Family History   Problem Relation Age of Onset    Alzheimer's disease Mother     Cancer Father        SOCIAL HISTORY:          Tobacco:   Social History     Tobacco Use   Smoking Status Former    Packs/day: 2.00    Years: 30.00    Pack years: 60.00    Types: Cigarettes    Start date: 1968    Quit date: 1999    Years since quittin.3   Smokeless Tobacco Former      alcohol use:    Social History     Substance and Sexual Activity   Alcohol Use Not Currently               Occupation:  [employer].    ALLERGIES:    Review of patient's allergies indicates:   Allergen Reactions    Iodine and iodide containing products        CURRENT MEDICATIONS:    Current Outpatient Medications   Medication Sig Dispense Refill    albuterol-ipratropium (DUO-NEB) 2.5 mg-0.5 mg/3 mL nebulizer solution Take 3 mLs by nebulization every 6 (six) hours as needed for Wheezing. 100 each 1    amLODIPine (NORVASC) 5 MG tablet Take 5 mg by mouth once daily.      cyanocobalamin, vitamin B-12, 1,000 mcg Cap Take 1 tablet by mouth once daily. 30 capsule 12    gabapentin (NEURONTIN) 300 MG capsule Take 1 capsule (300 mg total) by mouth 3 (three) times daily. 30 capsule 12    omeprazole (PRILOSEC) 20 MG capsule Take 1 capsule (20 mg total) by mouth once daily. 30 capsule 11    oxybutynin (DITROPAN) 5 MG Tab Take 5 mg by mouth 2 (two) times daily.      sertraline (ZOLOFT) 100 MG tablet Take 100 mg by mouth once daily.      sodium chloride 3% 3 % nebulizer solution 4 cc of 3% saline into nebs twice daily prn 360 mL 3    triamcinolone acetonide 0.1% (KENALOG) 0.1 % ointment Apply topically 2 (two) times daily. 15 g 1    tiotropium (SPIRIVA WITH HANDIHALER) 18 mcg inhalation capsule Inhale 1 capsule (18 mcg total) into the lungs once daily. 30 capsule 3     No current facility-administered medications for this visit.                  REVIEW OF SYSTEMS:     Pulmonary related symptoms as per HPI.  Gen:  no weight loss, +weight gain 25 lbs since 2020, no fever, no night sweat  HEENT:  no visual changes, no sore throat, no hearing loss  CV:  Per hpi  GI:  no melena, no hematochezia, no diarhea, no constipation.  :  no dysuria, no hematuria, no hesistancy, no dribbling  Neuro:  no syncope, no vertigo, no tinnitus, occasional balance issue a/w driving.  +tremor  Psych:  No homocide or suicide ideation; +  "depression.  Endocrine:  No heat or cold intolerance.  Sleep:  No snoring; no witnessed apnea.  Rested upon awake.    Otherwise, a balance of systems reviewed is negative.          PHYSICAL EXAM:  Vitals:    04/25/23 0809   BP: 130/82   SpO2: 96%   Weight: 63.9 kg (140 lb 12.2 oz)   Height: 5' 6" (1.676 m)   PainSc: 0-No pain     Body mass index is 22.72 kg/m².     GENERAL:  well develop; no apparent distress  HEENT:  no nasal congestion; no discharge noted; class 3 modified mallampatti.   NECK:  supple; no palpable masses.  CARDIO: regular rate and rhythm  PULM:  clear to auscultation bilaterally; no intercostals retractions; no accessory muscle usage   ABDOMEN:  soft nontender/nondistended.  +bowel sound  EXTREMITIES no cce  NEURO:  CN II-XII intact.  5/5 motor in all extremities.  sensation grossly intact   to light touch.  PSYCH:  normal affect.  Alert and oriented x 4    LABS  Pulmonary Functions Testing Results(personally reviewed):    PFT 3/16/21  Ratio of 58%; FVC 2.35 L (79%); FEV1 1.36 L (60%); TLC 3.98 L (75%); dlco 12 (55%)   PFT 2/15/23 Ratio of 67%; FVC 2.36 L (82%); FEV1 1.58 L (72%); TLC 4.67 L (89%); dlco 10 (46%) discrepancy between VA and TLC makes dlco interpretation unreliable.    ABG (personally reviewed):  none  CXR (personally reviewed):  7/21/20 no consolidation or effusion.  Bilateral implants.  Poor inspiratory effort.  ?increase reticulation  CT CHEST(personally reviewed):   12/11/22 basilar bronchiectasis and airspace disease.      Stress echo 4/22/21  The left ventricle is normal in size with normal systolic function.  The estimated ejection fraction is 55%.  Normal left ventricular diastolic function.  Normal right ventricular size with normal right ventricular systolic function.  Mild mitral regurgitation.  Intermediate central venous pressure (8 mmHg).  The estimated PA systolic pressure is 38 mmHg.  Trivial posterior pericardial effusion.  There were no arrhythmias during " stress.  The ECG portion of this study is negative for myocardial ischemia.  The stress echo portion of this study is negative for myocardial ischemia.  Only 79% of maximum predicted heart rate achieved. Consider repeating as a pharmacological nuclear stress test      ASSESSMENT/PLAN  Problem List Items Addressed This Visit       COPD (chronic obstructive pulmonary disease)    Overview     -ratio of 67% wit fev1 72  -responded well to albuterol nebs.  -will start lama and reassess.             Relevant Medications    tiotropium (SPIRIVA WITH HANDIHALER) 18 mcg inhalation capsule    Dysphagia    Overview     -Intermittent solid and liquid dysphagia.  Normal swallow study without evidence of aspiration.  S/p egd with normal esophagus and positive hiatal hernia.   - In the past, patient declined ppi due to lack of symptoms.    -recently started on famotidine (12/22) with improvement in symptoms  -ct with basilar airspace space disease c/w chronic aspiration.    -continue with ppi           Dyspnea on exertion    Overview     - pft with restrictive and obstructive physiology and reduced dlco.  Stress echo without evidence of cad.  PAP 38 mm Hg.    -CT 12/22 with dependant airspace disease and basilar bronchiectasis.   -airway way clearance with nebs and hypertonic saline.    -will repeat pft and 6 min walk.  May need oxygen.           ILD (interstitial lung disease)    Overview     -basilar airspace disease and traction bronchietasis.  No honeycombing.  ?chronic aspiration.   -was on famotidine.  Now on ppi.  -repeat pft in 2/15/23 with improvement in tlc, fvc and fev1 when compared to 3/16/21  -continue with albuterol + hypertonic saline  -will add lama.                  Patient will No follow-ups on file. with md/np.    25 minutes of total time spent on the encounter, which includes face to face time and non-face to face time preparing to see the patient (eg, review of tests), Obtaining and/or reviewing separately  obtained history, documenting clinical information in the electronic or other health record, independently interpreting results (not separately reported) and communicating results to the patient/family/caregiver, or Care coordination (not separately reported).

## 2023-05-10 ENCOUNTER — TELEPHONE (OUTPATIENT)
Dept: NEUROLOGY | Facility: CLINIC | Age: 73
End: 2023-05-10
Payer: MEDICARE

## 2023-05-10 NOTE — TELEPHONE ENCOUNTER
----- Message from Mahogany Bird sent at 5/10/2023 12:55 PM CDT -----  Contact: pt  Pt calling to schedule her DBS appt , please call       Confirmed patient's contact info below:  Contact Name: Elsa Steel  Phone Number: [phone]

## 2023-08-03 ENCOUNTER — OFFICE VISIT (OUTPATIENT)
Dept: NEUROLOGY | Facility: CLINIC | Age: 73
End: 2023-08-03
Payer: MEDICARE

## 2023-08-03 ENCOUNTER — PATIENT MESSAGE (OUTPATIENT)
Dept: NEUROLOGY | Facility: CLINIC | Age: 73
End: 2023-08-03

## 2023-08-03 VITALS
HEIGHT: 66 IN | WEIGHT: 143.06 LBS | BODY MASS INDEX: 22.99 KG/M2 | HEART RATE: 60 BPM | DIASTOLIC BLOOD PRESSURE: 81 MMHG | SYSTOLIC BLOOD PRESSURE: 146 MMHG

## 2023-08-03 DIAGNOSIS — G25.0 ESSENTIAL TREMOR: ICD-10-CM

## 2023-08-03 DIAGNOSIS — H81.10 BENIGN PAROXYSMAL POSITIONAL VERTIGO, UNSPECIFIED LATERALITY: Primary | ICD-10-CM

## 2023-08-03 DIAGNOSIS — R42 VERTIGO: ICD-10-CM

## 2023-08-03 PROCEDURE — 3079F DIAST BP 80-89 MM HG: CPT | Mod: CPTII,S$GLB,, | Performed by: PSYCHIATRY & NEUROLOGY

## 2023-08-03 PROCEDURE — 1100F PR PT FALLS ASSESS DOC 2+ FALLS/FALL W/INJURY/YR: ICD-10-PCS | Mod: CPTII,S$GLB,, | Performed by: PSYCHIATRY & NEUROLOGY

## 2023-08-03 PROCEDURE — 3288F PR FALLS RISK ASSESSMENT DOCUMENTED: ICD-10-PCS | Mod: CPTII,S$GLB,, | Performed by: PSYCHIATRY & NEUROLOGY

## 2023-08-03 PROCEDURE — 99215 PR OFFICE/OUTPT VISIT, EST, LEVL V, 40-54 MIN: ICD-10-PCS | Mod: 25,S$GLB,, | Performed by: PSYCHIATRY & NEUROLOGY

## 2023-08-03 PROCEDURE — 3079F PR MOST RECENT DIASTOLIC BLOOD PRESSURE 80-89 MM HG: ICD-10-PCS | Mod: CPTII,S$GLB,, | Performed by: PSYCHIATRY & NEUROLOGY

## 2023-08-03 PROCEDURE — 95983 ALYS BRN NPGT PRGRMG 15 MIN: CPT | Mod: S$GLB,,, | Performed by: PSYCHIATRY & NEUROLOGY

## 2023-08-03 PROCEDURE — 1126F AMNT PAIN NOTED NONE PRSNT: CPT | Mod: CPTII,S$GLB,, | Performed by: PSYCHIATRY & NEUROLOGY

## 2023-08-03 PROCEDURE — 99215 OFFICE O/P EST HI 40 MIN: CPT | Mod: 25,S$GLB,, | Performed by: PSYCHIATRY & NEUROLOGY

## 2023-08-03 PROCEDURE — 95983 PR ELEC ANALYSIS, IMPLT NEURO PULSE GEN, W/PRGRM, BRAIN, 1ST 15 MINS: ICD-10-PCS | Mod: S$GLB,,, | Performed by: PSYCHIATRY & NEUROLOGY

## 2023-08-03 PROCEDURE — 3077F PR MOST RECENT SYSTOLIC BLOOD PRESSURE >= 140 MM HG: ICD-10-PCS | Mod: CPTII,S$GLB,, | Performed by: PSYCHIATRY & NEUROLOGY

## 2023-08-03 PROCEDURE — 3077F SYST BP >= 140 MM HG: CPT | Mod: CPTII,S$GLB,, | Performed by: PSYCHIATRY & NEUROLOGY

## 2023-08-03 PROCEDURE — 99999 PR PBB SHADOW E&M-EST. PATIENT-LVL III: CPT | Mod: PBBFAC,,, | Performed by: PSYCHIATRY & NEUROLOGY

## 2023-08-03 PROCEDURE — 1100F PTFALLS ASSESS-DOCD GE2>/YR: CPT | Mod: CPTII,S$GLB,, | Performed by: PSYCHIATRY & NEUROLOGY

## 2023-08-03 PROCEDURE — 95984 ALYS BRN NPGT PRGRMG ADDL 15: CPT | Mod: S$GLB,,, | Performed by: PSYCHIATRY & NEUROLOGY

## 2023-08-03 PROCEDURE — 3288F FALL RISK ASSESSMENT DOCD: CPT | Mod: CPTII,S$GLB,, | Performed by: PSYCHIATRY & NEUROLOGY

## 2023-08-03 PROCEDURE — 1126F PR PAIN SEVERITY QUANTIFIED, NO PAIN PRESENT: ICD-10-PCS | Mod: CPTII,S$GLB,, | Performed by: PSYCHIATRY & NEUROLOGY

## 2023-08-03 PROCEDURE — 99999 PR PBB SHADOW E&M-EST. PATIENT-LVL III: ICD-10-PCS | Mod: PBBFAC,,, | Performed by: PSYCHIATRY & NEUROLOGY

## 2023-08-03 PROCEDURE — 1159F MED LIST DOCD IN RCRD: CPT | Mod: CPTII,S$GLB,, | Performed by: PSYCHIATRY & NEUROLOGY

## 2023-08-03 PROCEDURE — 1159F PR MEDICATION LIST DOCUMENTED IN MEDICAL RECORD: ICD-10-PCS | Mod: CPTII,S$GLB,, | Performed by: PSYCHIATRY & NEUROLOGY

## 2023-08-03 PROCEDURE — 95984 PR ELEC ANALYSIS, IMPLT NEURO PULSE GEN, W/PRGRM, BRAIN,  EA ADDTL 15 MINS: ICD-10-PCS | Mod: S$GLB,,, | Performed by: PSYCHIATRY & NEUROLOGY

## 2023-08-03 NOTE — ASSESSMENT & PLAN NOTE
Typical fam hx and exam consistent with a debilitating essential tremor.  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 however no PDism on exam     S/P B/L VIM DBS  Today programming went well with marked tremor reduction b/l hands    DBS changes  A and B new to help R hand cerebellar tremor  C and D are old

## 2023-08-03 NOTE — PROGRESS NOTES
"Movement Eval    Interval Hx    Since last visit,   B/L VIM DBS    Since last visit  Intermittent vertigo  Had COVID  Continues to have major tremor control    She tried several groups C and D- D is best for tremor - no SFX any groups    One fall  L hand better controlled than R  Likes to craft and paint  Turns off DBS at night  Feels short term memory loss at times    Some insomnia  Continues gabapentin 300mg QAM - mild sleepiness    -------------------    "Hpi:  70 yo woman with hx HTN, ET coming for tremor and DBS eval. She notes hand tremors since age 28's. She notes a b/l hand tremor starting slightly in youth and progressive very slowly. A this point she struggles to do eyemakeup. Struggle to do her hair. Spills food often and she is embarrassed to go out even with weighted silverware. Cannot type. Struggles to text message. Likes to watercolor. In the last year she has a mouth and leg tremor. 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 No falls.   Her speech has changed in the last  6-8mos. Can go to a whisper.  At times when she writes her neck and hand muscles feel tight.    No ETOH sensitivity    Father had hand tremors at age 40's - no issues with gait  Paternal grandmother also with tremors    3 children with termor    MRI brain 2021 NL    Med hx  Has tried propranolol 80mg  Takes primidone 150mg and this  Gabapentin failed"      PD Review of Symptoms:  Anosmia: none  Dysarthria/Hypophonia: some scanning sperech  Dysphagia/Sialorrhea: none  Depression: longstanding  Cognitive slowing: none  Hallucinations: several times saw an insect crawling  Urinary changes: mild incontinence  Constipation: None  Orthostasis: mild  Falls: none  Freezing: none  Micrographia: none  Sleep issues:  -RBD: none      Past Medical History:   Diagnosis Date    Dyslipidemia     HTN (hypertension)     Tremor        PAST SURGICAL HISTORY:  Past Surgical History:   Procedure Laterality Date    DEEP " BRAIN STIMULATOR PLACEMENT Bilateral 2022    Procedure: INSERTION, DEEP BRAIN STIMULATOR BILATERAL VIM;  Surgeon: Neal Hernández MD;  Location: [facility] OR 2ND FLR;  Service: Neurosurgery;  Laterality: Bilateral;  BILATERAL (LEFT FIRST, RIGHT SECOND)  INSERTION ELECTRODES FOR DEEP BRAIN STIMULATION IN VENTRALIS INTERMEDIUS/TEDS & SCD/ [facility], ALYSSIA WHITE. CO SURGERY WITH DR JT MITCHELL.    ESOPHAGOGASTRODUODENOSCOPY N/A 2021    Procedure: EGD (ESOPHAGOGASTRODUODENOSCOPY);  Surgeon: Juan R Watson MD;  Location: [facility] ENDO;  Service: Endoscopy;  Laterality: N/A;  covid test  [place], instr portal -ml    HAND SURGERY      HYSTERECTOMY      INSERTION OF DEEP BRAIN STIMULATOR GENERATOR Left 2022    Procedure: INSERTION, PULSE GENERATOR, DEEP BRAIN STIMULATOR;  Surgeon: Neal eHrnández MD;  Location: [facility] OR 2ND FLR;  Service: Neurosurgery;  Laterality: Left;  INSERT PULSE GENERATOR FOR DEEP BRAIN STIMULATION/ TEDS & SCD/ [facility]TR[facility]ICS, ALYSSIA WHITE.    PLACEMENT OF FIDUCIAL SCREW INTO SPINE N/A 2022    Procedure: INSERTION, FIDUCIAL SCREW, SKULL;  Surgeon: Neal Hernández MD;  Location: [facility] OR 2ND FLR;  Service: Neurosurgery;  Laterality: N/A;  INSERTION FIDUCIAL SCREWS FOR DEEP BRAIN STIMULATION/ TEDS & ACD/ [facility]TRONICS, ALYSSIA WHITE/ CO SURGERY WITH DR JT MITCHELL       SOCIAL HISTORY:  Social History     Socioeconomic History    Marital status: Single   Tobacco Use    Smoking status: Former     Current packs/day: 0.00     Average packs/day: 2.0 packs/day for 31.0 years (62.0 ttl pk-yrs)     Types: Cigarettes     Start date: 1968     Quit date: 1999     Years since quittin.6    Smokeless tobacco: Former   Substance and Sexual Activity    Alcohol use: Not Currently    Drug use: Never       FAMILY HISTORY:  Family History   Problem Relation Age of Onset    Alzheimer's disease Mother     Cancer Father        ALLERGIES AND MEDICATIONS: updated and reviewed.  Review of patient's allergies  indicates:   Allergen Reactions    Iodine and iodide containing products      Current Outpatient Medications   Medication Sig Dispense Refill    albuterol-ipratropium (DUO-NEB) 2.5 mg-0.5 mg/3 mL nebulizer solution Take 3 mLs by nebulization every 6 (six) hours as needed for Wheezing. 100 each 1    amLODIPine (NORVASC) 5 MG tablet Take 5 mg by mouth once daily.      cyanocobalamin, vitamin B-12, 1,000 mcg Cap Take 1 tablet by mouth once daily. 30 capsule 12    omeprazole (PRILOSEC) 20 MG capsule Take 1 capsule (20 mg total) by mouth once daily. 30 capsule 11    oxybutynin (DITROPAN) 5 MG Tab Take 5 mg by mouth 2 (two) times daily.      sertraline (ZOLOFT) 100 MG tablet Take 100 mg by mouth once daily.      sodium chloride 3% 3 % nebulizer solution 4 cc of 3% saline into nebs twice daily prn 360 mL 3    triamcinolone acetonide 0.1% (KENALOG) 0.1 % ointment Apply topically 2 (two) times daily. 15 g 1    tiotropium (SPIRIVA WITH HANDIHALER) 18 mcg inhalation capsule Inhale 1 capsule (18 mcg total) into the lungs once daily. 30 capsule 3     No current facility-administered medications for this visit.       Review of Systems   Constitutional:  Negative for activity change, appetite change, fever and unexpected weight change.   HENT:  Positive for trouble swallowing. Negative for voice change.    Eyes:  Negative for photophobia and visual disturbance.   Respiratory:  Negative for apnea and shortness of breath.    Cardiovascular:  Negative for chest pain and leg swelling.   Gastrointestinal:  Negative for constipation and nausea.   Genitourinary:  Negative for difficulty urinating.   Musculoskeletal:  Negative for back pain, gait problem and neck pain.   Skin:  Negative for color change and pallor.   Neurological:  Positive for tremors. Negative for dizziness, seizures, syncope, weakness and numbness.   Hematological:  Negative for adenopathy.   Psychiatric/Behavioral:  Positive for dysphoric mood. Negative for agitation,  "confusion and decreased concentration.        PHYSICAL:  BP (!) 146/81   Pulse 60   Ht 5' 6" (1.676 m)   Wt 64.9 kg (143 lb 1.3 oz)   BMI 23.09 kg/m²     General Medical Examination:  General: The patient is alert and cooperative with the exam.   HEENT: Normocephalic, atraumatic.   Neck: Supple.   Chest: Unlabored breathing.   CV: Symmetric pulses.   Ext: No clubbing, cyanosis, or edema.     Mental Status:  General: Good hygiene, appropriate appearance.  Mood/Affect: Appropriate/congruent.  Level of consciousness: Awake, alert.  Orientation: Oriented to person, place, time and situation.  Language: Mild scanning    Cranial nerves:  I: Not tested  II: PERRL, VFF to counting  III, IV, VI: EOMI with conjugate gaze and no nystagmus on end gaze  V: Facial sensation intact and symmetric over the bilateral V1-V3  VII: Facial muscle activation intact and symmetric over the bilateral upper and lower face  VIII: Hearing intact in the b/l ears and symmetrical to finger rub  IX, X, XII: TUP midline  X: SCMs and shoulder shrug full strength b/l and symmetric    Motor:   Strength Intact throughout upper and lower extremities, 5/5.    DTRs:  ? Biceps Triceps Brachioradialis Knee Ankle   Left 3+ 2+ 2+ 3+    Right 3+ 2+ 2+ 3+      No cervical dystonia  At times her voice cuts out to a whisper    ? Finger taps Finger flicks BULL Heel taps   Left 0 0 0 0   Right 0 0 0 0   Neck tone: 0  ? Arm Leg   Left 0 0   Right 0 0     Sensation:   -Light touch: Intact and symmetric in the bilateral upper and lower extremities.    Coordination:   -Finger to nose: nl  -BULL OK    Gait:  NL  -Arm Swing: mild decreased R        Tremor Exam   Arms extended Arms in wing position, fingers almost touching Re-emergent Arms extended wrists extended Intention Resting Kinetic   Left ? ? ? ? ?+ ? ?   Right ? ? ? ? ?+ ? ?   Head tremor: No-No  NEG       Archimedes Spirals   Left nl   Right ?nl     DBS MRI Compatibility INFO  08/03/2023    IPG Model(s) " I01022   Serial No. [id number]   Impedance OK   Battery 9 years   Pocket Adapter  NO      Initial      Programming changes to help R hand cerebellar tremor  FINAL  A and B new to help R hand cerebellar tremor  C and D are old      -----------------------------------    4/13/23 DBS Procedure:    Battery information: status ok.     iNITIAL   Group A - OLD  left VIM, C positive, 1 negative.  Amplitude 1.3 mA  60/120     Group B - block 0 - ON  left VIM, 0 positive, 1 negative.  Amplitude 1.5 mA  60/120    Group C- High freq   left VIM, 0 positive, 1 negative.  Amplitude 1.5 mA  60/120->160    Group D - segment 2 levels unprotected -ON  left VIM,   1b 1.5mA  2b 0.2mA  Amplitude 1.5 mA  60/120    R VIM same between groups  right VIM, C positive, 9 negative.  Amplitude 1.3 mA  60/120    Programming  Made copy of D to A  Made oval shaped stim field to help tremor  D is ON    FINAL        --------------------------    12/15/22 DBS  Procedure:  Electrode type:  Neurostimulator type: Percept    Battery information: status ok.   Electrode impedance check:  Left hemisphere: No electrodes out of range at 1.0mA.  Right hemisphere: No electrodes out of range at 1.0mA.    Initial -    Group A - OLD  left VIM, C positive, 1 negative.  Amplitude 1.3 mA  60/120     Group B - block 0 - ON  left VIM, 0 positive, 1 negative.  Amplitude 1.5 mA  60/120    Group C- 1b segment mode  1b ON - parasthesias mild - BEST  left VIM,   Amplitude 1.5 mA  60/120    Group D - 2ringmode   left VIM,   C+ 2- ringmode  Amplitude 1.5 mA  60/120    R VIM same between groups  right VIM, C positive, 9 negative.  Amplitude 1.3 mA  60/120    Programming  Copied group B to C and D for better R hand tremor control    Group C- High freq  left VIM, 0 positive, 1 negative.  Amplitude 1.5 mA  60/120->160    Group D - segment 2 levels unprotected  left VIM,   1b 1.5mA  2b 0.2mA  Amplitude 1.5 mA  60/120    R VIM same between groups  right VIM, C positive, 9  negative.  Amplitude 1.3 mA  60/120    FINAL   Group A - OLD  left VIM, C positive, 1 negative.  Amplitude 1.3 mA  60/120     Group B - block 0 - ON  left VIM, 0 positive, 1 negative.  Amplitude 1.5 mA  60/120    Group C- High freq - ON  left VIM, 0 positive, 1 negative.  Amplitude 1.5 mA  60/120->160    Group D - segment 2 levels unprotected  left VIM,   1b 1.5mA  2b 0.2mA  Amplitude 1.5 mA  60/120    R VIM same between groups  right VIM, C positive, 9 negative.  Amplitude 1.3 mA  60/120      --------------------------------------  5/12/22 DBS  Procedure:  Electrode type:  Neurostimulator type: Percept    Battery information: status ok.   Electrode impedance check:  Left hemisphere: No electrodes out of range at 1.0mA.  Right hemisphere: No electrodes out of range at 1.0mA.    Initial settings:  left VIM, C positive, 1 negative.  Amplitude 1.3 mA  60/120    right VIM, C positive, 9 negative.  Amplitude 1.3 mA  60/120    Programming - appears to have scanning speech and intention tremor R>L when stim ON- program to block cerebellar tracts    Turned stim OFF- speech better  B/L hand tremors come back    Copied Group A to B - block 0  left VIM, 0 positive, 1 negative.  Amplitude 1.5 mA  60/120  Postural tremor controlled  Intention tremor remains    Segments  1a ON - parasthesias mild  left VIM,   Amplitude 1.5 mA  60/120  Postural tremor controlled  Intention tremor remains    1b ON - parasthesias mild - BEST  left VIM,   Amplitude 1.5 mA  60/120  Postural tremor controlled  Intention tremor better    1c ON- parasthesias mild  left VIM,   Amplitude 1.5 mA  60/120  Postural tremor controlled  Intention tremor remains  (maybe worse)    Made 1b Group C  1b ON - parasthesias mild - BEST  left VIM,   Amplitude 1.5 mA  60/120  Postural tremor controlled  Intention tremor better    Group D - 2ringmode to get away from cerebellar tracts  left VIM,   C+ 2- ringmode  Amplitude 1.5 mA  60/120  Postural tremor  "controlled  Intention tremor better    FINAL   Group A - OLD  left VIM, C positive, 1 negative.  Amplitude 1.3 mA  60/120     Group B - block 0  left VIM, 0 positive, 1 negative.  Amplitude 1.5 mA  60/120    Group C- 1b segment mode  1b ON - parasthesias mild - BEST  left VIM,   Amplitude 1.5 mA  60/120    Group D - 2ringmode   left VIM,   C+ 2- ringmode  Amplitude 1.5 mA  60/120    R VIM same between groups  right VIM, C positive, 9 negative.  Amplitude 1.3 mA  60/120  _______________________________________  Prior Procedure:  Electrode type:  Neurostimulator type: Percept    Battery information: status ok.   Electrode impedance check:  Left hemisphere: No electrodes out of range at 1.0mA.  Right hemisphere: No electrodes out of range at 1.0mA.    LEFT  Pulse width set at 60; Frequency set at 120  left VIM Monopolar review  C&3 @1.5mA- No paraesthesias   Spirals No better  C&2 @1.5mA- No paraesthesias   Spirals Better  C&1 @1.3mA- Mild fleeting R arm paraesthesias   Spirals better  @1.5mA- Mild fleeting R arm paraesthesias   Spirals same  C&0 @1.0mA- Mild fleeting R arm paraesthesias   Spirals MUCH better (may be best contact but parasthesias stronger)    RIGHT  Pulse width set at 60; Frequency set at 120  right VIM Monopolar review  C&11 @1mA - dizzy feeling  C&10 @1.0mA  C&9 @0.5mA - strong fleeting parasthesias - not help to tremor   @1.0mA - great tremor control   @1.3mA - BEST tremor control  C&8 @0.5mA - strong fleeting parasthesias  @0.7mA good tremor control    Final settings:  left VIM, C positive, 1 negative.  Amplitude 1.3 mA  60/120    right VIM, C positive, 9 negative.  Amplitude 1.3 mA  60/120        Vitals:    08/03/23 0816   BP: (!) 146/81   Pulse: 60   Weight: 64.9 kg (143 lb 1.3 oz)   Height: 5' 6" (1.676 m)       Assessment & Plan:    Problem List Items Addressed This Visit          Neuro    Essential tremor    Overview     Follow by Dr. Foss and Patricia.  Scheduled for DBS         Current " Assessment & Plan     Typical fam hx and exam consistent with a debilitating essential tremor.  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 however no PDism on exam     S/P B/L VIM DBS  Today programming went well with marked tremor reduction b/l hands    DBS changes  A and B new to help R hand cerebellar tremor  C and D are old                  ENT    Vertigo    Current Assessment & Plan     Intermittent vertigo when she turns her head  Suggested BPPV          Other Visit Diagnoses       Benign paroxysmal positional vertigo, unspecified laterality    -  Primary    Relevant Orders    Ambulatory referral/consult to Physical/Occupational Therapy            I spent 40 minutes programming  25 minutes spent discussing BPPV    Dayana Foss MD, MS  [facility]  Department of Neurology  Movement Disorders

## 2023-09-11 ENCOUNTER — TELEPHONE (OUTPATIENT)
Dept: NEUROLOGY | Facility: CLINIC | Age: 73
End: 2023-09-11
Payer: MEDICARE

## 2023-09-11 NOTE — TELEPHONE ENCOUNTER
----- Message from Melyssa Callahan sent at 9/11/2023 12:47 PM CDT -----  Regarding: Appt Access  Contact: pt [phone]  Pt calling to schedule appt in December for 8am. Pls call

## 2023-09-18 NOTE — TELEPHONE ENCOUNTER
Called pt to schedule DBS appt. We scheduled for end of December per recall Thursday 12/28/23 at 8 AM. Will send appt reminder in mail.

## 2023-11-13 DIAGNOSIS — Z12.31 BREAST CANCER SCREENING BY MAMMOGRAM: Primary | ICD-10-CM

## 2023-11-17 ENCOUNTER — HOSPITAL ENCOUNTER (OUTPATIENT)
Dept: RADIOLOGY | Facility: HOSPITAL | Age: 73
Discharge: HOME | End: 2023-11-17
Attending: INTERNAL MEDICINE
Payer: MEDICARE

## 2023-11-17 DIAGNOSIS — Z12.31 BREAST CANCER SCREENING BY MAMMOGRAM: ICD-10-CM

## 2023-11-17 PROCEDURE — 77063 BREAST TOMOSYNTHESIS BI: CPT | Mod: 26,,, | Performed by: RADIOLOGY

## 2023-11-17 PROCEDURE — 77063 MAMMO DIGITAL SCREENING BILAT WITH TOMO: ICD-10-PCS | Mod: 26,,, | Performed by: RADIOLOGY

## 2023-11-17 PROCEDURE — 77067 MAMMO DIGITAL SCREENING BILAT WITH TOMO: ICD-10-PCS | Mod: 26,,, | Performed by: RADIOLOGY

## 2023-11-17 PROCEDURE — 77067 SCR MAMMO BI INCL CAD: CPT | Mod: 26,,, | Performed by: RADIOLOGY

## 2023-11-17 PROCEDURE — 77067 SCR MAMMO BI INCL CAD: CPT | Mod: TC

## 2023-12-28 ENCOUNTER — OFFICE VISIT (OUTPATIENT)
Dept: NEUROLOGY | Facility: CLINIC | Age: 73
End: 2023-12-28
Payer: MEDICARE

## 2023-12-28 VITALS
HEART RATE: 69 BPM | SYSTOLIC BLOOD PRESSURE: 136 MMHG | HEIGHT: 66 IN | BODY MASS INDEX: 22.94 KG/M2 | WEIGHT: 142.75 LBS | DIASTOLIC BLOOD PRESSURE: 76 MMHG

## 2023-12-28 DIAGNOSIS — G25.0 ESSENTIAL TREMOR: ICD-10-CM

## 2023-12-28 DIAGNOSIS — G47.00 INSOMNIA, UNSPECIFIED TYPE: Primary | ICD-10-CM

## 2023-12-28 PROCEDURE — 99213 PR OFFICE/OUTPT VISIT, EST, LEVL III, 20-29 MIN: ICD-10-PCS | Mod: 25,S$GLB,, | Performed by: PSYCHIATRY & NEUROLOGY

## 2023-12-28 PROCEDURE — 3075F SYST BP GE 130 - 139MM HG: CPT | Mod: CPTII,S$GLB,, | Performed by: PSYCHIATRY & NEUROLOGY

## 2023-12-28 PROCEDURE — 95984 ALYS BRN NPGT PRGRMG ADDL 15: CPT | Mod: S$GLB,,, | Performed by: PSYCHIATRY & NEUROLOGY

## 2023-12-28 PROCEDURE — 1159F PR MEDICATION LIST DOCUMENTED IN MEDICAL RECORD: ICD-10-PCS | Mod: CPTII,S$GLB,, | Performed by: PSYCHIATRY & NEUROLOGY

## 2023-12-28 PROCEDURE — 3078F PR MOST RECENT DIASTOLIC BLOOD PRESSURE < 80 MM HG: ICD-10-PCS | Mod: CPTII,S$GLB,, | Performed by: PSYCHIATRY & NEUROLOGY

## 2023-12-28 PROCEDURE — 3078F DIAST BP <80 MM HG: CPT | Mod: CPTII,S$GLB,, | Performed by: PSYCHIATRY & NEUROLOGY

## 2023-12-28 PROCEDURE — 1101F PT FALLS ASSESS-DOCD LE1/YR: CPT | Mod: CPTII,S$GLB,, | Performed by: PSYCHIATRY & NEUROLOGY

## 2023-12-28 PROCEDURE — 3288F PR FALLS RISK ASSESSMENT DOCUMENTED: ICD-10-PCS | Mod: CPTII,S$GLB,, | Performed by: PSYCHIATRY & NEUROLOGY

## 2023-12-28 PROCEDURE — 99213 OFFICE O/P EST LOW 20 MIN: CPT | Mod: 25,S$GLB,, | Performed by: PSYCHIATRY & NEUROLOGY

## 2023-12-28 PROCEDURE — 3288F FALL RISK ASSESSMENT DOCD: CPT | Mod: CPTII,S$GLB,, | Performed by: PSYCHIATRY & NEUROLOGY

## 2023-12-28 PROCEDURE — 95983 ALYS BRN NPGT PRGRMG 15 MIN: CPT | Mod: S$GLB,,, | Performed by: PSYCHIATRY & NEUROLOGY

## 2023-12-28 PROCEDURE — 95983 PR ELEC ANALYSIS, IMPLT NEURO PULSE GEN, W/PRGRM, BRAIN, 1ST 15 MINS: ICD-10-PCS | Mod: S$GLB,,, | Performed by: PSYCHIATRY & NEUROLOGY

## 2023-12-28 PROCEDURE — 1159F MED LIST DOCD IN RCRD: CPT | Mod: CPTII,S$GLB,, | Performed by: PSYCHIATRY & NEUROLOGY

## 2023-12-28 PROCEDURE — 99999 PR PBB SHADOW E&M-EST. PATIENT-LVL III: CPT | Mod: PBBFAC,,, | Performed by: PSYCHIATRY & NEUROLOGY

## 2023-12-28 PROCEDURE — 1126F PR PAIN SEVERITY QUANTIFIED, NO PAIN PRESENT: ICD-10-PCS | Mod: CPTII,S$GLB,, | Performed by: PSYCHIATRY & NEUROLOGY

## 2023-12-28 PROCEDURE — 1101F PR PT FALLS ASSESS DOC 0-1 FALLS W/OUT INJ PAST YR: ICD-10-PCS | Mod: CPTII,S$GLB,, | Performed by: PSYCHIATRY & NEUROLOGY

## 2023-12-28 PROCEDURE — 3075F PR MOST RECENT SYSTOLIC BLOOD PRESS GE 130-139MM HG: ICD-10-PCS | Mod: CPTII,S$GLB,, | Performed by: PSYCHIATRY & NEUROLOGY

## 2023-12-28 PROCEDURE — 99999 PR PBB SHADOW E&M-EST. PATIENT-LVL III: ICD-10-PCS | Mod: PBBFAC,,, | Performed by: PSYCHIATRY & NEUROLOGY

## 2023-12-28 PROCEDURE — 1126F AMNT PAIN NOTED NONE PRSNT: CPT | Mod: CPTII,S$GLB,, | Performed by: PSYCHIATRY & NEUROLOGY

## 2023-12-28 PROCEDURE — 95984 PR ELEC ANALYSIS, IMPLT NEURO PULSE GEN, W/PRGRM, BRAIN,  EA ADDTL 15 MINS: ICD-10-PCS | Mod: S$GLB,,, | Performed by: PSYCHIATRY & NEUROLOGY

## 2023-12-28 RX ORDER — MIRTAZAPINE 7.5 MG/1
7.5 TABLET, FILM COATED ORAL NIGHTLY
Qty: 30 TABLET | Refills: 11 | Status: SHIPPED | OUTPATIENT
Start: 2023-12-28 | End: 2024-12-27

## 2023-12-28 RX ORDER — GABAPENTIN 300 MG/1
1 CAPSULE ORAL 2 TIMES DAILY
COMMUNITY

## 2023-12-28 NOTE — ASSESSMENT & PLAN NOTE
Typical fam hx and exam consistent with a debilitating essential tremor.  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

## 2023-12-28 NOTE — PROGRESS NOTES
"Movement Eval    Interval Hx    Since last visit,   B/L VIM DBS    Since last visit  Continues to have major tremor control  Group C is best  No imbalance, no falls    Sleeping issues and at times mood swings  Likes to craft and paint  Turns off DBS at night  Feels short term memory loss at times    Some insomnia  Continues gabapentin 300mg QAM - mild sleepiness    -------------------    "Hpi:  70 yo woman with hx HTN, ET coming for tremor and DBS eval. She notes hand tremors since age 28's. She notes a b/l hand tremor starting slightly in youth and progressive very slowly. A this point she struggles to do eyemakeup. Struggle to do her hair. Spills food often and she is embarrassed to go out even with weighted silverware. Cannot type. Struggles to text message. Likes to watercolor. In the last year she has a mouth and leg tremor. 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 No falls.   Her speech has changed in the last  6-8mos. Can go to a whisper.  At times when she writes her neck and hand muscles feel tight.    No ETOH sensitivity    Father had hand tremors at age 40's - no issues with gait  Paternal grandmother also with tremors    3 children with termor    MRI brain 2021 NL    Med hx  Has tried propranolol 80mg  Takes primidone 150mg and this  Gabapentin failed"      PD Review of Symptoms:  Anosmia: none  Dysarthria/Hypophonia: some scanning sperech  Dysphagia/Sialorrhea: none  Depression: longstanding  Cognitive slowing: none  Hallucinations: several times saw an insect crawling  Urinary changes: mild incontinence  Constipation: None  Orthostasis: mild  Falls: none  Freezing: none  Micrographia: none  Sleep issues:  -RBD: none      Past Medical History:   Diagnosis Date    Dyslipidemia     HTN (hypertension)     Tremor        PAST SURGICAL HISTORY:  Past Surgical History:   Procedure Laterality Date    AUGMENTATION OF BREAST Bilateral     DEEP BRAIN STIMULATOR PLACEMENT Bilateral " 2022    Procedure: INSERTION, DEEP BRAIN STIMULATOR BILATERAL VIM;  Surgeon: Neal Hernández MD;  Location: [facility] OR 2ND FLR;  Service: Neurosurgery;  Laterality: Bilateral;  BILATERAL (LEFT FIRST, RIGHT SECOND)  INSERTION ELECTRODES FOR DEEP BRAIN STIMULATION IN VENTRALIS INTERMEDIUS/TEDS & SCD/ [facility], ALYSSIA WHITE. CO SURGERY WITH DR JT MITCHELL.    ESOPHAGOGASTRODUODENOSCOPY N/A 2021    Procedure: EGD (ESOPHAGOGASTRODUODENOSCOPY);  Surgeon: Juan R Watson MD;  Location: [facility] ENDO;  Service: Endoscopy;  Laterality: N/A;  covid test  [place], instr portal -ml    HAND SURGERY      HYSTERECTOMY      INSERTION OF DEEP BRAIN STIMULATOR GENERATOR Left 2022    Procedure: INSERTION, PULSE GENERATOR, DEEP BRAIN STIMULATOR;  Surgeon: Neal Hernández MD;  Location: [facility] OR 2ND FLR;  Service: Neurosurgery;  Laterality: Left;  INSERT PULSE GENERATOR FOR DEEP BRAIN STIMULATION/ TEDS & SCD/ [facility]TRONICS, ALYSSIA WHITE.    PLACEMENT OF FIDUCIAL SCREW INTO SPINE N/A 2022    Procedure: INSERTION, FIDUCIAL SCREW, SKULL;  Surgeon: Neal Hernández MD;  Location: [facility] OR 2ND FLR;  Service: Neurosurgery;  Laterality: N/A;  INSERTION FIDUCIAL SCREWS FOR DEEP BRAIN STIMULATION/ TEDS & ACD/ MEDTRONICS, ALYSSIA WHITE/ CO SURGERY WITH DR JT MITCHELL       SOCIAL HISTORY:  Social History     Socioeconomic History    Marital status: Single   Tobacco Use    Smoking status: Former     Current packs/day: 0.00     Average packs/day: 2.0 packs/day for 31.0 years (62.0 ttl pk-yrs)     Types: Cigarettes     Start date: 1968     Quit date: 1999     Years since quittin.0    Smokeless tobacco: Former   Substance and Sexual Activity    Alcohol use: Not Currently    Drug use: Never       FAMILY HISTORY:  Family History   Problem Relation Age of Onset    Alzheimer's disease Mother     Cancer Father        ALLERGIES AND MEDICATIONS: updated and reviewed.  Review of patient's allergies indicates:   Allergen Reactions     Iodine and iodide containing products     Iodine Nausea And Vomiting     Current Outpatient Medications   Medication Sig Dispense Refill    albuterol-ipratropium (DUO-NEB) 2.5 mg-0.5 mg/3 mL nebulizer solution Take 3 mLs by nebulization every 6 (six) hours as needed for Wheezing. 100 each 1    amLODIPine (NORVASC) 5 MG tablet Take 5 mg by mouth once daily.      cyanocobalamin, vitamin B-12, 1,000 mcg Cap Take 1 tablet by mouth once daily. 30 capsule 12    gabapentin (NEURONTIN) 300 MG capsule Take 1 capsule by mouth 2 (two) times daily.      oxybutynin (DITROPAN) 5 MG Tab Take 5 mg by mouth 2 (two) times daily.      triamcinolone acetonide 0.1% (KENALOG) 0.1 % ointment Apply topically 2 (two) times daily. 15 g 1    mirtazapine (REMERON) 7.5 MG Tab Take 1 tablet (7.5 mg total) by mouth every evening. 30 tablet 11    omeprazole (PRILOSEC) 20 MG capsule Take 1 capsule (20 mg total) by mouth once daily. (Patient not taking: Reported on 12/28/2023) 30 capsule 11    sertraline (ZOLOFT) 100 MG tablet Take 100 mg by mouth once daily.      sodium chloride 3% 3 % nebulizer solution 4 cc of 3% saline into nebs twice daily prn (Patient not taking: Reported on 12/28/2023) 360 mL 3    tiotropium (SPIRIVA WITH HANDIHALER) 18 mcg inhalation capsule Inhale 1 capsule (18 mcg total) into the lungs once daily. 30 capsule 3     No current facility-administered medications for this visit.       Review of Systems   Constitutional:  Negative for activity change, appetite change, fever and unexpected weight change.   HENT:  Positive for trouble swallowing. Negative for voice change.    Eyes:  Negative for photophobia and visual disturbance.   Respiratory:  Negative for apnea and shortness of breath.    Cardiovascular:  Negative for chest pain and leg swelling.   Gastrointestinal:  Negative for constipation and nausea.   Genitourinary:  Negative for difficulty urinating.   Musculoskeletal:  Negative for back pain, gait problem and neck pain.  "  Skin:  Negative for color change and pallor.   Neurological:  Positive for tremors. Negative for dizziness, seizures, syncope, weakness and numbness.   Hematological:  Negative for adenopathy.   Psychiatric/Behavioral:  Positive for dysphoric mood. Negative for agitation, confusion and decreased concentration.        PHYSICAL:  /76 (BP Location: Left arm, Patient Position: Sitting, BP Method: Medium (Automatic))   Pulse 69   Ht 5' 6" (1.676 m)   Wt 64.8 kg (142 lb 12 oz)   BMI 23.04 kg/m²     General Medical Examination:  General: The patient is alert and cooperative with the exam.   HEENT: Normocephalic, atraumatic.   Neck: Supple.   Chest: Unlabored breathing.   CV: Symmetric pulses.   Ext: No clubbing, cyanosis, or edema.     Mental Status:  General: Good hygiene, appropriate appearance.  Mood/Affect: Appropriate/congruent.  Level of consciousness: Awake, alert.  Orientation: Oriented to person, place, time and situation.  Language: Mild scanning    Cranial nerves:  I: Not tested  II: PERRL, VFF to counting  III, IV, VI: EOMI with conjugate gaze and no nystagmus on end gaze  V: Facial sensation intact and symmetric over the bilateral V1-V3  VII: Facial muscle activation intact and symmetric over the bilateral upper and lower face  VIII: Hearing intact in the b/l ears and symmetrical to finger rub  IX, X, XII: TUP midline  X: SCMs and shoulder shrug full strength b/l and symmetric    Motor:   Strength Intact throughout upper and lower extremities, 5/5.    DTRs:  ? Biceps Triceps Brachioradialis Knee Ankle   Left 3+ 2+ 2+ 3+    Right 3+ 2+ 2+ 3+      No cervical dystonia  At times her voice cuts out to a whisper    ? Finger taps Finger flicks BULL Heel taps   Left 0 0 0 0   Right 0 0 0 0   Neck tone: 0  ? Arm Leg   Left 0 0   Right 0 0     Sensation:   -Light touch: Intact and symmetric in the bilateral upper and lower extremities.    Coordination:   -Finger to nose: nl  -BULL OK    Gait:  NL  -Arm " Swing: mild decreased R        Tremor Exam   Arms extended Arms in wing position, fingers almost touching Re-emergent Arms extended wrists extended Intention Resting Kinetic   Left ? ? ? ? ?+ ? ?   Right ? ? ? ? ?+ ? ?   Head tremor: No-No  NEG       Archimedes Spirals   Left nl   Right ?nl     DBS MRI Compatibility INFO  08/03/2023    IPG Model(s) P81213   Serial No. [id number]   Impedance OK   Battery 9 years   Pocket Adapter  NO      Initial        FINAL                                      Prior  ---------------  Initial      Programming changes to help R hand cerebellar tremor  FINAL  A and B new to help R hand cerebellar tremor  C and D are old      -----------------------------------    4/13/23 DBS Procedure:    Battery information: status ok.     iNITIAL   Group A - OLD  left VIM, C positive, 1 negative.  Amplitude 1.3 mA  60/120     Group B - block 0 - ON  left VIM, 0 positive, 1 negative.  Amplitude 1.5 mA  60/120    Group C- High freq   left VIM, 0 positive, 1 negative.  Amplitude 1.5 mA  60/120->160    Group D - segment 2 levels unprotected -ON  left VIM,   1b 1.5mA  2b 0.2mA  Amplitude 1.5 mA  60/120    R VIM same between groups  right VIM, C positive, 9 negative.  Amplitude 1.3 mA  60/120    Programming  Made copy of D to A  Made oval shaped stim field to help tremor  D is ON    FINAL        --------------------------    12/15/22 DBS  Procedure:  Electrode type:  Neurostimulator type: Percept    Battery information: status ok.   Electrode impedance check:  Left hemisphere: No electrodes out of range at 1.0mA.  Right hemisphere: No electrodes out of range at 1.0mA.    Initial -    Group A - OLD  left VIM, C positive, 1 negative.  Amplitude 1.3 mA  60/120     Group B - block 0 - ON  left VIM, 0 positive, 1 negative.  Amplitude 1.5 mA  60/120    Group C- 1b segment mode  1b ON - parasthesias mild - BEST  left VIM,   Amplitude 1.5 mA  60/120    Group D - 2ringmode   left VIM,   C+ 2- ringmode  Amplitude  1.5 mA  60/120    R VIM same between groups  right VIM, C positive, 9 negative.  Amplitude 1.3 mA  60/120    Programming  Copied group B to C and D for better R hand tremor control    Group C- High freq  left VIM, 0 positive, 1 negative.  Amplitude 1.5 mA  60/120->160    Group D - segment 2 levels unprotected  left VIM,   1b 1.5mA  2b 0.2mA  Amplitude 1.5 mA  60/120    R VIM same between groups  right VIM, C positive, 9 negative.  Amplitude 1.3 mA  60/120    FINAL   Group A - OLD  left VIM, C positive, 1 negative.  Amplitude 1.3 mA  60/120     Group B - block 0 - ON  left VIM, 0 positive, 1 negative.  Amplitude 1.5 mA  60/120    Group C- High freq - ON  left VIM, 0 positive, 1 negative.  Amplitude 1.5 mA  60/120->160    Group D - segment 2 levels unprotected  left VIM,   1b 1.5mA  2b 0.2mA  Amplitude 1.5 mA  60/120    R VIM same between groups  right VIM, C positive, 9 negative.  Amplitude 1.3 mA  60/120      --------------------------------------  5/12/22 DBS  Procedure:  Electrode type:  Neurostimulator type: Percept    Battery information: status ok.   Electrode impedance check:  Left hemisphere: No electrodes out of range at 1.0mA.  Right hemisphere: No electrodes out of range at 1.0mA.    Initial settings:  left VIM, C positive, 1 negative.  Amplitude 1.3 mA  60/120    right VIM, C positive, 9 negative.  Amplitude 1.3 mA  60/120    Programming - appears to have scanning speech and intention tremor R>L when stim ON- program to block cerebellar tracts    Turned stim OFF- speech better  B/L hand tremors come back    Copied Group A to B - block 0  left VIM, 0 positive, 1 negative.  Amplitude 1.5 mA  60/120  Postural tremor controlled  Intention tremor remains    Segments  1a ON - parasthesias mild  left VIM,   Amplitude 1.5 mA  60/120  Postural tremor controlled  Intention tremor remains    1b ON - parasthesias mild - BEST  left VIM,   Amplitude 1.5 mA  60/120  Postural tremor controlled  Intention tremor  better    1c ON- parasthesias mild  left VIM,   Amplitude 1.5 mA  60/120  Postural tremor controlled  Intention tremor remains  (maybe worse)    Made 1b Group C  1b ON - parasthesias mild - BEST  left VIM,   Amplitude 1.5 mA  60/120  Postural tremor controlled  Intention tremor better    Group D - 2ringmode to get away from cerebellar tracts  left VIM,   C+ 2- ringmode  Amplitude 1.5 mA  60/120  Postural tremor controlled  Intention tremor better    FINAL   Group A - OLD  left VIM, C positive, 1 negative.  Amplitude 1.3 mA  60/120     Group B - block 0  left VIM, 0 positive, 1 negative.  Amplitude 1.5 mA  60/120    Group C- 1b segment mode  1b ON - parasthesias mild - BEST  left VIM,   Amplitude 1.5 mA  60/120    Group D - 2ringmode   left VIM,   C+ 2- ringmode  Amplitude 1.5 mA  60/120    R VIM same between groups  right VIM, C positive, 9 negative.  Amplitude 1.3 mA  60/120  _______________________________________  Prior Procedure:  Electrode type:  Neurostimulator type: Percept    Battery information: status ok.   Electrode impedance check:  Left hemisphere: No electrodes out of range at 1.0mA.  Right hemisphere: No electrodes out of range at 1.0mA.    LEFT  Pulse width set at 60; Frequency set at 120  left VIM Monopolar review  C&3 @1.5mA- No paraesthesias   Spirals No better  C&2 @1.5mA- No paraesthesias   Spirals Better  C&1 @1.3mA- Mild fleeting R arm paraesthesias   Spirals better  @1.5mA- Mild fleeting R arm paraesthesias   Spirals same  C&0 @1.0mA- Mild fleeting R arm paraesthesias   Spirals MUCH better (may be best contact but parasthesias stronger)    RIGHT  Pulse width set at 60; Frequency set at 120  right VIM Monopolar review  C&11 @1mA - dizzy feeling  C&10 @1.0mA  C&9 @0.5mA - strong fleeting parasthesias - not help to tremor   @1.0mA - great tremor control   @1.3mA - BEST tremor control  C&8 @0.5mA - strong fleeting parasthesias  @0.7mA good tremor control    Final settings:  left VIM, C positive,  "1 negative.  Amplitude 1.3 mA  60/120    right VIM, C positive, 9 negative.  Amplitude 1.3 mA  60/120        Vitals:    12/28/23 0755   BP: 136/76   BP Location: Left arm   Patient Position: Sitting   BP Method: Medium (Automatic)   Pulse: 69   Weight: 64.8 kg (142 lb 12 oz)   Height: 5' 6" (1.676 m)       Assessment & Plan:    Problem List Items Addressed This Visit          Neuro    Essential tremor    Overview     Follow by Dr. Foss and Patricia.  Scheduled for DBS         Current Assessment & Plan     Typical fam hx and exam consistent with a debilitating essential tremor.  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

## 2024-01-23 ENCOUNTER — PATIENT MESSAGE (OUTPATIENT)
Dept: NEUROLOGY | Facility: CLINIC | Age: 74
End: 2024-01-23
Payer: COMMERCIAL

## 2024-08-13 ENCOUNTER — TELEPHONE (OUTPATIENT)
Dept: NEUROLOGY | Facility: CLINIC | Age: 74
End: 2024-08-13
Payer: COMMERCIAL

## 2024-08-13 NOTE — TELEPHONE ENCOUNTER
Called patient today about her message on the portal. Patient  the call and asked for an appointment with the doctor. I asked the patience what kind of an appointment. The patient asked for a DBS appointment. I inform the patient that the DBS appointments are booked out to the first week of Jan 2025. Patient was upset that we are booked out that far. I told the patient I will call her back when I can find something.

## 2024-08-14 ENCOUNTER — TELEPHONE (OUTPATIENT)
Dept: NEUROLOGY | Facility: CLINIC | Age: 74
End: 2024-08-14
Payer: COMMERCIAL

## 2024-08-14 NOTE — TELEPHONE ENCOUNTER
Called pt to offer appointment tomorrow am for DBS programming. She said she could make it.  Requested Dwayne to put on schedule.

## 2024-08-15 ENCOUNTER — OFFICE VISIT (OUTPATIENT)
Dept: NEUROLOGY | Facility: CLINIC | Age: 74
End: 2024-08-15
Payer: MEDICARE

## 2024-08-15 VITALS
HEART RATE: 60 BPM | BODY MASS INDEX: 22.29 KG/M2 | WEIGHT: 138.69 LBS | SYSTOLIC BLOOD PRESSURE: 114 MMHG | DIASTOLIC BLOOD PRESSURE: 70 MMHG | HEIGHT: 66 IN

## 2024-08-15 DIAGNOSIS — E53.8 B12 DEFICIENCY: Primary | ICD-10-CM

## 2024-08-15 DIAGNOSIS — G25.0 ESSENTIAL TREMOR: ICD-10-CM

## 2024-08-15 PROCEDURE — 99999 PR PBB SHADOW E&M-EST. PATIENT-LVL III: CPT | Mod: PBBFAC,,, | Performed by: PSYCHIATRY & NEUROLOGY

## 2024-08-15 NOTE — ASSESSMENT & PLAN NOTE
Typical fam hx and exam consistent with a debilitating essential tremor.  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

## 2024-08-15 NOTE — PROGRESS NOTES
"Movement Eval    Interval Hx    Since last visit,   B/L VIM DBS    Since last visit  Continues to have major tremor control  Group C is best  No major imbalance, no falls    Sleeping well now  Likes to craft and paint  Turns off DBS at night  Feels short term memory loss at times  Stopped gabapentin    Stopped B12 several months ago  -------------------    "Hpi:  70 yo woman with hx HTN, ET coming for tremor and DBS eval. She notes hand tremors since age 28's. She notes a b/l hand tremor starting slightly in youth and progressive very slowly. A this point she struggles to do eyemakeup. Struggle to do her hair. Spills food often and she is embarrassed to go out even with weighted silverware. Cannot type. Struggles to text message. Likes to watercolor. In the last year she has a mouth and leg tremor. 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 No falls.   Her speech has changed in the last  6-8mos. Can go to a whisper.  At times when she writes her neck and hand muscles feel tight.    No ETOH sensitivity    Father had hand tremors at age 40's - no issues with gait  Paternal grandmother also with tremors    3 children with termor    MRI brain 2021 NL    Med hx  Has tried propranolol 80mg  Takes primidone 150mg and this  Gabapentin failed"      PD Review of Symptoms:  Anosmia: none  Dysarthria/Hypophonia: some scanning sperech  Dysphagia/Sialorrhea: none  Depression: longstanding  Cognitive slowing: none  Hallucinations: several times saw an insect crawling  Urinary changes: mild incontinence  Constipation: None  Orthostasis: mild  Falls: none  Freezing: none  Micrographia: none  Sleep issues:  -RBD: none      Past Medical History:   Diagnosis Date    Dyslipidemia     HTN (hypertension)     Tremor        PAST SURGICAL HISTORY:  Past Surgical History:   Procedure Laterality Date    AUGMENTATION OF BREAST Bilateral     DEEP BRAIN STIMULATOR PLACEMENT Bilateral 01/04/2022    Procedure: " INSERTION, DEEP BRAIN STIMULATOR BILATERAL VIM;  Surgeon: Neal Hernández MD;  Location: [facility] OR 2ND FLR;  Service: Neurosurgery;  Laterality: Bilateral;  BILATERAL (LEFT FIRST, RIGHT SECOND)  INSERTION ELECTRODES FOR DEEP BRAIN STIMULATION IN VENTRALIS INTERMEDIUS/TEDS & SCD/ [employer]ICS, ALYSSIA WHITE. CO SURGERY WITH DR JT MITCHELL.    ESOPHAGOGASTRODUODENOSCOPY N/A 2021    Procedure: EGD (ESOPHAGOGASTRODUODENOSCOPY);  Surgeon: Juan R Watson MD;  Location: [facility];  Service: Endoscopy;  Laterality: N/A;  covid test  [place], instr portal -ml    HAND SURGERY      HYSTERECTOMY      INSERTION OF DEEP BRAIN STIMULATOR GENERATOR Left 2022    Procedure: INSERTION, PULSE GENERATOR, DEEP BRAIN STIMULATOR;  Surgeon: Neal Hernández MD;  [facility];  Service: Neurosurgery;  Laterality: Left;  INSERT PULSE GENERATOR FOR DEEP BRAIN STIMULATION/ TEDS & SCD/ [employer]TR[employer]ICS, ALYSSIA WHITE.    PLACEMENT OF FIDUCIAL SCREW INTO SPINE N/A 2022    Procedure: INSERTION, FIDUCIAL SCREW, SKULL;  Surgeon: Neal Hernández MD;  [facility];  Service: Neurosurgery;  Laterality: N/A;  INSERTION FIDUCIAL SCREWS FOR DEEP BRAIN STIMULATION/ TEDS & ACD/ MEDTRONICS, ALYSSIA WHITE/ CO SURGERY WITH DR JT MITCHELL       SOCIAL HISTORY:  Social History     Socioeconomic History    Marital status: Single   Tobacco Use    Smoking status: Former     Current packs/day: 0.00     Average packs/day: 2.0 packs/day for 31.0 years (62.0 ttl pk-yrs)     Types: Cigarettes     Start date: 1968     Quit date: 1999     Years since quittin.6    Smokeless tobacco: Former   Substance and Sexual Activity    Alcohol use: Not Currently    Drug use: Never     Social Determinants of Health     Financial Resource Strain: Low Risk  (2024)    Overall Financial Resource Strain (CARDIA)     Difficulty of Paying Living Expenses: Not very hard   Food Insecurity: No Food Insecurity (2024)    Hunger Vital Sign      Worried About Running Out of Food in the Last Year: Never true     Ran Out of Food in the Last Year: Never true   Physical Activity: Sufficiently Active (8/14/2024)    Exercise Vital Sign     Days of Exercise per Week: 4 days     Minutes of Exercise per Session: 60 min   Stress: No Stress Concern Present (8/14/2024)    [employer] - Occupational Stress Questionnaire     Feeling of Stress : Only a little   Housing Stability: Unknown (8/14/2024)    Housing Stability Vital Sign     Unable to Pay for Housing in the Last Year: No       FAMILY HISTORY:  Family History   Problem Relation Name Age of Onset    Alzheimer's disease Mother      Cancer Father         ALLERGIES AND MEDICATIONS: updated and reviewed.  Review of patient's allergies indicates:   Allergen Reactions    Iodine and iodide containing products     Iodine Nausea And Vomiting     Current Outpatient Medications   Medication Sig Dispense Refill    albuterol-ipratropium (DUO-NEB) 2.5 mg-0.5 mg/3 mL nebulizer solution Take 3 mLs by nebulization every 6 (six) hours as needed for Wheezing. 100 each 1    amLODIPine (NORVASC) 5 MG tablet Take 5 mg by mouth once daily.      mirtazapine (REMERON) 7.5 MG Tab Take 1 tablet (7.5 mg total) by mouth every evening. 30 tablet 11    oxybutynin (DITROPAN) 5 MG Tab Take 5 mg by mouth 2 (two) times daily.      triamcinolone acetonide 0.1% (KENALOG) 0.1 % ointment Apply topically 2 (two) times daily. 15 g 1    cyanocobalamin, vitamin B-12, 1,000 mcg Cap Take 1 tablet by mouth once daily. 30 capsule 12    omeprazole (PRILOSEC) 20 MG capsule Take 1 capsule (20 mg total) by mouth once daily. (Patient not taking: Reported on 12/28/2023) 30 capsule 11    sodium chloride 3% 3 % nebulizer solution 4 cc of 3% saline into nebs twice daily prn (Patient not taking: Reported on 12/28/2023) 360 mL 3    tiotropium (SPIRIVA WITH HANDIHALER) 18 mcg inhalation capsule Inhale 1 capsule (18 mcg total) into the lungs  "once daily. 30 capsule 3     No current facility-administered medications for this visit.       Review of Systems   Constitutional:  Negative for activity change, appetite change, fever and unexpected weight change.   HENT:  Positive for trouble swallowing. Negative for voice change.    Eyes:  Negative for photophobia and visual disturbance.   Respiratory:  Negative for apnea and shortness of breath.    Cardiovascular:  Negative for chest pain and leg swelling.   Gastrointestinal:  Negative for constipation and nausea.   Genitourinary:  Negative for difficulty urinating.   Musculoskeletal:  Negative for back pain, gait problem and neck pain.   Skin:  Negative for color change and pallor.   Neurological:  Positive for tremors. Negative for dizziness, seizures, syncope, weakness and numbness.   Hematological:  Negative for adenopathy.   Psychiatric/Behavioral:  Positive for dysphoric mood. Negative for agitation, confusion and decreased concentration.        PHYSICAL:  /70 (BP Location: Right arm, Patient Position: Sitting, BP Method: Medium (Automatic))   Pulse 60   Ht 5' 6" (1.676 m)   Wt 62.9 kg (138 lb 10.7 oz)   BMI 22.38 kg/m²     General Medical Examination:  General: The patient is alert and cooperative with the exam.   HEENT: Normocephalic, atraumatic.   Neck: Supple.   Chest: Unlabored breathing.   CV: Symmetric pulses.   Ext: No clubbing, cyanosis, or edema.     Mental Status:  General: Good hygiene, appropriate appearance.  Mood/Affect: Appropriate/congruent.  Level of consciousness: Awake, alert.  Orientation: Oriented to person, place, time and situation.  Language: Mild scanning    Cranial nerves:  I: Not tested  II: PERRL, VFF to counting  III, IV, VI: EOMI with conjugate gaze and no nystagmus on end gaze  V: Facial sensation intact and symmetric over the bilateral V1-V3  VII: Facial muscle activation intact and symmetric over the bilateral upper and lower face  VIII: Hearing intact in the " b/l ears and symmetrical to finger rub  IX, X, XII: TUP midline  X: SCMs and shoulder shrug full strength b/l and symmetric    Motor:   Strength Intact throughout upper and lower extremities, 5/5.    DTRs:  ? Biceps Triceps Brachioradialis Knee Ankle   Left 3+ 2+ 2+ 3+    Right 3+ 2+ 2+ 3+      No cervical dystonia  At times her voice cuts out to a whisper    ? Finger taps Finger flicks BULL Heel taps   Left 0 0 0 0   Right 0 0 0 0   Neck tone: 0  ? Arm Leg   Left 0 0   Right 0 0     Sensation:   -Light touch: Intact and symmetric in the bilateral upper and lower extremities.    Coordination:   -Finger to nose: nl  -BULL OK    Gait:  NL  -Arm Swing: mild decreased R        Tremor Exam   Arms extended Arms in wing position, fingers almost touching Re-emergent Arms extended wrists extended Intention Resting Kinetic   Left ? ? ? ? ?+ ? ?   Right ? ? ? ? ?+ ? ?   Head tremor: No-No  NEG       Archimedes Spirals   Left nl   Right ?nl     DBS MRI Compatibility INFO  08/03/2023    IPG Model(s) S94178   Serial No. [id number]   Impedance OK   Battery 5 years - 75%   Pocket Adapter  NO        Initial        Programming  Made group D to save battery  Tremor control equal to C      FINAL        --Prior----------------  Initial        FINAL                                      Prior  ---------------  Initial      Programming changes to help R hand cerebellar tremor  FINAL  A and B new to help R hand cerebellar tremor  C and D are old      -----------------------------------    4/13/23 DBS Procedure:    Battery information: status ok.     iNITIAL   Group A - OLD  left VIM, C positive, 1 negative.  Amplitude 1.3 mA  60/120     Group B - block 0 - ON  left VIM, 0 positive, 1 negative.  Amplitude 1.5 mA  60/120    Group C- High freq   left VIM, 0 positive, 1 negative.  Amplitude 1.5 mA  60/120->160    Group D - segment 2 levels unprotected -ON  left VIM,   1b 1.5mA  2b 0.2mA  Amplitude 1.5 mA  60/120    R VIM same between  groups  right VIM, C positive, 9 negative.  Amplitude 1.3 mA  60/120    Programming  Made copy of D to A  Made oval shaped stim field to help tremor  D is ON    FINAL        --------------------------    12/15/22 DBS  Procedure:  Electrode type:  Neurostimulator type: Percept    Battery information: status ok.   Electrode impedance check:  Left hemisphere: No electrodes out of range at 1.0mA.  Right hemisphere: No electrodes out of range at 1.0mA.    Initial -    Group A - OLD  left VIM, C positive, 1 negative.  Amplitude 1.3 mA  60/120     Group B - block 0 - ON  left VIM, 0 positive, 1 negative.  Amplitude 1.5 mA  60/120    Group C- 1b segment mode  1b ON - parasthesias mild - BEST  left VIM,   Amplitude 1.5 mA  60/120    Group D - 2ringmode   left VIM,   C+ 2- ringmode  Amplitude 1.5 mA  60/120    R VIM same between groups  right VIM, C positive, 9 negative.  Amplitude 1.3 mA  60/120    Programming  Copied group B to C and D for better R hand tremor control    Group C- High freq  left VIM, 0 positive, 1 negative.  Amplitude 1.5 mA  60/120->160    Group D - segment 2 levels unprotected  left VIM,   1b 1.5mA  2b 0.2mA  Amplitude 1.5 mA  60/120    R VIM same between groups  right VIM, C positive, 9 negative.  Amplitude 1.3 mA  60/120    FINAL   Group A - OLD  left VIM, C positive, 1 negative.  Amplitude 1.3 mA  60/120     Group B - block 0 - ON  left VIM, 0 positive, 1 negative.  Amplitude 1.5 mA  60/120    Group C- High freq - ON  left VIM, 0 positive, 1 negative.  Amplitude 1.5 mA  60/120->160    Group D - segment 2 levels unprotected  left VIM,   1b 1.5mA  2b 0.2mA  Amplitude 1.5 mA  60/120    R VIM same between groups  right VIM, C positive, 9 negative.  Amplitude 1.3 mA  60/120      --------------------------------------  5/12/22 DBS  Procedure:  Electrode type:  Neurostimulator type: Percept    Battery information: status ok.   Electrode impedance check:  Left hemisphere: No electrodes out of range at  1.0mA.  Right hemisphere: No electrodes out of range at 1.0mA.    Initial settings:  left VIM, C positive, 1 negative.  Amplitude 1.3 mA  60/120    right VIM, C positive, 9 negative.  Amplitude 1.3 mA  60/120    Programming - appears to have scanning speech and intention tremor R>L when stim ON- program to block cerebellar tracts    Turned stim OFF- speech better  B/L hand tremors come back    Copied Group A to B - block 0  left VIM, 0 positive, 1 negative.  Amplitude 1.5 mA  60/120  Postural tremor controlled  Intention tremor remains    Segments  1a ON - parasthesias mild  left VIM,   Amplitude 1.5 mA  60/120  Postural tremor controlled  Intention tremor remains    1b ON - parasthesias mild - BEST  left VIM,   Amplitude 1.5 mA  60/120  Postural tremor controlled  Intention tremor better    1c ON- parasthesias mild  left VIM,   Amplitude 1.5 mA  60/120  Postural tremor controlled  Intention tremor remains  (maybe worse)    Made 1b Group C  1b ON - parasthesias mild - BEST  left VIM,   Amplitude 1.5 mA  60/120  Postural tremor controlled  Intention tremor better    Group D - 2ringmode to get away from cerebellar tracts  left VIM,   C+ 2- ringmode  Amplitude 1.5 mA  60/120  Postural tremor controlled  Intention tremor better    FINAL   Group A - OLD  left VIM, C positive, 1 negative.  Amplitude 1.3 mA  60/120     Group B - block 0  left VIM, 0 positive, 1 negative.  Amplitude 1.5 mA  60/120    Group C- 1b segment mode  1b ON - parasthesias mild - BEST  left VIM,   Amplitude 1.5 mA  60/120    Group D - 2ringmode   left VIM,   C+ 2- ringmode  Amplitude 1.5 mA  60/120    R VIM same between groups  right VIM, C positive, 9 negative.  Amplitude 1.3 mA  60/120  _______________________________________  Prior Procedure:  Electrode type:  Neurostimulator type: Percept    Battery information: status ok.   Electrode impedance check:  Left hemisphere: No electrodes out of range at 1.0mA.  Right hemisphere: No electrodes out of  "range at 1.0mA.    LEFT  Pulse width set at 60; Frequency set at 120  left VIM Monopolar review  C&3 @1.5mA- No paraesthesias   Spirals No better  C&2 @1.5mA- No paraesthesias   Spirals Better  C&1 @1.3mA- Mild fleeting R arm paraesthesias   Spirals better  @1.5mA- Mild fleeting R arm paraesthesias   Spirals same  C&0 @1.0mA- Mild fleeting R arm paraesthesias   Spirals MUCH better (may be best contact but parasthesias stronger)    RIGHT  Pulse width set at 60; Frequency set at 120  right VIM Monopolar review  C&11 @1mA - dizzy feeling  C&10 @1.0mA  C&9 @0.5mA - strong fleeting parasthesias - not help to tremor   @1.0mA - great tremor control   @1.3mA - BEST tremor control  C&8 @0.5mA - strong fleeting parasthesias  @0.7mA good tremor control    Final settings:  left VIM, C positive, 1 negative.  Amplitude 1.3 mA  60/120    right VIM, C positive, 9 negative.  Amplitude 1.3 mA  60/120        Vitals:    08/15/24 0755   BP: 114/70   BP Location: Right arm   Patient Position: Sitting   BP Method: Medium (Automatic)   Pulse: 60   Weight: 62.9 kg (138 lb 10.7 oz)   Height: 5' 6" (1.676 m)       Assessment & Plan:    Problem List Items Addressed This Visit          Neuro    Essential tremor - Primary    Overview     Follow by Dr. Foss and Patricia.  Scheduled for DBS         Current Assessment & Plan     Typical fam hx and exam consistent with a debilitating essential tremor.  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

## 2024-08-20 ENCOUNTER — LAB VISIT (OUTPATIENT)
Dept: LAB | Facility: HOSPITAL | Age: 74
End: 2024-08-20
Attending: PSYCHIATRY & NEUROLOGY
Payer: MEDICARE

## 2024-08-20 DIAGNOSIS — E53.8 B12 DEFICIENCY: ICD-10-CM

## 2024-08-20 PROCEDURE — 83921 ORGANIC ACID SINGLE QUANT: CPT | Performed by: PSYCHIATRY & NEUROLOGY

## 2024-08-20 PROCEDURE — 82607 VITAMIN B-12: CPT | Performed by: PSYCHIATRY & NEUROLOGY

## 2024-08-20 PROCEDURE — 36415 COLL VENOUS BLD VENIPUNCTURE: CPT | Performed by: PSYCHIATRY & NEUROLOGY

## 2024-08-21 LAB — VIT B12 SERPL-MCNC: 312 NG/L (ref 180–914)

## 2024-08-23 LAB — METHYLMALONATE SERPL-SCNC: 0.14 NMOL/ML

## 2024-08-27 ENCOUNTER — PATIENT MESSAGE (OUTPATIENT)
Dept: NEUROLOGY | Facility: CLINIC | Age: 74
End: 2024-08-27
Payer: MEDICARE

## 2025-04-17 ENCOUNTER — OFFICE VISIT (OUTPATIENT)
Dept: PULMONOLOGY | Facility: CLINIC | Age: 75
End: 2025-04-17
Payer: MEDICARE

## 2025-04-17 ENCOUNTER — TELEPHONE (OUTPATIENT)
Dept: PHARMACY | Facility: CLINIC | Age: 75
End: 2025-04-17
Payer: MEDICARE

## 2025-04-17 VITALS
SYSTOLIC BLOOD PRESSURE: 136 MMHG | DIASTOLIC BLOOD PRESSURE: 80 MMHG | BODY MASS INDEX: 23.15 KG/M2 | HEART RATE: 81 BPM | HEIGHT: 66 IN | OXYGEN SATURATION: 95 % | WEIGHT: 144.06 LBS

## 2025-04-17 DIAGNOSIS — R13.10 DYSPHAGIA, UNSPECIFIED TYPE: ICD-10-CM

## 2025-04-17 DIAGNOSIS — J44.9 CHRONIC OBSTRUCTIVE PULMONARY DISEASE, UNSPECIFIED COPD TYPE: Primary | ICD-10-CM

## 2025-04-17 DIAGNOSIS — J84.9 ILD (INTERSTITIAL LUNG DISEASE): ICD-10-CM

## 2025-04-17 PROCEDURE — G2211 COMPLEX E/M VISIT ADD ON: HCPCS | Mod: S$GLB,,, | Performed by: INTERNAL MEDICINE

## 2025-04-17 PROCEDURE — 3288F FALL RISK ASSESSMENT DOCD: CPT | Mod: CPTII,S$GLB,, | Performed by: INTERNAL MEDICINE

## 2025-04-17 PROCEDURE — 3079F DIAST BP 80-89 MM HG: CPT | Mod: CPTII,S$GLB,, | Performed by: INTERNAL MEDICINE

## 2025-04-17 PROCEDURE — 1159F MED LIST DOCD IN RCRD: CPT | Mod: CPTII,S$GLB,, | Performed by: INTERNAL MEDICINE

## 2025-04-17 PROCEDURE — 99999 PR PBB SHADOW E&M-EST. PATIENT-LVL III: CPT | Mod: PBBFAC,,, | Performed by: INTERNAL MEDICINE

## 2025-04-17 PROCEDURE — 1126F AMNT PAIN NOTED NONE PRSNT: CPT | Mod: CPTII,S$GLB,, | Performed by: INTERNAL MEDICINE

## 2025-04-17 PROCEDURE — 1101F PT FALLS ASSESS-DOCD LE1/YR: CPT | Mod: CPTII,S$GLB,, | Performed by: INTERNAL MEDICINE

## 2025-04-17 PROCEDURE — 3075F SYST BP GE 130 - 139MM HG: CPT | Mod: CPTII,S$GLB,, | Performed by: INTERNAL MEDICINE

## 2025-04-17 PROCEDURE — 99214 OFFICE O/P EST MOD 30 MIN: CPT | Mod: S$GLB,,, | Performed by: INTERNAL MEDICINE

## 2025-04-17 RX ORDER — TIOTROPIUM BROMIDE INHALATION SPRAY 3.12 UG/1
5 SPRAY, METERED RESPIRATORY (INHALATION) DAILY
Qty: 4 G | Refills: 4 | Status: SHIPPED | OUTPATIENT
Start: 2025-04-17

## 2025-04-17 NOTE — PROGRESS NOTES
"Elsa Steel  was seen as a follow up.    CHIEF COMPLAINT:  Interstitial Lung Disease and Shortness of Breath      HISTORY OF PRESENT ILLNESS: Elsa Steel is a 74 y.o. female  has a past medical history of Dyslipidemia, HTN (hypertension), and Tremor.      Our first encounter was 3/10/21.  Patient smokes for 30 years up to 2 ppd.  Patient quit smoking in .  Patient with mckeon x 1/2 block for past 2 years.  +daily coughing and wheezing.  Wheezing is worse at night.  Clear to yellowish phlegm.  Intermittent nasal congestion.  Occasional gerd (every 2-3 days). Occasional chest tightness a/w sob.  No fever/chill.  Still work at [employer] in [employer].  +weight gain 25 lbs since .  +solid and liquid food dysphagia.  Once every 2 weeks.    S/p speech and gi evaluation.  S/p egd with small hiatal hernia.  Dysphagia improved with ppi  Pft with moderate obstruction.  Patient was started on spiriva.  Did not get spiriva due high copay.    Dyspnea with heavy exertion.  Stil active with [employer] shopping.      s/p DBS for essential tremor by Dr. Hernández.  +improvement in tremor.  Still with intermittent "choking sensation."  +dyspnea after 1/2 block.  Choking sensation improve with ppi.    CT with basilar bronchiectasis  and airspace disease. Patient s/p 6 min walk with desat of 88%.  Patient decline oxygen due to high copay.  Breathing improve with airway clearance.        Additional Pulmonary History:   Occupational/Environmental Exposures:  retire [employer]  Exposure to Animals/Pets:  dog  Foreign Travel History:  denied  History of exposures to TB:  grandmother with tb  Family History of Lung Cancer:  father  from lung cancer.    Tobacco:  Use to smoke 2 ppd x 20-30 years.  Quit in .   Quit smoke after death of father.   Childhood history of Lung Disease:  denied  Chest surgery or trauma:  denied     PAST MEDICAL HISTORY:    Active Ambulatory Problems     Diagnosis Date Noted    Dyspnea on exertion " 03/10/2021    Dysphagia 03/10/2021    Essential tremor 06/21/2021    Scanning speech 06/21/2021    Depression 09/22/2021    Adjustment disorder with anxious mood 10/15/2021    Chest pain, atypical 12/10/2021    Pulmonary nodules 12/11/2022    HTN (hypertension) 12/11/2022    Abdominal pain 12/11/2022    Calculus of gallbladder with acute cholecystitis without obstruction 12/12/2022    Dysphonia 12/15/2022    ILD (interstitial lung disease) 01/31/2023    Memory change 04/13/2023    COPD (chronic obstructive pulmonary disease) 04/25/2023    Vertigo 08/03/2023    Insomnia 12/28/2023    B12 deficiency 08/15/2024     Resolved Ambulatory Problems     Diagnosis Date Noted    No Resolved Ambulatory Problems     Past Medical History:   Diagnosis Date    Dyslipidemia     Tremor                 PAST SURGICAL HISTORY:    Past Surgical History:   Procedure Laterality Date    AUGMENTATION OF BREAST Bilateral     DEEP BRAIN STIMULATOR PLACEMENT Bilateral 01/04/2022    Procedure: INSERTION, DEEP BRAIN STIMULATOR BILATERAL VIM;  Surgeon: Neal Hernández MD;  Location: [address];  Service: Neurosurgery;  Laterality: Bilateral;  BILATERAL (LEFT FIRST, RIGHT SECOND)  INSERTION ELECTRODES FOR DEEP BRAIN STIMULATION IN VENTRALIS INTERMEDIUS/TEDS & SCD/ MEDAL[facility] [place]. CO SURGERY WITH DR JT MITCHELL.    ESOPHAGOGASTRODUODENOSCOPY N/A 05/21/2021    Procedure: EGD (ESOPHAGOGASTRODUODENOSCOPY);  Surgeon: Juan R Watson MD;  Location: [facility];  Service: Endoscopy;  Laterality: N/A;  covid test 5/18 [place], instr portal -ml    HAND SURGERY      HYSTERECTOMY      INSERTION OF DEEP BRAIN STIMULATOR GENERATOR Left 01/11/2022    Procedure: INSERTION, PULSE GENERATOR, DEEP BRAIN STIMULATOR;  Surgeon: Neal Hernández MD;  Location: [address];  Service: Neurosurgery;  Laterality: Left;  INSERT PULSE GENERATOR FOR DEEP BRAIN STIMULATION/ TEDS & SCD/ ALYSSIA COOLEY.    PLACEMENT OF FIDUCIAL SCREW INTO SPINE N/A  2022    Procedure: INSERTION, FIDUCIAL SCREW, SKULL;  Surgeon: Neal Hernández MD;  [facility];  Service: Neurosurgery;  Laterality: N/A;  INSERTION FIDUCIAL SCREWS FOR DEEP BRAIN STIMULATION/ TEDS & ACD/ [facility], ALYSSIA LANDRY/ CO SURGERY WITH DR JT MITCHELL         FAMILY HISTORY:                Family History   Problem Relation Name Age of Onset    Alzheimer's disease Mother      Cancer Father         SOCIAL HISTORY:          Tobacco:   Social History     Tobacco Use   Smoking Status Former    Current packs/day: 0.00    Average packs/day: 2.0 packs/day for 31.0 years (62.0 ttl pk-yrs)    Types: Cigarettes    Start date: 1968    Quit date: 1999    Years since quittin.3   Smokeless Tobacco Former     alcohol use:    Social History     Substance and Sexual Activity   Alcohol Use Not Currently               Occupation:  [employer].    ALLERGIES:    Review of patient's allergies indicates:   Allergen Reactions    Iodine and iodide containing products     Iodine Nausea And Vomiting       CURRENT MEDICATIONS:    Current Outpatient Medications   Medication Sig Dispense Refill    albuterol-ipratropium (DUO-NEB) 2.5 mg-0.5 mg/3 mL nebulizer solution Take 3 mLs by nebulization every 6 (six) hours as needed for Wheezing. 100 each 1    amLODIPine (NORVASC) 5 MG tablet Take 5 mg by mouth once daily.      oxybutynin (DITROPAN) 5 MG Tab Take 5 mg by mouth 2 (two) times daily.      sodium chloride 3% 3 % nebulizer solution 4 cc of 3% saline into nebs twice daily prn 360 mL 3    triamcinolone acetonide 0.1% (KENALOG) 0.1 % ointment Apply topically 2 (two) times daily. 15 g 1    cyanocobalamin, vitamin B-12, 1,000 mcg Cap Take 1 tablet by mouth once daily. 30 capsule 12    mirtazapine (REMERON) 7.5 MG Tab Take 1 tablet (7.5 mg total) by mouth every evening. 30 tablet 11    omeprazole (PRILOSEC) 20 MG capsule Take 1 capsule (20 mg total) by mouth once daily. (Patient not taking: Reported on  "12/28/2023) 30 capsule 11    tiotropium bromide (SPIRIVA RESPIMAT) 2.5 mcg/actuation inhaler Inhale 2 puffs into the lungs once daily. Controller 4 g 4     No current facility-administered medications for this visit.                  REVIEW OF SYSTEMS:     Pulmonary related symptoms as per HPI.  Gen:  no weight loss, +weight gain 25 lbs since 2020, no fever, no night sweat  HEENT:  no visual changes, no sore throat, no hearing loss  CV:  Per hpi  GI:  no melena, no hematochezia, no diarhea, no constipation.  :  no dysuria, no hematuria, no hesistancy, no dribbling  Neuro:  no syncope, no vertigo, no tinnitus, occasional balance issue a/w driving.  +tremor  Psych:  No homocide or suicide ideation; + depression.  Endocrine:  No heat or cold intolerance.  Sleep:  No snoring; no witnessed apnea.  Rested upon awake.    Otherwise, a balance of systems reviewed is negative.          PHYSICAL EXAM:  Vitals:    04/17/25 1047   BP: 136/80   Pulse: 81   SpO2: 95%   Weight: 65.4 kg (144 lb 1.1 oz)   Height: 5' 6" (1.676 m)   PainSc: 0-No pain     Body mass index is 23.25 kg/m².     GENERAL:  well develop; no apparent distress  HEENT:  no nasal congestion; no discharge noted; class 3 modified mallampatti.   NECK:  supple; no palpable masses.  CARDIO: regular rate and rhythm  PULM:  clear to auscultation bilaterally; no intercostals retractions; no accessory muscle usage   ABDOMEN:  soft nontender/nondistended.  +bowel sound  EXTREMITIES no cce  NEURO:  CN II-XII intact.  5/5 motor in all extremities.  sensation grossly intact   to light touch.  PSYCH:  normal affect.  Alert and oriented x 4    LABS  Pulmonary Functions Testing Results(personally reviewed):    PFT 3/16/21  Ratio of 58%; FVC 2.35 L (79%); FEV1 1.36 L (60%); TLC 3.98 L (75%); dlco 12 (55%)   PFT 2/15/23 Ratio of 67%; FVC 2.36 L (82%); FEV1 1.58 L (72%); TLC 4.67 L (89%); dlco 10 (46%) discrepancy between VA and TLC makes dlco interpretation unreliable.    ABG " (personally reviewed):  none  CXR (personally reviewed):  7/21/20 no consolidation or effusion.  Bilateral implants.  Poor inspiratory effort.  ?increase reticulation  CT CHEST(personally reviewed):   12/11/22 basilar bronchiectasis and airspace disease.      Stress echo 4/22/21  The left ventricle is normal in size with normal systolic function.  The estimated ejection fraction is 55%.  Normal left ventricular diastolic function.  Normal right ventricular size with normal right ventricular systolic function.  Mild mitral regurgitation.  Intermediate central venous pressure (8 mmHg).  The estimated PA systolic pressure is 38 mmHg.  Trivial posterior pericardial effusion.  There were no arrhythmias during stress.  The ECG portion of this study is negative for myocardial ischemia.  The stress echo portion of this study is negative for myocardial ischemia.  Only 79% of maximum predicted heart rate achieved. Consider repeating as a pharmacological nuclear stress test      ASSESSMENT/PLAN  Problem List Items Addressed This Visit       COPD (chronic obstructive pulmonary disease) - Primary    Overview   -ratio of 67% wit fev1 72  -responded well to albuterol nebs.  -will restart lama and reassess.  Will consult patient assistance.           Relevant Medications    tiotropium bromide (SPIRIVA RESPIMAT) 2.5 mcg/actuation inhaler    Other Relevant Orders    Ambulatory referral/consult to Pharmacy Assistance    Dysphagia    Overview   -Intermittent solid and liquid dysphagia.  Normal swallow study without evidence of aspiration.  S/p egd with normal esophagus and positive hiatal hernia.   - In the past, patient declined ppi due to lack of symptoms.    -recently started on famotidine (12/22) with improvement in symptoms  -ct with basilar airspace space disease c/w chronic aspiration.    -continue with ppi         ILD (interstitial lung disease)    Overview   -basilar airspace disease and traction bronchietasis.  No  honeycombing.  ?chronic aspiration.   -was on famotidine.  Now on ppi.  -repeat pft in 2/15/23 with improvement in tlc, fvc and fev1 when compared to 3/16/21  continue with albuterol + hypertonic saline.  Encourage daily nebs.                      Patient will No follow-ups on file. with md/np.    25 minutes of total time spent on the encounter, which includes face to face time and non-face to face time preparing to see the patient (eg, review of tests), Obtaining and/or reviewing separately obtained history, documenting clinical information in the electronic or other health record, independently interpreting results (not separately reported) and communicating results to the patient/family/caregiver, or Care coordination (not separately reported).      Visit today included increased complexity associated with the care of the episodic problem copd addressed and managing the longitudinal care of the patient due to the serious and/or complex managed problem(s) copd.

## 2025-04-17 NOTE — TELEPHONE ENCOUNTER
----- Message from Denton Paulino sent at 4/17/2025 12:33 PM CDT -----  Regarding: RE: Order for VERONICA PACE  Medication(s) needing assistance with? Spiriva-------------------------------------------------------------------------------------------------------------------------------Armand Ms. Pace's case has been assigned to Franky Escobar . Please ensure the chart reflects a current order for the requested medication written by an [facility] provider. The following documentation may be required from the patient to begin the application process:Proof of household Income, Copy of all insurance cards, Completed Medication Access Center Authorization Forms, and [facility]/Medicare Extra Help Denial Letter.Provider may review further progress in the patient's chart through Telephone Encounter notes from Pharmacy Patient Assistance. Thank you, Pharmacy Patient Assistance Team  ----- Message -----  From: Manuel Sarkar MD  Sent: 4/17/2025  11:37 AM CDT  To: Pharmacy Patient Assistance Team  Subject: Order for VERONICA PACE

## 2025-04-17 NOTE — LETTER
April 17, 2025    Elsa PAT Steel  [address]  [facility] [address]             Dear MsSnu Lantiguann,      My name is Franky Escobar   I am reaching out on behalf of [facility]s Pharmacy Patient Assistance Team in regards to your request for medication assistance. Our goal is to assist qualified [facility] patients with obtaining financial assistance for prescribed medications.     Please note that enrollment into available support may require the following documents:      Proof of household income(such as social security award letter, pension statement or 3 consecutive pay stubs)  Copy of all insurance cards (front and back)  Completed Medication Access Center authorization forms.       Please reach out to my phone number below if you are still in need of assistance with your medications. Follow-up call will be made in 5 business days. We look forward to hearing from you soon!    Thank you for choosing [facility] for your healthcare needs      Sincerely  Franky GOODE @[phone]  Pharmacy Patient Assistance Team  [address] [place] [address]  [address] [place] [address]  Fax: [phone]  Email: [email or web address]

## 2025-04-17 NOTE — TELEPHONE ENCOUNTER
Welcome letter sent via letter and portal message  to inform patient of PAP application process for Spiriva Inhaler. Please ensure the chart reflects a current order for the requested medication. Follow-up phone call will be made to patient in 5 business days.    Your help is appreciated  Franky Escobar   Pharmacy Patient Assistance Team

## 2025-04-22 ENCOUNTER — TELEPHONE (OUTPATIENT)
Dept: PULMONOLOGY | Facility: CLINIC | Age: 75
End: 2025-04-22
Payer: MEDICARE

## 2025-04-22 DIAGNOSIS — J44.9 CHRONIC OBSTRUCTIVE PULMONARY DISEASE, UNSPECIFIED COPD TYPE: ICD-10-CM

## 2025-04-22 NOTE — TELEPHONE ENCOUNTER
----- Message from Med Assistant Idget sent at 4/22/2025 12:18 PM CDT -----  Clary Walton StaffCaller: Unspecified (Today, 12:06 PM)Type: Patient Call BackWho called: selfWhat is the request in detail: pt is requesting to have a medication change. Please callCan the clinic reply by [facility]? NoWould the patient rather a call back or a response via [facility]?  [facility] .[phone] (home)  Additional Information:

## 2025-04-22 NOTE — TELEPHONE ENCOUNTER
Message sent to Dr. Sarkar to send brand name Spiriva Respimat (Inhalation Aerosol Solution) to khari Chung MA  Pulm/Sleep [facility]  [phone]                     ----- Message from Med Assistant Sheldon sent at 4/22/2025 12:18 PM CDT -----  Clary Walton StaffCaller: Unspecified (Today, 12:06 PM)Type: Patient Call BackWho called: selfWhat is the request in detail: pt is requesting to have a medication change. Please callCan the clinic reply by [facility]? NoWould the patient rather a call back or a response via [facility]?  [facility]: .[phone] (home)  Additional Information:

## 2025-04-23 RX ORDER — TIOTROPIUM BROMIDE INHALATION SPRAY 3.12 UG/1
5 SPRAY, METERED RESPIRATORY (INHALATION) DAILY
Qty: 4 G | Refills: 4 | Status: SHIPPED | OUTPATIENT
Start: 2025-04-23

## 2025-05-12 ENCOUNTER — TELEPHONE (OUTPATIENT)
Facility: CLINIC | Age: 75
End: 2025-05-12
Payer: MEDICARE

## 2025-05-12 NOTE — TELEPHONE ENCOUNTER
"----- Message from Luz sent at 5/9/2025  4:52 PM CDT -----  Regarding: appt access  Contact: [phone]  .Name Of Caller: Self Contact Preference?: [phone] What is the nature of the call?: in reference to elijah a year f/u [facility] pl call  Additional Notes:"Thank you for all that you do for our patients"  "

## 2025-05-12 NOTE — TELEPHONE ENCOUNTER
Called patient to schedule 1 year follow up DBS appt with Dr. Foss. Patient confirmed appt time and date.

## 2025-05-22 ENCOUNTER — OFFICE VISIT (OUTPATIENT)
Dept: CARDIOLOGY | Facility: CLINIC | Age: 75
End: 2025-05-22
Payer: MEDICARE

## 2025-05-22 VITALS
DIASTOLIC BLOOD PRESSURE: 89 MMHG | HEART RATE: 78 BPM | OXYGEN SATURATION: 95 % | SYSTOLIC BLOOD PRESSURE: 133 MMHG | RESPIRATION RATE: 15 BRPM | HEIGHT: 66 IN | BODY MASS INDEX: 23.06 KG/M2 | WEIGHT: 143.5 LBS

## 2025-05-22 DIAGNOSIS — R55 SYNCOPE AND COLLAPSE: ICD-10-CM

## 2025-05-22 DIAGNOSIS — R42 POSTURAL DIZZINESS WITH PRESYNCOPE: ICD-10-CM

## 2025-05-22 DIAGNOSIS — R06.00 DYSPNEA, UNSPECIFIED TYPE: ICD-10-CM

## 2025-05-22 DIAGNOSIS — R07.9 CHEST PAIN, UNSPECIFIED TYPE: ICD-10-CM

## 2025-05-22 DIAGNOSIS — I50.32 CHRONIC DIASTOLIC HEART FAILURE: Primary | ICD-10-CM

## 2025-05-22 DIAGNOSIS — R55 POSTURAL DIZZINESS WITH PRESYNCOPE: ICD-10-CM

## 2025-05-22 DIAGNOSIS — I73.9 CLAUDICATION: ICD-10-CM

## 2025-05-22 LAB
OHS QRS DURATION: 56 MS
OHS QTC CALCULATION: 450 MS

## 2025-05-22 PROCEDURE — 99999 PR PBB SHADOW E&M-EST. PATIENT-LVL IV: CPT | Mod: PBBFAC,,, | Performed by: INTERNAL MEDICINE

## 2025-05-22 PROCEDURE — 93000 ELECTROCARDIOGRAM COMPLETE: CPT | Mod: S$GLB,,, | Performed by: INTERNAL MEDICINE

## 2025-05-22 RX ORDER — ATORVASTATIN CALCIUM 40 MG/1
40 TABLET, FILM COATED ORAL NIGHTLY
Qty: 90 TABLET | Refills: 3 | Status: SHIPPED | OUTPATIENT
Start: 2025-05-22

## 2025-05-22 RX ORDER — NAPROXEN SODIUM 220 MG/1
81 TABLET, FILM COATED ORAL DAILY
Qty: 90 TABLET | Refills: 3 | Status: SHIPPED | OUTPATIENT
Start: 2025-05-22 | End: 2026-05-22

## 2025-05-22 NOTE — PROGRESS NOTES
CARDIOVASCULAR CONSULTATION    REASON FOR CONSULT:   Elsa Steel is a 74 y.o. female who presents for No chief complaint on file.       Referred by:  No referring provider defined for this encounter.      HISTORY OF PRESENT ILLNESS:     History of Present Illness    CHIEF COMPLAINT:  Elsa presents today for chest tightness and pain.    HISTORY OF PRESENT ILLNESS:  She reports chest tightness and occasional pain that began 1 month ago. Symptoms are exertional, occurring with activities such as gardening, walking, and climbing stairs. Recently, she walked 1 mile and experienced significant symptoms including profuse sweating, chest discomfort, palpitations described as pounding, and palpitations described as racing. She experiences near-syncopal episodes 1 time weekly, associated with neck pain radiating to her head, describing it as feeling like blood is not reaching her brain. She also reports dizziness upon standing and LLE pain when relaxed or lying down.    MEDICAL HISTORY:  She underwent deep brain stimulation (DBS) surgery. Stress test and echo were normal in 2023.    MEDICATIONS:  She takes amlodipine for BP and B12 supplements. Dr. Lechuga recently prescribed nitroglycerin for cardiac symptoms.    SOCIAL HISTORY:  She is a former smoker with 25-year history of 2 packs per day, quit in 1999.         Results for orders placed or performed during the hospital encounter of 12/11/22   EKG 12-lead    Collection Time: 12/11/22  8:03 AM    Narrative    Test Reason : R07.9,    Vent. Rate : 069 BPM     Atrial Rate : 069 BPM     P-R Int : 184 ms          QRS Dur : 076 ms      QT Int : 406 ms       P-R-T Axes : 065 048 026 degrees     QTc Int : 435 ms    Normal sinus rhythm  Possible Anterior infarct (cited on or before 11-DEC-2022)  Abnormal ECG  When compared with ECG of 05-JAN-2022 10:30,  Significant changes have occurred  Confirmed by Riana CHAPA, Carla VARNER ([id number]) on 12/12/2022 10:46:49 PM    Referred By:  [email or web address]   SELF           Confirmed By:Carla Mayers MD          ECHO    Results for orders placed during the hospital encounter of 12/11/22    Echo    Interpretation Summary  · The left ventricle is normal in size with mild concentric hypertrophy and normal systolic function.  · The estimated ejection fraction is 65%.  · Grade I left ventricular diastolic dysfunction.  · Normal right ventricular size with normal right ventricular systolic function.  · Mild left atrial enlargement.  · Normal central venous pressure (3 mmHg).      STRESS TEST    Results for orders placed during the hospital encounter of 01/30/23    Nuclear Stress - Cardiology Interpreted    Interpretation Summary    Normal myocardial perfusion scan. There is no evidence of myocardial ischemia or infarction.    The LVEF is not accurate due to poor software boundary tracking during stress.    The ECG portion of the study is negative for ischemia.    The patient reported no chest pain during the stress test.    There were no arrhythmias during stress.        CATH    No results found for this or any previous visit.      PAST MEDICAL HISTORY:     Past Medical History:   Diagnosis Date    Dyslipidemia     HTN (hypertension)     Tremor        PAST SURGICAL HISTORY:     Past Surgical History:   Procedure Laterality Date    AUGMENTATION OF BREAST Bilateral     DEEP BRAIN STIMULATOR PLACEMENT Bilateral 01/04/2022    Procedure: INSERTION, DEEP BRAIN STIMULATOR BILATERAL VIM;  Surgeon: Neal Hernández MD;  Location: [address];  Service: Neurosurgery;  Laterality: Bilateral;  BILATERAL (LEFT FIRST, RIGHT SECOND)  INSERTION ELECTRODES FOR DEEP BRAIN STIMULATION IN VENTRALIS INTERMEDIUS/TEDS & SCD/ MEDYAMILETHS, ALYSSIA LANDRY. CO SURGERY WITH DR JT MITCHELL.    ESOPHAGOGASTRODUODENOSCOPY N/A 05/21/2021    Procedure: EGD (ESOPHAGOGASTRODUODENOSCOPY);  Surgeon: Juan R Watson MD;  Location: [facility];  Service: Endoscopy;  Laterality: N/A;  covid test 5/18  "[place], instr portal -ml    HAND SURGERY      HYSTERECTOMY      INSERTION OF DEEP BRAIN STIMULATOR GENERATOR Left 01/11/2022    Procedure: INSERTION, PULSE GENERATOR, DEEP BRAIN STIMULATOR;  Surgeon: Neal Hernández MD;  Location: [facility] OR [address] [place] [address];  Service: Neurosurgery;  Laterality: Left;  INSERT PULSE GENERATOR FOR DEEP BRAIN STIMULATION/ TEDS & SCD/ MEDTRONICS, ALYSSIA LANDRY.    PLACEMENT OF FIDUCIAL SCREW INTO SPINE N/A 01/04/2022    Procedure: INSERTION, FIDUCIAL SCREW, SKULL;  Surgeon: Neal Hernández MD;  [facility];  Service: Neurosurgery;  Laterality: N/A;  INSERTION FIDUCIAL SCREWS FOR DEEP BRAIN STIMULATION/ TEDS & ACD/ MEDTRONICS, ALYSSIA LANDRY/ CO SURGERY WITH DR JT MITCHELL           SOCIAL HISTORY:   Social History[1]    FAMILY HISTORY:     Family History   Problem Relation Name Age of Onset    Alzheimer's disease Mother      Cancer Father         REVIEW OF SYSTEMS:   Review of Systems   Constitutional: Negative.   HENT: Negative.     Eyes: Negative.    Cardiovascular:  Positive for chest pain, claudication and dyspnea on exertion.   Respiratory: Negative.     Endocrine: Negative.    Hematologic/Lymphatic: Negative.    Skin: Negative.    Musculoskeletal: Negative.    Gastrointestinal: Negative.    Genitourinary: Negative.    Neurological: Negative.    Psychiatric/Behavioral: Negative.     Allergic/Immunologic: Negative.        A 10 point review of systems was performed and all the pertinent positives have been mentioned. Rest of review of systems was negative.        PHYSICAL EXAM:     Vitals:    05/22/25 1006   BP: 133/89   Pulse: 78   Resp: 15    Body mass index is 23.16 kg/m².  Weight: 65.1 kg (143 lb 8.3 oz)   Height: 5' 6" (167.6 cm)     Physical Exam  Constitutional:       Appearance: Normal appearance. She is well-developed.   HENT:      Head: Normocephalic.   Eyes:      Pupils: Pupils are equal, round, and reactive to light.   Cardiovascular:      Rate and Rhythm: Normal rate and " regular rhythm.   Pulmonary:      Effort: Pulmonary effort is normal.      Breath sounds: Normal breath sounds.   Abdominal:      General: Bowel sounds are normal.      Palpations: Abdomen is soft.      Tenderness: There is no abdominal tenderness.   Musculoskeletal:         General: Normal range of motion.      Cervical back: Normal range of motion and neck supple.   Skin:     General: Skin is warm.   Neurological:      Mental Status: She is alert and oriented to person, place, and time.         Physical Exam              DATA:     Laboratory:  CBC:  Recent Labs   Lab 12/11/22  0826 12/12/22  0706 12/14/22  0341   WBC 9.28 11.03 8.60   Hemoglobin 14.5 14.0 13.0   Hematocrit 41.8 42.1 39.4   Platelets 293 280 275       CHEMISTRIES:  Recent Labs   Lab 12/11/22  0826 12/12/22  0706 12/14/22  0340   Glucose 120 H 99 99   Sodium 142 139 143   Potassium 4.0 3.4 L 4.3   BUN 15 9 13   Creatinine 0.7 0.8 0.7   Calcium 8.9 8.5 L 8.8       CARDIAC BIOMARKERS:  Recent Labs   Lab 12/11/22  1548 12/12/22  0007 12/12/22  0706   Troponin I 0.046 H 0.097 H 0.050 H       COAGS:  Recent Labs   Lab 12/12/22  0706   INR 1.0       LIPIDS/LFTS:  Recent Labs   Lab 12/11/22  0826 12/11/22  1527 12/11/22  1532 12/12/22  0706 12/14/22  0340   Cholesterol 298 H  --  301 H  --   --    Triglycerides 270 H  --  191 H  --   --    HDL 45  --  50  --   --    LDL Cholesterol 199.0 H  --  212.8 H  --   --    Non-HDL Cholesterol 253  --  251  --   --    AST 23 32  --  24 17   ALT 15 25  --  16 15       Hemoglobin A1C   Date Value Ref Range Status   07/23/2011 5.2 4.0 - 6.2 % Final       TSH  Recent Labs   Lab 04/13/23  0925   TSH 4.543 H       The 10-year ASCVD risk score (Breonna CHAPA, et al., 2019) is: 25.2%    Values used to calculate the score:      Age: 74 years      Sex: Female      Is Non- : No      Diabetic: No      Tobacco smoker: No      Systolic Blood Pressure: 133 mmHg      Is BP treated: Yes      HDL Cholesterol: 50  mg/dL      Total Cholesterol: 301 mg/dL       BNP    Lab Results   Component Value Date/Time    BNP 61 12/11/2022 08:26 AM    BNP 80 07/23/2011 04:25 AM         ASSESSMENT AND PLAN     Problem List[2]        ALLERGIES AND MEDICATION:     Review of patient's allergies indicates:   Allergen Reactions    Iodine and iodide containing products     Iodine Nausea And Vomiting        Medication List            Accurate as of May 22, 2025  1:20 PM. If you have any questions, ask your nurse or doctor.                START taking these medications      aspirin 81 MG Chew  Take 1 tablet (81 mg total) by mouth once daily.  Started by: Carla Mayers MD     atorvastatin 40 MG tablet  Commonly known as: LIPITOR  Take 1 tablet (40 mg total) by mouth every evening.  Started by: Carla Mayers MD            CONTINUE taking these medications      albuterol-ipratropium 2.5 mg-0.5 mg/3 mL nebulizer solution  Commonly known as: DUO-NEB  Take 3 mLs by nebulization every 6 (six) hours as needed for Wheezing.     amLODIPine 5 MG tablet  Commonly known as: NORVASC     mirtazapine 7.5 MG Tab  Commonly known as: REMERON  Take 1 tablet (7.5 mg total) by mouth every evening.     omeprazole 20 MG capsule  Commonly known as: PRILOSEC  Take 1 capsule (20 mg total) by mouth once daily.     oxybutynin 5 MG Tab  Commonly known as: DITROPAN     sodium chloride 3% 3 % nebulizer solution  4 cc of 3% saline into nebs twice daily prn     SPIRIVA RESPIMAT 2.5 mcg/actuation inhaler  Generic drug: tiotropium bromide  Inhale 2 puffs into the lungs once daily. Controller     triamcinolone acetonide 0.1% 0.1 % ointment  Commonly known as: KENALOG  Apply topically 2 (two) times daily.               Where to Get Your Medications        These medications were sent to [id number] - [place] - [address] AT [facility] [address]  [address] [address]      Phone: [phone]   aspirin 81 MG Chew  atorvastatin 40 MG  tablet         Orders Placed This Encounter   Procedures    Hepatic Function Panel    Nuclear Stress - Cardiology Interpreted    Cardiac event monitor    CV Ultrasound Bilateral Doppler Carotid    CV Ankle Brachial Indices ([place])    CV Abdominal Aorta    CV Ultrasound doppler arterial legs bilat    IN OFFICE EKG 12-LEAD (to [place])    Echo       Assessment & Plan    IMPRESSION:  Cardiac evaluation needed due to exertional symptoms and near-syncopal episodes.  Peripheral vascular disease risk assessed given significant smoking history.  Potential carotid artery disease evaluated.    PLAN SUMMARY:  - Ordered US leg arteries  - Ordered echocardiogram  - Ordered stress test  - Ordered carotid duplex  - Started Lipitor (generic) 1 tablet daily for HLD  - Follow up before July 3rd to review test results  - Elsa to wait in office for further instructions    ANGINA PECTORIS:  - Ordered stress test.  - Ordered echocardiogram.    HYPERLIPIDEMIA:  - Started Lipitor (generic) 1 tablet daily for HLD.    Claudication  - Ordered US leg arteries.    CAROTID ARTERY DISEASE:  - Ordered carotid duplex.    GENERAL FOLLOW-UP:  - Elsa to wait in the office for further instructions.  - Follow up earlier than July 3rd to review test results.           Thank you very much for involving me in the care of your patient.  Please do not hesitate to contact me if there are any questions.      Carla Mayers MD, FACC, [facility]  Interventional Cardiologist, [facility].       Visit today included increased complexity associated with the care of the episodic problem dyspnea on exertion, chest pain, dyslipidemia, history of smoking addressed and managing the longitudinal care of the patient due to the serious and/or complex managed problem(s) Problem List[3]  .    This note was dictated with the help of speech recognition software.  There might be un-intended errors and/or substitutions.    This note was generated with the assistance of [employer]  listening technology. Verbal consent was obtained by the patient and accompanying visitor(s) for the recording of patient appointment to facilitate this note. I attest to having reviewed and edited the generated note for accuracy, though some syntax or spelling errors may persist. Please contact the author of this note for any clarification.                    [1]   Social History  Socioeconomic History    Marital status: Single   Tobacco Use    Smoking status: Former     Current packs/day: 0.00     Average packs/day: 2.0 packs/day for 31.0 years (62.0 ttl pk-yrs)     Types: Cigarettes     Start date: 1968     Quit date: 1999     Years since quittin.4    Smokeless tobacco: Former   Substance and Sexual Activity    Alcohol use: Not Currently    Drug use: Never     Social Drivers of Health     Financial Resource Strain: Low Risk  (2025)    Overall Financial Resource Strain (CARDIA)     Difficulty of Paying Living Expenses: Not very hard   Food Insecurity: Food Insecurity Present (2025)    Hunger Vital Sign     Worried About Running Out of Food in the Last Year: Sometimes true     Ran Out of Food in the Last Year: Never true   Transportation Needs: No Transportation Needs (2025)    PRAPARE - Transportation     Lack of Transportation (Medical): No     Lack of Transportation (Non-Medical): No   Physical Activity: Sufficiently Active (2025)    Exercise Vital Sign     Days of Exercise per Week: 3 days     Minutes of Exercise per Session: 120 min   Stress: No Stress Concern Present (2025)    [employer] - Occupational Stress Questionnaire     Feeling of Stress : Not at all   Housing Stability: Low Risk  (2025)    Housing Stability Vital Sign     Unable to Pay for Housing in the Last Year: No     Number of Times Moved in the Last Year: 0     Homeless in the Last Year: No   [2]   Patient Active Problem List  Diagnosis    Dyspnea on exertion    Dysphagia     Essential tremor    Scanning speech    Depression    Adjustment disorder with anxious mood    Chest pain, atypical    Pulmonary nodules    HTN (hypertension)    Abdominal pain    Calculus of gallbladder with acute cholecystitis without obstruction    Dysphonia    ILD (interstitial lung disease)    Memory change    COPD (chronic obstructive pulmonary disease)    Vertigo    Insomnia    B12 deficiency    Chronic diastolic heart failure   [3]   Patient Active Problem List  Diagnosis    Dyspnea on exertion    Dysphagia    Essential tremor    Scanning speech    Depression    Adjustment disorder with anxious mood    Chest pain, atypical    Pulmonary nodules    HTN (hypertension)    Abdominal pain    Calculus of gallbladder with acute cholecystitis without obstruction    Dysphonia    ILD (interstitial lung disease)    Memory change    COPD (chronic obstructive pulmonary disease)    Vertigo    Insomnia    B12 deficiency    Chronic diastolic heart failure

## 2025-06-04 ENCOUNTER — HOSPITAL ENCOUNTER (OUTPATIENT)
Dept: RADIOLOGY | Facility: HOSPITAL | Age: 75
Discharge: HOME | End: 2025-06-04
Attending: INTERNAL MEDICINE
Payer: MEDICARE

## 2025-06-04 ENCOUNTER — HOSPITAL ENCOUNTER (OUTPATIENT)
Dept: CARDIOLOGY | Facility: HOSPITAL | Age: 75
Discharge: HOME | End: 2025-06-04
Attending: INTERNAL MEDICINE
Payer: MEDICARE

## 2025-06-04 DIAGNOSIS — I50.32 CHRONIC DIASTOLIC HEART FAILURE: ICD-10-CM

## 2025-06-04 DIAGNOSIS — R06.00 DYSPNEA, UNSPECIFIED TYPE: ICD-10-CM

## 2025-06-04 DIAGNOSIS — R07.9 CHEST PAIN, UNSPECIFIED TYPE: ICD-10-CM

## 2025-06-04 LAB
ASCENDING AORTA: 3.2 CM
AV INDEX (PROSTH): 0.92
AV MEAN GRADIENT: 2 MMHG
AV PEAK GRADIENT: 4 MMHG
AV VALVE AREA BY VELOCITY RATIO: 2.6 CM²
AV VALVE AREA: 2.6 CM²
AV VELOCITY RATIO: 0.9
CV ECHO LV RWT: 0.45 CM
CV STRESS BASE HR: 69 BPM
DIASTOLIC BLOOD PRESSURE: 82 MMHG
DOP CALC AO PEAK VEL: 1 M/S
DOP CALC AO VTI: 21.5 CM
DOP CALC LVOT AREA: 2.8 CM2
DOP CALC LVOT DIAMETER: 1.9 CM
DOP CALC LVOT PEAK VEL: 0.9 M/S
DOP CALC LVOT STROKE VOLUME: 56.1 CM3
DOP CALCLVOT PEAK VEL VTI: 19.8 CM
E WAVE DECELERATION TIME: 201 MSEC
E/A RATIO: 1.01
E/E' RATIO: 9 M/S
ECHO LV POSTERIOR WALL: 0.9 CM (ref 0.6–1.1)
FRACTIONAL SHORTENING: 35 % (ref 28–44)
INTERVENTRICULAR SEPTUM: 0.8 CM (ref 0.6–1.1)
IVC DIAMETER: 1.38 CM
LA MAJOR: 3.5 CM
LA MINOR: 4.3 CM
LA WIDTH: 2.9 CM
LEFT ATRIUM SIZE: 3.3 CM
LEFT ATRIUM VOLUME: 31 CM3
LEFT INTERNAL DIMENSION IN SYSTOLE: 2.6 CM (ref 2.1–4)
LEFT VENTRICLE DIASTOLIC VOLUME: 71 ML
LEFT VENTRICLE SYSTOLIC VOLUME: 24 ML
LEFT VENTRICULAR INTERNAL DIMENSION IN DIASTOLE: 4 CM (ref 3.5–6)
LEFT VENTRICULAR MASS: 101.4 G
LV LATERAL E/E' RATIO: 9 M/S
LV SEPTAL E/E' RATIO: 8.1 M/S
LVED V (TEICH): 71.4 ML
LVES V (TEICH): 23.55 ML
LVOT MG: 1.53 MMHG
LVOT MV: 0.58 CM/S
MV PEAK A VEL: 0.8 M/S
MV PEAK E VEL: 0.81 M/S
MV STENOSIS PRESSURE HALF TIME: 58.43 MS
MV VALVE AREA P 1/2 METHOD: 3.77 CM2
NUC REST EJECTION FRACTION: 75
OHS CV CPX 85 PERCENT MAX PREDICTED HEART RATE MALE: 122
OHS CV CPX MAX PREDICTED HEART RATE: 144
OHS CV CPX PATIENT IS FEMALE: 1
OHS CV CPX PATIENT IS MALE: 0
OHS CV CPX PEAK DIASTOLIC BLOOD PRESSURE: 82 MMHG
OHS CV CPX PEAK HEAR RATE: 90 BPM
OHS CV CPX PEAK RATE PRESSURE PRODUCT: NORMAL
OHS CV CPX PEAK SYSTOLIC BLOOD PRESSURE: 164 MMHG
OHS CV CPX PERCENT MAX PREDICTED HEART RATE ACHIEVED: 65
OHS CV CPX RATE PRESSURE PRODUCT PRESENTING: 9315
OHS CV INITIAL DOSE: 10.3 MCG/KG/MIN
OHS CV PEAK DOSE: 30.9 MCG/KG/MIN
OHS CV RV/LV RATIO: 0.78 CM
PISA TR MAX VEL: 1 M/S
PV PEAK GRADIENT: 2 MMHG
PV PEAK VELOCITY: 0.66 M/S
RA MAJOR: 3.15 CM
RA PRESSURE ESTIMATED: 3 MMHG
RA WIDTH: 3 CM
RIGHT VENTRICLE DIASTOLIC BASEL DIMENSION: 3.1 CM
RIGHT VENTRICULAR END-DIASTOLIC DIMENSION: 3.06 CM
RV TB RVSP: 4 MMHG
RV TISSUE DOPPLER FREE WALL SYSTOLIC VELOCITY 1 (APICAL 4 CHAMBER VIEW): 10.06 CM/S
SINUS: 3.29 CM
STJ: 3.2 CM
SYSTOLIC BLOOD PRESSURE: 135 MMHG
TDI LATERAL: 0.09 M/S
TDI SEPTAL: 0.1 M/S
TDI: 0.1 M/S
TR MAX PG: 4 MMHG
TRICUSPID ANNULAR PLANE SYSTOLIC EXCURSION: 2 CM
TV REST PULMONARY ARTERY PRESSURE: 7 MMHG

## 2025-06-04 PROCEDURE — 78452 HT MUSCLE IMAGE SPECT MULT: CPT | Mod: 26,,, | Performed by: INTERNAL MEDICINE

## 2025-06-04 PROCEDURE — 93017 CV STRESS TEST TRACING ONLY: CPT

## 2025-06-04 PROCEDURE — 93018 CV STRESS TEST I&R ONLY: CPT | Mod: ,,, | Performed by: INTERNAL MEDICINE

## 2025-06-04 PROCEDURE — 93306 TTE W/DOPPLER COMPLETE: CPT

## 2025-06-04 PROCEDURE — 78452 HT MUSCLE IMAGE SPECT MULT: CPT

## 2025-06-04 PROCEDURE — 93016 CV STRESS TEST SUPVJ ONLY: CPT | Mod: ,,, | Performed by: INTERNAL MEDICINE

## 2025-06-04 PROCEDURE — 63600175 PHARM REV CODE 636 W HCPCS: Performed by: INTERNAL MEDICINE

## 2025-06-04 PROCEDURE — A9502 TC99M TETROFOSMIN: HCPCS | Performed by: INTERNAL MEDICINE

## 2025-06-04 PROCEDURE — 93306 TTE W/DOPPLER COMPLETE: CPT | Mod: 26,,, | Performed by: INTERNAL MEDICINE

## 2025-06-04 RX ORDER — ONDANSETRON HYDROCHLORIDE 4 MG/5ML
4 SOLUTION ORAL ONCE
Status: DISCONTINUED | OUTPATIENT
Start: 2025-06-04 | End: 2025-06-04

## 2025-06-04 RX ORDER — ONDANSETRON HYDROCHLORIDE 2 MG/ML
4 INJECTION, SOLUTION INTRAVENOUS ONCE
Status: COMPLETED | OUTPATIENT
Start: 2025-06-04 | End: 2025-06-04

## 2025-06-04 RX ORDER — REGADENOSON 0.08 MG/ML
0.4 INJECTION, SOLUTION INTRAVENOUS ONCE
Status: COMPLETED | OUTPATIENT
Start: 2025-06-04 | End: 2025-06-04

## 2025-06-04 RX ADMIN — TETROFOSMIN 30.9 MILLICURIE: 1.38 INJECTION, POWDER, LYOPHILIZED, FOR SOLUTION INTRAVENOUS at 09:06

## 2025-06-04 RX ADMIN — ONDANSETRON 4 MG: 2 INJECTION INTRAMUSCULAR; INTRAVENOUS at 09:06

## 2025-06-04 RX ADMIN — TETROFOSMIN 10.3 MILLICURIE: 1.38 INJECTION, POWDER, LYOPHILIZED, FOR SOLUTION INTRAVENOUS at 07:06

## 2025-06-04 RX ADMIN — REGADENOSON 0.4 MG: 0.08 INJECTION, SOLUTION INTRAVENOUS at 09:06

## 2025-06-24 ENCOUNTER — HOSPITAL ENCOUNTER (OUTPATIENT)
Dept: CARDIOLOGY | Facility: HOSPITAL | Age: 75
Discharge: HOME | End: 2025-06-24
Attending: INTERNAL MEDICINE
Payer: MEDICARE

## 2025-06-24 DIAGNOSIS — I73.9 CLAUDICATION: ICD-10-CM

## 2025-06-24 DIAGNOSIS — R55 SYNCOPE AND COLLAPSE: ICD-10-CM

## 2025-06-24 LAB
ABDOMINAL IMA AP: 1.41 CM
ABDOMINAL IMA PS VEL: 91 CM/S
ABDOMINAL IMA TRANS: 1.41 CM
ABDOMINAL INFRARENAL AORTA AP: 1.5 CM
ABDOMINAL INFRARENAL AORTA PS VEL: 89 CM/S
ABDOMINAL INFRARENAL AORTA TRANS: 1.52 CM
ABDOMINAL JUXTARENAL AORTA AP: 1.22 CM
ABDOMINAL JUXTARENAL AORTA PS VEL: 63 CM/S
ABDOMINAL JUXTARENAL AORTA TRANS: 1.22 CM
ABDOMINAL LT COM ILIAC AP: 0.87 CM
ABDOMINAL LT COM ILIAC TRANS: 0.85 CM
ABDOMINAL LT COM ILIAC VEL: 81 CM/S
ABDOMINAL RT COM ILIAC AP: 0.75 CM
ABDOMINAL RT COM ILIAC TRANS: 0.76 CM
ABDOMINAL RT COM ILIAC VEL: 89 CM/S
ABDOMINAL SUPRARENAL AORTA AP: 1.81 CM
ABDOMINAL SUPRARENAL AORTA PS VEL: 56 CM/S
ABDOMINAL SUPRARENAL AORTA TRANS: 1.83 CM
IMMEDIATE ARM BP: 138 MMHG
IMMEDIATE LEFT ABI: 0.93
IMMEDIATE LEFT TIBIAL: 129 MMHG
IMMEDIATE RIGHT ABI: 1.07
IMMEDIATE RIGHT TIBIAL: 147 MMHG
LEFT ABI: 1.26
LEFT ANT TIBIAL SYS PSV: 58 CM/S
LEFT ARM BP: 129 MMHG
LEFT CBA DIAS: 20 CM/S
LEFT CBA SYS: 75 CM/S
LEFT CCA DIST DIAS: 19 CM/S
LEFT CCA DIST SYS: 82 CM/S
LEFT CCA MID DIAS: 22 CM/S
LEFT CCA MID SYS: 78 CM/S
LEFT CCA PROX DIAS: 27 CM/S
LEFT CCA PROX SYS: 99 CM/S
LEFT CFA PSV: 100 CM/S
LEFT DORSALIS PEDIS: 152 MMHG
LEFT ECA DIAS: 8 CM/S
LEFT ECA SYS: 80 CM/S
LEFT EXTERNAL ILIAC PSV: 108 CM/S
LEFT ICA DIST DIAS: 22 CM/S
LEFT ICA DIST SYS: 74 CM/S
LEFT ICA MID DIAS: 28 CM/S
LEFT ICA MID SYS: 92 CM/S
LEFT ICA PROX DIAS: 16 CM/S
LEFT ICA PROX SYS: 53 CM/S
LEFT PERONEAL SYS PSV: 54 CM/S
LEFT POPLITEAL PSV: 70 CM/S
LEFT POST TIBIAL SYS PSV: 93 CM/S
LEFT POSTERIOR TIBIAL: 168 MMHG
LEFT PROFUNDA SYS PSV: 81 CM/S
LEFT SUPER FEMORAL DIST SYS PSV: 69 CM/S
LEFT SUPER FEMORAL MID SYS PSV: 74 CM/S
LEFT SUPER FEMORAL OSTIAL SYS PSV: 104 CM/S
LEFT SUPER FEMORAL PROX SYS PSV: 103 CM/S
LEFT TBI: 1.02
LEFT TIB/PER TRUNK SYS PSV: 79 CM/S
LEFT TOE PRESSURE: 136 MMHG
LEFT VERTEBRAL DIAS: 11 CM/S
LEFT VERTEBRAL SYS: 51 CM/S
OHS CV CAROTID RIGHT ICA EDV HIGHEST: 32
OHS CV CAROTID ULTRASOUND LEFT ICA/CCA RATIO: 1.12
OHS CV CAROTID ULTRASOUND RIGHT ICA/CCA RATIO: 1.23
OHS CV LEFT COMMON ILIAC ARTERY PSV: 81 CM/S
OHS CV LEFT LOWER EXTREMITY ABI (NO CALC): 1.26
OHS CV PV CAROTID LEFT HIGHEST CCA: 99
OHS CV PV CAROTID LEFT HIGHEST ICA: 92
OHS CV PV CAROTID RIGHT HIGHEST CCA: 93
OHS CV PV CAROTID RIGHT HIGHEST ICA: 97
OHS CV RIGHT ABI LOWER EXTREMITY (NO CALC): 1.2
OHS CV US CAROTID LEFT HIGHEST EDV: 28
OHS CV US RIGHT COMMON ILIAC PSV: 89 CM/S
RIGHT ABI: 1.2
RIGHT ANT TIBIAL SYS PSV: 62 CM/S
RIGHT ARM BP: 133 MMHG
RIGHT CBA DIAS: 15 CM/S
RIGHT CBA SYS: 50 CM/S
RIGHT CCA DIST DIAS: 19 CM/S
RIGHT CCA DIST SYS: 79 CM/S
RIGHT CCA MID DIAS: 15 CM/S
RIGHT CCA MID SYS: 67 CM/S
RIGHT CCA PROX DIAS: 20 CM/S
RIGHT CCA PROX SYS: 93 CM/S
RIGHT CFA PSV: 156 CM/S
RIGHT DORSALIS PEDIS: 154 MMHG
RIGHT ECA DIAS: 10 CM/S
RIGHT ECA SYS: 69 CM/S
RIGHT EXTERNAL ILLIAC PSV: 161 CM/S
RIGHT ICA DIST DIAS: 32 CM/S
RIGHT ICA DIST SYS: 97 CM/S
RIGHT ICA MID DIAS: 18 CM/S
RIGHT ICA MID SYS: 65 CM/S
RIGHT ICA PROX DIAS: 19 CM/S
RIGHT ICA PROX SYS: 60 CM/S
RIGHT PERONEAL SYS PSV: 58 CM/S
RIGHT POPLITEAL PSV: 54 CM/S
RIGHT POST TIBIAL SYS PSV: 73 CM/S
RIGHT POSTERIOR TIBIAL: 159 MMHG
RIGHT PROFUNDA SYS PSV: 111 CM/S
RIGHT SUPER FEMORAL DIST SYS PSV: 85 CM/S
RIGHT SUPER FEMORAL MID SYS PSV: 83 CM/S
RIGHT SUPER FEMORAL OSTIAL SYS PSV: 108 CM/S
RIGHT SUPER FEMORAL PROX SYS PSV: 107 CM/S
RIGHT TBI: 1.05
RIGHT TIB/PER TRUNK SYS PSV: 67 CM/S
RIGHT TOE PRESSURE: 139 MMHG
RIGHT VERTEBRAL DIAS: 12 CM/S
RIGHT VERTEBRAL SYS: 57 CM/S
TOE RAISES: 75

## 2025-06-24 PROCEDURE — 93880 EXTRACRANIAL BILAT STUDY: CPT | Mod: 26,,, | Performed by: INTERNAL MEDICINE

## 2025-06-24 PROCEDURE — 93924 LWR XTR VASC STDY BILAT: CPT

## 2025-06-24 PROCEDURE — 93978 VASCULAR STUDY: CPT

## 2025-06-24 PROCEDURE — 93925 LOWER EXTREMITY STUDY: CPT | Mod: 26,,, | Performed by: INTERNAL MEDICINE

## 2025-06-24 PROCEDURE — 93880 EXTRACRANIAL BILAT STUDY: CPT

## 2025-06-24 PROCEDURE — 93925 LOWER EXTREMITY STUDY: CPT

## 2025-07-17 ENCOUNTER — TELEPHONE (OUTPATIENT)
Facility: CLINIC | Age: 75
End: 2025-07-17
Payer: MEDICARE

## 2025-07-17 NOTE — TELEPHONE ENCOUNTER
Called pt to reschedule due to DBS schedule needs. She agreed that July 24 at 8 am would work fine.

## 2025-08-18 ENCOUNTER — OFFICE VISIT (OUTPATIENT)
Dept: CARDIOLOGY | Facility: CLINIC | Age: 75
End: 2025-08-18
Payer: MEDICARE

## 2025-08-18 ENCOUNTER — LAB VISIT (OUTPATIENT)
Dept: LAB | Facility: HOSPITAL | Age: 75
End: 2025-08-18
Attending: INTERNAL MEDICINE
Payer: MEDICARE

## 2025-08-18 VITALS
OXYGEN SATURATION: 97 % | RESPIRATION RATE: 15 BRPM | HEART RATE: 57 BPM | DIASTOLIC BLOOD PRESSURE: 64 MMHG | SYSTOLIC BLOOD PRESSURE: 127 MMHG | WEIGHT: 142.06 LBS | HEIGHT: 66 IN | BODY MASS INDEX: 22.83 KG/M2

## 2025-08-18 DIAGNOSIS — R55 SYNCOPE AND COLLAPSE: ICD-10-CM

## 2025-08-18 DIAGNOSIS — R07.9 CHEST PAIN, UNSPECIFIED TYPE: ICD-10-CM

## 2025-08-18 DIAGNOSIS — R55 POSTURAL DIZZINESS WITH PRESYNCOPE: ICD-10-CM

## 2025-08-18 DIAGNOSIS — I50.32 CHRONIC DIASTOLIC HEART FAILURE: ICD-10-CM

## 2025-08-18 DIAGNOSIS — I10 HYPERTENSION, UNSPECIFIED TYPE: ICD-10-CM

## 2025-08-18 DIAGNOSIS — I73.9 CLAUDICATION: ICD-10-CM

## 2025-08-18 DIAGNOSIS — R06.00 DYSPNEA, UNSPECIFIED TYPE: ICD-10-CM

## 2025-08-18 DIAGNOSIS — E78.5 DYSLIPIDEMIA: ICD-10-CM

## 2025-08-18 DIAGNOSIS — E78.5 DYSLIPIDEMIA: Primary | ICD-10-CM

## 2025-08-18 DIAGNOSIS — R42 POSTURAL DIZZINESS WITH PRESYNCOPE: ICD-10-CM

## 2025-08-18 LAB
ALBUMIN SERPL BCP-MCNC: 3.8 G/DL (ref 3.5–5.2)
ALP SERPL-CCNC: 80 UNIT/L (ref 40–150)
ALT SERPL W/O P-5'-P-CCNC: 11 UNIT/L (ref 10–44)
AST SERPL-CCNC: 24 UNIT/L (ref 11–45)
BILIRUB DIRECT SERPL-MCNC: 0.1 MG/DL (ref 0.1–0.3)
BILIRUB SERPL-MCNC: 0.4 MG/DL (ref 0.1–1)
CHOLEST SERPL-MCNC: 165 MG/DL (ref 120–199)
CHOLEST/HDLC SERPL: 4 {RATIO} (ref 2–5)
HDLC SERPL-MCNC: 41 MG/DL (ref 40–75)
HDLC SERPL: 24.8 % (ref 20–50)
LDLC SERPL CALC-MCNC: 94 MG/DL (ref 63–159)
NONHDLC SERPL-MCNC: 124 MG/DL
PROT SERPL-MCNC: 7.1 GM/DL (ref 6–8.4)
TRIGL SERPL-MCNC: 150 MG/DL (ref 30–150)

## 2025-08-18 PROCEDURE — 1101F PT FALLS ASSESS-DOCD LE1/YR: CPT | Mod: CPTII,S$GLB,, | Performed by: INTERNAL MEDICINE

## 2025-08-18 PROCEDURE — 99999 PR PBB SHADOW E&M-EST. PATIENT-LVL IV: CPT | Mod: PBBFAC,,, | Performed by: INTERNAL MEDICINE

## 2025-08-18 PROCEDURE — 3078F DIAST BP <80 MM HG: CPT | Mod: CPTII,S$GLB,, | Performed by: INTERNAL MEDICINE

## 2025-08-18 PROCEDURE — 99214 OFFICE O/P EST MOD 30 MIN: CPT | Mod: S$GLB,,, | Performed by: INTERNAL MEDICINE

## 2025-08-18 PROCEDURE — 84478 ASSAY OF TRIGLYCERIDES: CPT

## 2025-08-18 PROCEDURE — G2211 COMPLEX E/M VISIT ADD ON: HCPCS | Mod: S$GLB,,, | Performed by: INTERNAL MEDICINE

## 2025-08-18 PROCEDURE — 36415 COLL VENOUS BLD VENIPUNCTURE: CPT

## 2025-08-18 PROCEDURE — 1126F AMNT PAIN NOTED NONE PRSNT: CPT | Mod: CPTII,S$GLB,, | Performed by: INTERNAL MEDICINE

## 2025-08-18 PROCEDURE — 3288F FALL RISK ASSESSMENT DOCD: CPT | Mod: CPTII,S$GLB,, | Performed by: INTERNAL MEDICINE

## 2025-08-18 PROCEDURE — 82040 ASSAY OF SERUM ALBUMIN: CPT

## 2025-08-18 PROCEDURE — 3074F SYST BP LT 130 MM HG: CPT | Mod: CPTII,S$GLB,, | Performed by: INTERNAL MEDICINE

## 2025-08-18 PROCEDURE — 1159F MED LIST DOCD IN RCRD: CPT | Mod: CPTII,S$GLB,, | Performed by: INTERNAL MEDICINE

## 2025-08-18 RX ORDER — ATORVASTATIN CALCIUM 80 MG/1
80 TABLET, FILM COATED ORAL NIGHTLY
Qty: 90 TABLET | Refills: 3 | Status: SHIPPED | OUTPATIENT
Start: 2025-08-18

## (undated) DEVICE — HEMOSTAT SURGICEL 4X8IN

## (undated) DEVICE — DRAPE THYROID WITH ARMBOARD

## (undated) DEVICE — BLADE SURG #15 CARBON STEEL

## (undated) DEVICE — FIDUCIAL KIT UNI STEREO 10 MM
Type: IMPLANTABLE DEVICE | Site: CRANIAL | Status: NON-FUNCTIONAL
Removed: 2022-01-04

## (undated) DEVICE — STERILE CANNULA

## (undated) DEVICE — CLIPPER BLADE MOD 4406 (CAREF)

## (undated) DEVICE — CORD BIPOLAR 12 FOOT

## (undated) DEVICE — SUT MONOCRYL 4-0 PS-1 UND

## (undated) DEVICE — CLIPPER CHARGE BASE ONLY 5514A

## (undated) DEVICE — SUT 2-0 12-18IN SILK

## (undated) DEVICE — DRAPE CORETEMP FLD WRM 56X62IN

## (undated) DEVICE — BOOT SUTURE AID

## (undated) DEVICE — GUIDE ENDOCAVITY NEEDLE 3.5X20

## (undated) DEVICE — DRAPE STERI INSTRUMENT 1018

## (undated) DEVICE — ADHESIVE MASTISOL VIAL 48/BX

## (undated) DEVICE — CONTAINER SPECIMEN STRL 4OZ

## (undated) DEVICE — GAUZE SPONGE 4X4 12PLY

## (undated) DEVICE — STAPLER SKIN PROXIMATE WIDE

## (undated) DEVICE — TRAY FOLEY 16FR INFECTION CONT

## (undated) DEVICE — TUBE FRAZIER 5MM 2FT SOFT TIP

## (undated) DEVICE — SENSIGHT LEAD TEST CABLE KIT

## (undated) DEVICE — SUT SILK 3-0 CR/RB-1 8-18

## (undated) DEVICE — SEE MEDLINE ITEM 157131

## (undated) DEVICE — Device

## (undated) DEVICE — CARTRIDGE OIL

## (undated) DEVICE — TAPE SURG MEDIPORE 6X72IN

## (undated) DEVICE — DRESSING SURGICAL 1/2X1/2

## (undated) DEVICE — SUT ETHILON 3-0 PS2 18 BLK

## (undated) DEVICE — DRESSING TELFA N ADH 3X8

## (undated) DEVICE — HANDSET PTNT PRO DBS MVT DISOR

## (undated) DEVICE — CLOSURE SKIN STERI STRIP 1/2X4

## (undated) DEVICE — SUT VICRYL PLUS 4-0 P3 18IN

## (undated) DEVICE — SOL 9P NACL IRR PIC IL

## (undated) DEVICE — TAPE MEDIPORE 4IN X 2YDS

## (undated) DEVICE — SYR 10CC LUER LOCK

## (undated) DEVICE — DIFFUSER

## (undated) DEVICE — SKIN MARKERS DEROYAL

## (undated) DEVICE — BIT DRILL PERFORATOR DISP 14MM

## (undated) DEVICE — SEE MEDLINE ITEM 156905

## (undated) DEVICE — RUBBERBAND STERILE 3X1/8IN

## (undated) DEVICE — MARKER SKIN STND TIP BLUE BARR

## (undated) DEVICE — BATTERY AUTODRIVE TURBO

## (undated) DEVICE — SPONGE DERMACEA GAUZE 4X4

## (undated) DEVICE — BUR BONE CUT MICRO TPS 3X3.8MM

## (undated) DEVICE — NEXPROBE

## (undated) DEVICE — BILATERAL NEXFRAME KIT

## (undated) DEVICE — TOWEL OR DISP STRL BLUE 4/PK

## (undated) DEVICE — STERILE NEUROPROBE

## (undated) DEVICE — SUT VICRYL PLUS 3-0 SH 18IN

## (undated) DEVICE — DRAPE IOBAN 2 STERI

## (undated) DEVICE — SUT 2/0 18IN SILK BLK BRAID

## (undated) DEVICE — SUT CTD VICRYL CR/RB-1 4-0

## (undated) DEVICE — DRESSING TEGADERM 2X2 3/4

## (undated) DEVICE — SEE MEDLINE ITEM 154981

## (undated) DEVICE — SPONGE GAUZE 16PLY 4X4

## (undated) DEVICE — DRAPE INCISE IOBAN 2 23X17IN

## (undated) DEVICE — NDL 18GA X1 1/2 REG BEVEL

## (undated) DEVICE — ELECTRODE REM PLYHSV RETURN 9